# Patient Record
Sex: MALE | Race: WHITE | Employment: UNEMPLOYED | URBAN - METROPOLITAN AREA
[De-identification: names, ages, dates, MRNs, and addresses within clinical notes are randomized per-mention and may not be internally consistent; named-entity substitution may affect disease eponyms.]

---

## 2019-08-09 ENCOUNTER — OFFICE VISIT (OUTPATIENT)
Dept: ORTHOPEDIC SURGERY | Age: 59
End: 2019-08-09
Payer: COMMERCIAL

## 2019-08-09 VITALS — BODY MASS INDEX: 25.84 KG/M2 | HEIGHT: 73 IN | WEIGHT: 195 LBS

## 2019-08-09 DIAGNOSIS — M19.012 PRIMARY OSTEOARTHRITIS OF LEFT SHOULDER: Primary | ICD-10-CM

## 2019-08-09 DIAGNOSIS — M25.512 LEFT SHOULDER PAIN, UNSPECIFIED CHRONICITY: ICD-10-CM

## 2019-08-09 PROCEDURE — 99243 OFF/OP CNSLTJ NEW/EST LOW 30: CPT | Performed by: ORTHOPAEDIC SURGERY

## 2019-08-09 PROCEDURE — L3670 SO ACRO/CLAV CAN WEB PRE OTS: HCPCS | Performed by: ORTHOPAEDIC SURGERY

## 2019-08-09 NOTE — PROGRESS NOTES
malnutrition  Mental Status: The patient is oriented to time, place and person. The patient's mood and affect are appropriate. Vascular: Examination reveals no swelling or calf tenderness. Peripheral pulses are palpable and 2+. Neurological: The patient has good coordination. There is no weakness or sensory deficit. Shoulder Examination:    Inspection: On careful inspection he has no focal atrophy about the shoulder girdle. Palpation: He has both tenderness and crepitation along the anterior and posterior joint lines. Range of Motion: He has reasonably well-preserved arc of movement despite his terrible x-rays. His forward flexion is 140 degrees, abduction 120 degrees, external rotation 50 degrees. Internal rotation to L3. Strength: Is good isometric strength at least 4+/5 without focal lag signs are giving way    Special Tests: He has significant pain with any type of dynamic active compression test.  He has a very prominent acromioclavicular joint with mild tenderness. Skin: There are no rashes, ulcerations or lesions. Gait: Stable with no assist device    Spine full cervical rotation    Additional Comments:         Radiology:     Plain x-rays obtained and reviewed in the office today include 4 views of the left shoulder. These demonstrate very severe grade 4 concentric osteoarthritis. He has very mild early posterior glenoid erosion. Flattening of the humeral head with sclerosis. Subchondral cysts and moderately sized inferior humeral head osteophyte. Assessment : Severe left shoulder Javier humeral osteoarthritis      Office Procedures:  Orders Placed This Encounter   Procedures    XR SHOULDER LEFT (MIN 2 VIEWS)     Standing Status:   Future     Number of Occurrences:   1     Standing Expiration Date:   8/9/2020    CT Shoulder Left WO Contrast     Scheduling Instructions:      Please scan under [address] protocol.       [facility]

## 2019-08-12 ENCOUNTER — OFFICE VISIT (OUTPATIENT)
Dept: ORTHOPEDIC SURGERY | Age: 59
End: 2019-08-12
Payer: COMMERCIAL

## 2019-08-12 DIAGNOSIS — M25.522 LEFT ELBOW PAIN: ICD-10-CM

## 2019-08-12 DIAGNOSIS — M25.521 RIGHT ELBOW PAIN: Primary | ICD-10-CM

## 2019-08-12 DIAGNOSIS — M77.11 RIGHT LATERAL EPICONDYLITIS: ICD-10-CM

## 2019-08-12 DIAGNOSIS — M77.12 LEFT LATERAL EPICONDYLITIS: ICD-10-CM

## 2019-08-12 PROCEDURE — 99213 OFFICE O/P EST LOW 20 MIN: CPT | Performed by: ORTHOPAEDIC SURGERY

## 2019-08-12 PROCEDURE — 1036F TOBACCO NON-USER: CPT | Performed by: ORTHOPAEDIC SURGERY

## 2019-08-12 PROCEDURE — G8427 DOCREV CUR MEDS BY ELIG CLIN: HCPCS | Performed by: ORTHOPAEDIC SURGERY

## 2019-08-12 PROCEDURE — 3017F COLORECTAL CA SCREEN DOC REV: CPT | Performed by: ORTHOPAEDIC SURGERY

## 2019-08-12 PROCEDURE — G8419 CALC BMI OUT NRM PARAM NOF/U: HCPCS | Performed by: ORTHOPAEDIC SURGERY

## 2019-08-19 ENCOUNTER — HOSPITAL ENCOUNTER (OUTPATIENT)
Dept: OCCUPATIONAL THERAPY | Age: 59
Setting detail: THERAPIES SERIES
Discharge: HOME | End: 2019-08-19
Payer: COMMERCIAL

## 2019-08-19 PROCEDURE — 97110 THERAPEUTIC EXERCISES: CPT | Performed by: OCCUPATIONAL THERAPIST

## 2019-08-19 PROCEDURE — 97140 MANUAL THERAPY 1/> REGIONS: CPT | Performed by: OCCUPATIONAL THERAPIST

## 2019-08-19 PROCEDURE — 97165 OT EVAL LOW COMPLEX 30 MIN: CPT | Performed by: OCCUPATIONAL THERAPIST

## 2019-08-19 NOTE — PLAN OF CARE
Dressing    [] Self Feeding   [] Grooming    [] Work/Education   [] Functional Mobility  [] Sleeping/Rest   [] Toileting/Hygiene   [x] Recreational Activities   [] Driving    [] Community/Social Participation   [] Other:     Rehab Potential:   [] Excellent [x] Good [] Fair  [] Poor     Barriers affecting rehab potential:  []Age    []Lack of Motivation   []Co-Morbidities  []Cognitive Function  []Environmental/home/work barriers  []Other: Tolerance of evaluation/treatment:    [] Excellent [] Good [] Fair  [] Poor    PLAN OF CARE:  Interventions:   [x] Therapeutic Exercise [x] Therapeutic Activity    [x] Activities of Daily Living [x] Neuromuscular Re-education      [x] Patient Education  [x] Manual Therapy      [x] Modalities as needed, and not otherwise contraindicated, including: ultrasound,paraffin,moist heat/cold pack, electrical stimulation, contrast bath, iontophoresis  [] Splinting    Frequency/Duration:  1-2 days per week for 6 weeks      GOALS:  Short Term Goals: To be achieved in: 2 weeks  1. Independent in HEP and progression per patient tolerance, in order to prevent re-injury. 2. Patient will have a decrease in pain to facilitate improvement in movement, function, and ADLs as indicated by Functional Deficits. Long Term Goals to be achieved in 6  weeks, including patient directed goals to address identified performance deficits:  1) Pt to be independent in graded HEP progression with a good level of effort and compliance. 2) Pt to report a score of 20 % or less on the Quick DASH disability questionnaire for increased performance with carrying, moving, and handling objects. 3) Pt will demonstrate increased ROM to bilateral elbows pain free with no resistance for improved ability to perform facial hygiene without pain  4) Pt will demonstrate increased strength to left  to 70 lbs. With 2/10 pain for improved ability to lift household objects.   5) Pt will have a decrease in pain to 2/10 to facilitate improvement in ability to do normal recreational activities with less pain. 6)    7)      OCCUPATIONAL THERAPY EVALUATION COMPLEXITY JUSTIFICATION:    [x] An occupational profile and medical/therapy history, which includes:   [x] a brief history including medical and/or therapy records relating to the     presenting problem   [] an expanded review of medical and/or therapy records and additional review     of physical, cognitive or psychosocial history related to current functional    performance   [] an extensive additional review of review of medical and/or therapy records and physical, cognitive, or psychosocial history related to current    functional performance    [x] An assessment that identifies performance deficits (relating to physical, cognitive, or psychosocial skills) that result in activity limitations and/or participation restrictions:   [x] 1-3 performance deficits   [] 3-5 performance deficits   [] 5 or more performance deficits    [x] Clinical decision making of:   [x] low complexity, including analysis of occupational profile, data analysis from problem focused assessment, and consideration of a limited number of treatment options. No comorbidities affect occupational performance. No task modifications or assistance needed to complete evaluation. [] moderate complexity, including analysis of occupational profile, data analysis from detailed assessment and consideration of several treatment options. Comorbidities that affect occupational performance may be present. Minimal to moderate task modifications or assistance needed to complete assessment. [] high complexity, including analysis of occupational profile, analysis of data from comprehensive assessment and consideration of multiple treatment options. Multiple comorbidities present that affect occupational performance. Significant task modifications or assistance needed to complete assessment.     Evaluation Code:  [x] Low

## 2019-08-26 ENCOUNTER — HOSPITAL ENCOUNTER (OUTPATIENT)
Dept: OCCUPATIONAL THERAPY | Age: 59
Setting detail: THERAPIES SERIES
Discharge: HOME | End: 2019-08-26
Payer: COMMERCIAL

## 2019-08-26 PROCEDURE — 97110 THERAPEUTIC EXERCISES: CPT | Performed by: OCCUPATIONAL THERAPIST

## 2019-08-26 PROCEDURE — 97140 MANUAL THERAPY 1/> REGIONS: CPT | Performed by: OCCUPATIONAL THERAPIST

## 2019-08-26 NOTE — FLOWSHEET NOTE
left  to 70 lbs. With 2/10 pain for improved ability to lift household objects. Not met but improved, at 65#  5) Pt will have a decrease in pain to 2/10 to facilitate improvement in ability to do normal recreational activities with less pain. MET    Progression Towards Functional goals:  [x] Patient is progressing as expected towards functional goals listed. [] Progression is slowed due to complexities listed. [] Progression has been slowed due to co-morbidities. [] Plan just implemented, too soon to assess goals progression  [] All goals are met  [x] Other:  Met goals 3 and 5.  2 was not retested. 4 is improving    ASSESSMENT:  Patient feels he has a good understanding of program to prevent exacerbation of symptoms and allow healing to occur with time. No further follow-up desired. Treatment/Activity Tolerance:  [x] Patient tolerated treatment well [] Patient limited by fatique  [] Patient limited by pain  [] Patient limited by other medical complications  [] Other:     Prognosis: [x] Good [] Fair  [] Poor    Patient Requires Follow-up: [] Yes  [x] No  - per patient request    PLAN: See eval  [] Continue per plan of care [] Alter current plan (see comments)  [] Plan of care initiated [] Hold pending MD visit [x] Discharge    If patient does not return for further follow ups after this date, please consider this as the patient's discharge from occupational therapy.     Electronically signed by: Jose Rider, [address] [place] [phone]

## 2019-09-11 ENCOUNTER — OFFICE VISIT (OUTPATIENT)
Dept: FAMILY MEDICINE CLINIC | Age: 59
End: 2019-09-11
Payer: COMMERCIAL

## 2019-09-11 VITALS
OXYGEN SATURATION: 98 % | TEMPERATURE: 98 F | HEART RATE: 82 BPM | SYSTOLIC BLOOD PRESSURE: 131 MMHG | RESPIRATION RATE: 16 BRPM | DIASTOLIC BLOOD PRESSURE: 81 MMHG | BODY MASS INDEX: 26.33 KG/M2 | HEIGHT: 73 IN | WEIGHT: 198.7 LBS

## 2019-09-11 DIAGNOSIS — Z01.818 PREOP EXAMINATION: Primary | ICD-10-CM

## 2019-09-11 DIAGNOSIS — Z12.11 COLON CANCER SCREENING: ICD-10-CM

## 2019-09-11 DIAGNOSIS — E78.2 MIXED HYPERLIPIDEMIA: ICD-10-CM

## 2019-09-11 DIAGNOSIS — Z12.5 SCREENING PSA (PROSTATE SPECIFIC ANTIGEN): ICD-10-CM

## 2019-09-11 DIAGNOSIS — I10 ESSENTIAL HYPERTENSION: ICD-10-CM

## 2019-09-11 DIAGNOSIS — Z86.79 HISTORY OF ATRIAL FIBRILLATION: ICD-10-CM

## 2019-09-11 DIAGNOSIS — M25.512 ACUTE PAIN OF LEFT SHOULDER: ICD-10-CM

## 2019-09-11 DIAGNOSIS — C81.90 HODGKIN LYMPHOMA, UNSPECIFIED HODGKIN LYMPHOMA TYPE, UNSPECIFIED BODY REGION (HCC): ICD-10-CM

## 2019-09-11 DIAGNOSIS — F33.0 MILD EPISODE OF RECURRENT MAJOR DEPRESSIVE DISORDER (HCC): ICD-10-CM

## 2019-09-11 PROCEDURE — 99244 OFF/OP CNSLTJ NEW/EST MOD 40: CPT | Performed by: FAMILY MEDICINE

## 2019-09-11 PROCEDURE — G8427 DOCREV CUR MEDS BY ELIG CLIN: HCPCS | Performed by: FAMILY MEDICINE

## 2019-09-11 PROCEDURE — G8419 CALC BMI OUT NRM PARAM NOF/U: HCPCS | Performed by: FAMILY MEDICINE

## 2019-09-11 RX ORDER — BUPROPION HYDROCHLORIDE 300 MG/1
300 TABLET ORAL DAILY
Refills: 2 | COMMUNITY
Start: 2019-06-04 | End: 2019-11-01 | Stop reason: SDUPTHER

## 2019-09-11 RX ORDER — SERTRALINE HYDROCHLORIDE 100 MG/1
100 TABLET, FILM COATED ORAL DAILY
Refills: 2 | COMMUNITY
Start: 2019-06-11 | End: 2019-11-01 | Stop reason: SDUPTHER

## 2019-09-11 RX ORDER — TRAZODONE HYDROCHLORIDE 50 MG/1
50 TABLET ORAL NIGHTLY
Refills: 2 | COMMUNITY
Start: 2019-06-11 | End: 2020-02-24

## 2019-09-11 ASSESSMENT — PATIENT HEALTH QUESTIONNAIRE - PHQ9
SUM OF ALL RESPONSES TO PHQ QUESTIONS 1-9: 0
1. LITTLE INTEREST OR PLEASURE IN DOING THINGS: 0
SUM OF ALL RESPONSES TO PHQ9 QUESTIONS 1 & 2: 0
2. FEELING DOWN, DEPRESSED OR HOPELESS: 0
SUM OF ALL RESPONSES TO PHQ QUESTIONS 1-9: 0

## 2019-09-11 ASSESSMENT — ENCOUNTER SYMPTOMS
RECTAL PAIN: 0
SINUS PRESSURE: 0
COLOR CHANGE: 0
COUGH: 0
FACIAL SWELLING: 0
EYE REDNESS: 0
ANAL BLEEDING: 0
BLOOD IN STOOL: 0
EYE ITCHING: 0
SHORTNESS OF BREATH: 0
RHINORRHEA: 0
SORE THROAT: 0
EYE PAIN: 0
TROUBLE SWALLOWING: 0
BACK PAIN: 0
CONSTIPATION: 0
STRIDOR: 0
VOMITING: 0
CHEST TIGHTNESS: 0
APNEA: 0
ABDOMINAL PAIN: 0
SINUS PAIN: 0
NAUSEA: 0
CHOKING: 0
PHOTOPHOBIA: 0
DIARRHEA: 0
WHEEZING: 0
ABDOMINAL DISTENTION: 0
VOICE CHANGE: 0
EYE DISCHARGE: 0

## 2019-09-11 NOTE — PROGRESS NOTES
appearing. Ok to proceed with procedure if stable labs/EKG/echo/clearance by cards. Comprehensive Metabolic Panel    CBC    EKG 12 Lead    Jessica Ohara MD, Cardiology, [facility]   2. Pain of left shoulder  Per ortho. 3. Essential hypertension  Stable. EKG 12 Lead    ECHO Complete 2D W Doppler W Color   4. Mild episode of recurrent major depressive disorder (HCC)  Stable. Per psych. 5. Screening PSA (prostate specific antigen)  Risks vs benefits of PSA screening reviewed:pt' wishes to proceed w/testing. Psa screening   6. Mixed hyperlipidemia  Labs due. cmp/lipids            7. Colon cancer screening  Ambulatory referral to Gastroenterology   8. History of atrial fibrillation  Stable. Establish with cards. Check tests. Controlled rate with betablocker. EKG 12 Lead    ECHO Complete 2D W Doppler Raciel Shabazz MD, Cardiology, [facility]   9. Hodgkin's dz:stage 4  S/p tx:per oncology:pt' to schedule f/u appt. Plan:         Pre-op letter/office note has been sent(faxed) to specialist requesting preop consultation. No NSAIDs 1week prior to procedure. Advised release of medical records from all prior physicians(including PCP/specialists)  Obtain labs/diagnostic tests as discussed today & call back for results within 2-7days. Pt' left office in good condition. Advised to go to local ER or call 911 for any worrisome signs/sx.           Devaughn Durand MD

## 2019-09-16 ENCOUNTER — HOSPITAL ENCOUNTER (OUTPATIENT)
Age: 59
Discharge: HOME | End: 2019-09-16
Payer: COMMERCIAL

## 2019-09-16 LAB
ABO/RH: NORMAL
ALBUMIN SERPL-MCNC: 4.8 G/DL (ref 3.4–5)
ANION GAP SERPL CALCULATED.3IONS-SCNC: 14 MMOL/L (ref 3–16)
ANTIBODY SCREEN: NORMAL
APTT: 33 SEC (ref 26–36)
BACTERIA: ABNORMAL /HPF
BASOPHILS ABSOLUTE: 0 K/UL (ref 0–0.2)
BASOPHILS RELATIVE PERCENT: 0.6 %
BILIRUBIN URINE: NEGATIVE
BLOOD, URINE: ABNORMAL
BUN BLDV-MCNC: 13 MG/DL (ref 7–20)
CALCIUM SERPL-MCNC: 9.6 MG/DL (ref 8.3–10.6)
CASTS: ABNORMAL /LPF
CHLORIDE BLD-SCNC: 104 MMOL/L (ref 99–110)
CLARITY: CLEAR
CO2: 26 MMOL/L (ref 21–32)
COLOR: YELLOW
CREAT SERPL-MCNC: 0.8 MG/DL (ref 0.9–1.3)
EKG ATRIAL RATE: 58 BPM
EKG DIAGNOSIS: NORMAL
EKG P AXIS: -8 DEGREES
EKG P-R INTERVAL: 156 MS
EKG Q-T INTERVAL: 406 MS
EKG QRS DURATION: 88 MS
EKG QTC CALCULATION (BAZETT): 398 MS
EKG R AXIS: 0 DEGREES
EKG T AXIS: 5 DEGREES
EKG VENTRICULAR RATE: 58 BPM
EOSINOPHILS ABSOLUTE: 0.3 K/UL (ref 0–0.6)
EOSINOPHILS RELATIVE PERCENT: 6.4 %
GFR AFRICAN AMERICAN: >60
GFR NON-AFRICAN AMERICAN: >60
GLUCOSE BLD-MCNC: 94 MG/DL (ref 70–99)
GLUCOSE URINE: NEGATIVE MG/DL
HCT VFR BLD CALC: 38.2 % (ref 40.5–52.5)
HEMOGLOBIN: 12.9 G/DL (ref 13.5–17.5)
INR BLD: 0.98 (ref 0.86–1.14)
KETONES, URINE: NEGATIVE MG/DL
LEUKOCYTE ESTERASE, URINE: NEGATIVE
LYMPHOCYTES ABSOLUTE: 1.1 K/UL (ref 1–5.1)
LYMPHOCYTES RELATIVE PERCENT: 26.8 %
MCH RBC QN AUTO: 31.3 PG (ref 26–34)
MCHC RBC AUTO-ENTMCNC: 33.6 G/DL (ref 31–36)
MCV RBC AUTO: 93.1 FL (ref 80–100)
MICROSCOPIC EXAMINATION: YES
MONOCYTES ABSOLUTE: 0.5 K/UL (ref 0–1.3)
MONOCYTES RELATIVE PERCENT: 12.2 %
NEUTROPHILS ABSOLUTE: 2.2 K/UL (ref 1.7–7.7)
NEUTROPHILS RELATIVE PERCENT: 54 %
NITRITE, URINE: NEGATIVE
PDW BLD-RTO: 13.1 % (ref 12.4–15.4)
PH UA: 6 (ref 5–8)
PLATELET # BLD: 301 K/UL (ref 135–450)
PMV BLD AUTO: 7.6 FL (ref 5–10.5)
POTASSIUM SERPL-SCNC: 4.8 MMOL/L (ref 3.5–5.1)
PROTEIN UA: ABNORMAL MG/DL
PROTHROMBIN TIME: 11.2 SEC (ref 9.8–13)
RBC # BLD: 4.1 M/UL (ref 4.2–5.9)
RBC UA: ABNORMAL /HPF (ref 0–2)
SODIUM BLD-SCNC: 144 MMOL/L (ref 136–145)
SPECIFIC GRAVITY UA: >=1.03 (ref 1–1.03)
TRANSFERRIN: 245 MG/DL (ref 200–360)
URINE TYPE: ABNORMAL
UROBILINOGEN, URINE: 0.2 E.U./DL
WBC # BLD: 4.1 K/UL (ref 4–11)
WBC UA: ABNORMAL /HPF (ref 0–5)

## 2019-09-16 PROCEDURE — 93010 ELECTROCARDIOGRAM REPORT: CPT | Performed by: INTERNAL MEDICINE

## 2019-09-16 PROCEDURE — 85730 THROMBOPLASTIN TIME PARTIAL: CPT

## 2019-09-16 PROCEDURE — 82040 ASSAY OF SERUM ALBUMIN: CPT

## 2019-09-16 PROCEDURE — 81001 URINALYSIS AUTO W/SCOPE: CPT

## 2019-09-16 PROCEDURE — 86901 BLOOD TYPING SEROLOGIC RH(D): CPT

## 2019-09-16 PROCEDURE — 93005 ELECTROCARDIOGRAM TRACING: CPT | Performed by: ORTHOPAEDIC SURGERY

## 2019-09-16 PROCEDURE — 86850 RBC ANTIBODY SCREEN: CPT

## 2019-09-16 PROCEDURE — 85025 COMPLETE CBC W/AUTO DIFF WBC: CPT

## 2019-09-16 PROCEDURE — 36415 COLL VENOUS BLD VENIPUNCTURE: CPT

## 2019-09-16 PROCEDURE — 85610 PROTHROMBIN TIME: CPT

## 2019-09-16 PROCEDURE — 86900 BLOOD TYPING SEROLOGIC ABO: CPT

## 2019-09-16 PROCEDURE — 84466 ASSAY OF TRANSFERRIN: CPT

## 2019-09-16 PROCEDURE — 80048 BASIC METABOLIC PNL TOTAL CA: CPT

## 2019-09-16 PROCEDURE — 83036 HEMOGLOBIN GLYCOSYLATED A1C: CPT

## 2019-09-16 PROCEDURE — 87086 URINE CULTURE/COLONY COUNT: CPT

## 2019-09-17 ENCOUNTER — TELEPHONE (OUTPATIENT)
Dept: ORTHOPEDIC SURGERY | Age: 59
End: 2019-09-17

## 2019-09-17 LAB
ESTIMATED AVERAGE GLUCOSE: 102.5 MG/DL
HBA1C MFR BLD: 5.2 %
URINE CULTURE, ROUTINE: NORMAL

## 2019-09-17 NOTE — TELEPHONE ENCOUNTER
[place] Nurse [id number]    Date: 10/3/19  Type of SX:  Inpatient  Location: [facility]  CPT: 10520   SX area: LT shoulder

## 2019-09-18 ENCOUNTER — TELEPHONE (OUTPATIENT)
Dept: ORTHOPEDIC SURGERY | Age: 59
End: 2019-09-18

## 2019-09-27 ENCOUNTER — HOSPITAL ENCOUNTER (OUTPATIENT)
Dept: NON INVASIVE DIAGNOSTICS | Age: 59
Discharge: HOME | End: 2019-09-27
Payer: COMMERCIAL

## 2019-09-27 DIAGNOSIS — I10 ESSENTIAL HYPERTENSION: ICD-10-CM

## 2019-09-27 DIAGNOSIS — R93.1 DECREASED CARDIAC EJECTION FRACTION: ICD-10-CM

## 2019-09-27 DIAGNOSIS — R93.1 ABNORMAL ECHOCARDIOGRAM: Primary | ICD-10-CM

## 2019-09-27 DIAGNOSIS — Z86.79 HISTORY OF ATRIAL FIBRILLATION: ICD-10-CM

## 2019-09-27 LAB
LV EF: 43 %
LVEF MODALITY: NORMAL

## 2019-09-27 PROCEDURE — 93306 TTE W/DOPPLER COMPLETE: CPT

## 2019-09-28 DIAGNOSIS — E87.5 HYPERKALEMIA: Primary | ICD-10-CM

## 2019-09-30 ENCOUNTER — TELEPHONE (OUTPATIENT)
Dept: FAMILY MEDICINE CLINIC | Age: 59
End: 2019-09-30

## 2019-09-30 ENCOUNTER — HOSPITAL ENCOUNTER (OUTPATIENT)
Dept: CT IMAGING | Age: 59
Discharge: HOME | End: 2019-09-30
Payer: COMMERCIAL

## 2019-09-30 DIAGNOSIS — M19.012 OSTEOARTHRITIS OF LEFT SHOULDER, UNSPECIFIED OSTEOARTHRITIS TYPE: ICD-10-CM

## 2019-09-30 PROCEDURE — 73200 CT UPPER EXTREMITY W/O DYE: CPT

## 2019-10-01 ENCOUNTER — OFFICE VISIT (OUTPATIENT)
Dept: CARDIOLOGY CLINIC | Age: 59
End: 2019-10-01
Payer: COMMERCIAL

## 2019-10-01 ENCOUNTER — PRE-PROCEDURE TELEPHONE (OUTPATIENT)
Dept: CARDIAC CATH/INVASIVE PROCEDURES | Age: 59
End: 2019-10-01

## 2019-10-01 ENCOUNTER — PREP FOR PROCEDURE (OUTPATIENT)
Dept: CARDIOLOGY CLINIC | Age: 59
End: 2019-10-01

## 2019-10-01 VITALS
HEART RATE: 80 BPM | BODY MASS INDEX: 27.05 KG/M2 | DIASTOLIC BLOOD PRESSURE: 70 MMHG | WEIGHT: 205 LBS | SYSTOLIC BLOOD PRESSURE: 124 MMHG

## 2019-10-01 DIAGNOSIS — I42.0 DILATED CARDIOMYOPATHY (HCC): ICD-10-CM

## 2019-10-01 DIAGNOSIS — R93.1 ABNORMAL ECHOCARDIOGRAM: Primary | ICD-10-CM

## 2019-10-01 DIAGNOSIS — I48.0 PAROXYSMAL ATRIAL FIBRILLATION (HCC): ICD-10-CM

## 2019-10-01 DIAGNOSIS — E78.5 HYPERLIPIDEMIA, UNSPECIFIED HYPERLIPIDEMIA TYPE: ICD-10-CM

## 2019-10-01 PROCEDURE — G8419 CALC BMI OUT NRM PARAM NOF/U: HCPCS | Performed by: INTERNAL MEDICINE

## 2019-10-01 PROCEDURE — 1036F TOBACCO NON-USER: CPT | Performed by: INTERNAL MEDICINE

## 2019-10-01 PROCEDURE — 99204 OFFICE O/P NEW MOD 45 MIN: CPT | Performed by: INTERNAL MEDICINE

## 2019-10-01 PROCEDURE — 3017F COLORECTAL CA SCREEN DOC REV: CPT | Performed by: INTERNAL MEDICINE

## 2019-10-01 PROCEDURE — G8484 FLU IMMUNIZE NO ADMIN: HCPCS | Performed by: INTERNAL MEDICINE

## 2019-10-01 PROCEDURE — G8427 DOCREV CUR MEDS BY ELIG CLIN: HCPCS | Performed by: INTERNAL MEDICINE

## 2019-10-01 RX ORDER — ASPIRIN 325 MG
325 TABLET ORAL ONCE
Status: CANCELLED | OUTPATIENT
Start: 2019-10-02

## 2019-10-01 RX ORDER — SODIUM CHLORIDE 0.9 % (FLUSH) 0.9 %
10 SYRINGE (ML) INJECTION PRN
Status: CANCELLED | OUTPATIENT
Start: 2019-10-01

## 2019-10-01 RX ORDER — SODIUM CHLORIDE 9 MG/ML
INJECTION, SOLUTION INTRAVENOUS CONTINUOUS
Status: CANCELLED | OUTPATIENT
Start: 2019-10-01

## 2019-10-01 RX ORDER — SODIUM CHLORIDE 0.9 % (FLUSH) 0.9 %
10 SYRINGE (ML) INJECTION EVERY 12 HOURS SCHEDULED
Status: CANCELLED | OUTPATIENT
Start: 2019-10-01

## 2019-10-01 ASSESSMENT — ENCOUNTER SYMPTOMS
COUGH: 0
APNEA: 0
SHORTNESS OF BREATH: 0
CHEST TIGHTNESS: 0
ABDOMINAL DISTENTION: 0
CHOKING: 0

## 2019-10-02 ENCOUNTER — ANESTHESIA EVENT (OUTPATIENT)
Dept: OPERATING ROOM | Age: 59
DRG: 483 | End: 2019-10-02
Payer: COMMERCIAL

## 2019-10-02 ENCOUNTER — HOSPITAL ENCOUNTER (OUTPATIENT)
Dept: CARDIAC CATH/INVASIVE PROCEDURES | Age: 59
Discharge: HOME | End: 2019-10-02
Attending: INTERNAL MEDICINE | Admitting: INTERNAL MEDICINE
Payer: COMMERCIAL

## 2019-10-02 ENCOUNTER — TELEPHONE (OUTPATIENT)
Dept: FAMILY MEDICINE CLINIC | Age: 59
End: 2019-10-02

## 2019-10-02 VITALS — HEIGHT: 73 IN | TEMPERATURE: 98.7 F | BODY MASS INDEX: 27.17 KG/M2 | WEIGHT: 205 LBS

## 2019-10-02 DIAGNOSIS — I42.0 DILATED CARDIOMYOPATHY (HCC): Primary | ICD-10-CM

## 2019-10-02 PROBLEM — Z01.818 PRE-OP EVALUATION: Status: ACTIVE | Noted: 2019-10-02

## 2019-10-02 PROBLEM — I42.9 CARDIOMYOPATHY (HCC): Status: ACTIVE | Noted: 2019-10-02

## 2019-10-02 LAB
A/G RATIO: 2.4 (ref 1.1–2.2)
ALBUMIN SERPL-MCNC: 4.8 G/DL (ref 3.4–5)
ALP BLD-CCNC: 76 U/L (ref 40–129)
ALT SERPL-CCNC: 18 U/L (ref 10–40)
ANION GAP SERPL CALCULATED.3IONS-SCNC: 15 MMOL/L (ref 3–16)
AST SERPL-CCNC: 16 U/L (ref 15–37)
BILIRUB SERPL-MCNC: 0.4 MG/DL (ref 0–1)
BUN BLDV-MCNC: 14 MG/DL (ref 7–20)
CALCIUM SERPL-MCNC: 9.4 MG/DL (ref 8.3–10.6)
CHLORIDE BLD-SCNC: 101 MMOL/L (ref 99–110)
CO2: 24 MMOL/L (ref 21–32)
CREAT SERPL-MCNC: 0.9 MG/DL (ref 0.9–1.3)
EKG ATRIAL RATE: 52 BPM
EKG DIAGNOSIS: NORMAL
EKG P AXIS: 29 DEGREES
EKG P-R INTERVAL: 174 MS
EKG Q-T INTERVAL: 436 MS
EKG QRS DURATION: 88 MS
EKG QTC CALCULATION (BAZETT): 405 MS
EKG R AXIS: -29 DEGREES
EKG T AXIS: 45 DEGREES
EKG VENTRICULAR RATE: 52 BPM
GFR AFRICAN AMERICAN: >60
GFR NON-AFRICAN AMERICAN: >60
GLOBULIN: 2 G/DL
GLUCOSE BLD-MCNC: 110 MG/DL (ref 70–99)
INR BLD: 1.04 (ref 0.86–1.14)
LEFT VENTRICULAR EJECTION FRACTION MODE: NORMAL
LV EF: 40 %
POTASSIUM SERPL-SCNC: 4.1 MMOL/L (ref 3.5–5.1)
PROTHROMBIN TIME: 11.8 SEC (ref 9.8–13)
SODIUM BLD-SCNC: 140 MMOL/L (ref 136–145)
TOTAL PROTEIN: 6.8 G/DL (ref 6.4–8.2)

## 2019-10-02 PROCEDURE — 6360000002 HC RX W HCPCS

## 2019-10-02 PROCEDURE — 93458 L HRT ARTERY/VENTRICLE ANGIO: CPT | Performed by: INTERNAL MEDICINE

## 2019-10-02 PROCEDURE — 80053 COMPREHEN METABOLIC PANEL: CPT

## 2019-10-02 PROCEDURE — 93010 ELECTROCARDIOGRAM REPORT: CPT | Performed by: INTERNAL MEDICINE

## 2019-10-02 PROCEDURE — 93005 ELECTROCARDIOGRAM TRACING: CPT | Performed by: INTERNAL MEDICINE

## 2019-10-02 PROCEDURE — 2709999900 HC NON-CHARGEABLE SUPPLY

## 2019-10-02 PROCEDURE — 85610 PROTHROMBIN TIME: CPT

## 2019-10-02 PROCEDURE — C1760 CLOSURE DEV, VASC: HCPCS

## 2019-10-02 PROCEDURE — 6360000004 HC RX CONTRAST MEDICATION: Performed by: INTERNAL MEDICINE

## 2019-10-02 PROCEDURE — 93458 L HRT ARTERY/VENTRICLE ANGIO: CPT

## 2019-10-02 PROCEDURE — 99217 PR OBSERVATION CARE DISCHARGE MANAGEMENT: CPT | Performed by: NURSE PRACTITIONER

## 2019-10-02 PROCEDURE — C1769 GUIDE WIRE: HCPCS

## 2019-10-02 PROCEDURE — C1894 INTRO/SHEATH, NON-LASER: HCPCS

## 2019-10-02 PROCEDURE — 99152 MOD SED SAME PHYS/QHP 5/>YRS: CPT

## 2019-10-02 PROCEDURE — 2500000003 HC RX 250 WO HCPCS

## 2019-10-02 RX ORDER — ASPIRIN 325 MG
325 TABLET ORAL ONCE
Status: DISCONTINUED | OUTPATIENT
Start: 2019-10-02 | End: 2019-10-02 | Stop reason: HOSPADM

## 2019-10-02 RX ORDER — LISINOPRIL 2.5 MG/1
2.5 TABLET ORAL DAILY
Qty: 30 TABLET | Refills: 0 | Status: SHIPPED | OUTPATIENT
Start: 2019-10-02 | End: 2019-10-02 | Stop reason: SDUPTHER

## 2019-10-02 RX ORDER — SODIUM CHLORIDE 0.9 % (FLUSH) 0.9 %
10 SYRINGE (ML) INJECTION PRN
Status: DISCONTINUED | OUTPATIENT
Start: 2019-10-02 | End: 2019-10-02 | Stop reason: HOSPADM

## 2019-10-02 RX ORDER — SODIUM CHLORIDE 9 MG/ML
INJECTION, SOLUTION INTRAVENOUS CONTINUOUS
Status: DISCONTINUED | OUTPATIENT
Start: 2019-10-02 | End: 2019-10-02 | Stop reason: HOSPADM

## 2019-10-02 RX ORDER — ASPIRIN 81 MG/1
81 TABLET ORAL DAILY
Qty: 90 TABLET | Refills: 3 | Status: SHIPPED | OUTPATIENT
Start: 2019-10-02 | End: 2020-01-16 | Stop reason: SDUPTHER

## 2019-10-02 RX ORDER — ASPIRIN 81 MG/1
81 TABLET ORAL DAILY
Qty: 30 TABLET | Refills: 0 | Status: SHIPPED | OUTPATIENT
Start: 2019-10-02 | End: 2019-10-02 | Stop reason: SDUPTHER

## 2019-10-02 RX ORDER — ATORVASTATIN CALCIUM 40 MG/1
40 TABLET, FILM COATED ORAL DAILY
Qty: 30 TABLET | Refills: 3 | Status: SHIPPED | OUTPATIENT
Start: 2019-10-02 | End: 2019-10-02 | Stop reason: SDUPTHER

## 2019-10-02 RX ORDER — ATORVASTATIN CALCIUM 40 MG/1
40 TABLET, FILM COATED ORAL DAILY
Qty: 90 TABLET | Refills: 3 | Status: SHIPPED | OUTPATIENT
Start: 2019-10-02 | End: 2020-01-16 | Stop reason: SDUPTHER

## 2019-10-02 RX ORDER — LISINOPRIL 2.5 MG/1
2.5 TABLET ORAL DAILY
Qty: 90 TABLET | Refills: 3 | Status: SHIPPED | OUTPATIENT
Start: 2019-10-02 | End: 2019-10-09 | Stop reason: SDUPTHER

## 2019-10-02 RX ORDER — SODIUM CHLORIDE 0.9 % (FLUSH) 0.9 %
10 SYRINGE (ML) INJECTION EVERY 12 HOURS SCHEDULED
Status: DISCONTINUED | OUTPATIENT
Start: 2019-10-02 | End: 2019-10-02 | Stop reason: HOSPADM

## 2019-10-02 RX ORDER — ACETAMINOPHEN 325 MG/1
650 TABLET ORAL EVERY 4 HOURS PRN
Status: DISCONTINUED | OUTPATIENT
Start: 2019-10-02 | End: 2019-10-02 | Stop reason: HOSPADM

## 2019-10-02 RX ADMIN — IOVERSOL 150 ML: 741 INJECTION INTRA-ARTERIAL; INTRAVENOUS at 09:00

## 2019-10-03 ENCOUNTER — HOSPITAL ENCOUNTER (INPATIENT)
Age: 59
LOS: 1 days | Discharge: HOME | DRG: 483 | End: 2019-10-04
Attending: ORTHOPAEDIC SURGERY | Admitting: ORTHOPAEDIC SURGERY
Payer: COMMERCIAL

## 2019-10-03 ENCOUNTER — ANESTHESIA (OUTPATIENT)
Dept: OPERATING ROOM | Age: 59
DRG: 483 | End: 2019-10-03
Payer: COMMERCIAL

## 2019-10-03 ENCOUNTER — APPOINTMENT (OUTPATIENT)
Dept: GENERAL RADIOLOGY | Age: 59
DRG: 483 | End: 2019-10-03
Attending: ORTHOPAEDIC SURGERY
Payer: COMMERCIAL

## 2019-10-03 VITALS
SYSTOLIC BLOOD PRESSURE: 87 MMHG | OXYGEN SATURATION: 100 % | RESPIRATION RATE: 3 BRPM | DIASTOLIC BLOOD PRESSURE: 59 MMHG | TEMPERATURE: 96.5 F

## 2019-10-03 DIAGNOSIS — Z96.612 STATUS POST TOTAL SHOULDER REPLACEMENT, LEFT: Primary | ICD-10-CM

## 2019-10-03 PROBLEM — M19.019 SHOULDER ARTHRITIS: Status: ACTIVE | Noted: 2019-10-03

## 2019-10-03 LAB
ABO/RH: NORMAL
ANTIBODY SCREEN: NORMAL

## 2019-10-03 PROCEDURE — 2580000003 HC RX 258: Performed by: NURSE ANESTHETIST, CERTIFIED REGISTERED

## 2019-10-03 PROCEDURE — 86850 RBC ANTIBODY SCREEN: CPT

## 2019-10-03 PROCEDURE — 6360000002 HC RX W HCPCS: Performed by: ORTHOPAEDIC SURGERY

## 2019-10-03 PROCEDURE — 6370000000 HC RX 637 (ALT 250 FOR IP): Performed by: ORTHOPAEDIC SURGERY

## 2019-10-03 PROCEDURE — C1713 ANCHOR/SCREW BN/BN,TIS/BN: HCPCS | Performed by: ORTHOPAEDIC SURGERY

## 2019-10-03 PROCEDURE — 2500000003 HC RX 250 WO HCPCS: Performed by: NURSE ANESTHETIST, CERTIFIED REGISTERED

## 2019-10-03 PROCEDURE — 64415 NJX AA&/STRD BRCH PLXS IMG: CPT | Performed by: ANESTHESIOLOGY

## 2019-10-03 PROCEDURE — 2500000003 HC RX 250 WO HCPCS: Performed by: ORTHOPAEDIC SURGERY

## 2019-10-03 PROCEDURE — 6360000002 HC RX W HCPCS: Performed by: NURSE ANESTHETIST, CERTIFIED REGISTERED

## 2019-10-03 PROCEDURE — C1776 JOINT DEVICE (IMPLANTABLE): HCPCS | Performed by: ORTHOPAEDIC SURGERY

## 2019-10-03 PROCEDURE — 7100000001 HC PACU RECOVERY - ADDTL 15 MIN: Performed by: ORTHOPAEDIC SURGERY

## 2019-10-03 PROCEDURE — 3700000000 HC ANESTHESIA ATTENDED CARE: Performed by: ORTHOPAEDIC SURGERY

## 2019-10-03 PROCEDURE — 86901 BLOOD TYPING SEROLOGIC RH(D): CPT

## 2019-10-03 PROCEDURE — APPSS15 APP SPLIT SHARED TIME 0-15 MINUTES: Performed by: PHYSICIAN ASSISTANT

## 2019-10-03 PROCEDURE — 6370000000 HC RX 637 (ALT 250 FOR IP): Performed by: ANESTHESIOLOGY

## 2019-10-03 PROCEDURE — 73020 X-RAY EXAM OF SHOULDER: CPT

## 2019-10-03 PROCEDURE — 2709999900 HC NON-CHARGEABLE SUPPLY: Performed by: ORTHOPAEDIC SURGERY

## 2019-10-03 PROCEDURE — 86900 BLOOD TYPING SEROLOGIC ABO: CPT

## 2019-10-03 PROCEDURE — 2580000003 HC RX 258: Performed by: ORTHOPAEDIC SURGERY

## 2019-10-03 PROCEDURE — 2700000000 HC OXYGEN THERAPY PER DAY

## 2019-10-03 PROCEDURE — 3600000014 HC SURGERY LEVEL 4 ADDTL 15MIN: Performed by: ORTHOPAEDIC SURGERY

## 2019-10-03 PROCEDURE — 1200000000 HC SEMI PRIVATE

## 2019-10-03 PROCEDURE — 7100000000 HC PACU RECOVERY - FIRST 15 MIN: Performed by: ORTHOPAEDIC SURGERY

## 2019-10-03 PROCEDURE — 2720000010 HC SURG SUPPLY STERILE: Performed by: ORTHOPAEDIC SURGERY

## 2019-10-03 PROCEDURE — 3700000001 HC ADD 15 MINUTES (ANESTHESIA): Performed by: ORTHOPAEDIC SURGERY

## 2019-10-03 PROCEDURE — 94761 N-INVAS EAR/PLS OXIMETRY MLT: CPT

## 2019-10-03 PROCEDURE — 3600000004 HC SURGERY LEVEL 4 BASE: Performed by: ORTHOPAEDIC SURGERY

## 2019-10-03 PROCEDURE — 2580000003 HC RX 258: Performed by: ANESTHESIOLOGY

## 2019-10-03 PROCEDURE — 0RRK0JZ REPLACEMENT OF LEFT SHOULDER JOINT WITH SYNTHETIC SUBSTITUTE, OPEN APPROACH: ICD-10-PCS | Performed by: ORTHOPAEDIC SURGERY

## 2019-10-03 DEVICE — CEMENT BNE RADIOPAQUE SIMPLEX P SPEEDDET: Type: IMPLANTABLE DEVICE | Site: SHOULDER | Status: FUNCTIONAL

## 2019-10-03 RX ORDER — ONDANSETRON 2 MG/ML
INJECTION INTRAMUSCULAR; INTRAVENOUS PRN
Status: DISCONTINUED | OUTPATIENT
Start: 2019-10-03 | End: 2019-10-03 | Stop reason: SDUPTHER

## 2019-10-03 RX ORDER — ASPIRIN 81 MG/1
81 TABLET ORAL DAILY
Status: DISCONTINUED | OUTPATIENT
Start: 2019-10-04 | End: 2019-10-04 | Stop reason: HOSPADM

## 2019-10-03 RX ORDER — GABAPENTIN 300 MG/1
600 CAPSULE ORAL 3 TIMES DAILY
Status: DISCONTINUED | OUTPATIENT
Start: 2019-10-03 | End: 2019-10-04 | Stop reason: HOSPADM

## 2019-10-03 RX ORDER — BUPROPION HYDROCHLORIDE 150 MG/1
300 TABLET ORAL DAILY
Status: DISCONTINUED | OUTPATIENT
Start: 2019-10-04 | End: 2019-10-04 | Stop reason: HOSPADM

## 2019-10-03 RX ORDER — DEXTROSE, SODIUM CHLORIDE, AND POTASSIUM CHLORIDE 5; .45; .15 G/100ML; G/100ML; G/100ML
INJECTION INTRAVENOUS CONTINUOUS
Status: DISCONTINUED | OUTPATIENT
Start: 2019-10-03 | End: 2019-10-04 | Stop reason: HOSPADM

## 2019-10-03 RX ORDER — METOPROLOL SUCCINATE 25 MG/1
25 TABLET, EXTENDED RELEASE ORAL DAILY
Status: DISCONTINUED | OUTPATIENT
Start: 2019-10-04 | End: 2019-10-04 | Stop reason: HOSPADM

## 2019-10-03 RX ORDER — ROCURONIUM BROMIDE 10 MG/ML
INJECTION, SOLUTION INTRAVENOUS PRN
Status: DISCONTINUED | OUTPATIENT
Start: 2019-10-03 | End: 2019-10-03 | Stop reason: SDUPTHER

## 2019-10-03 RX ORDER — SODIUM CHLORIDE, SODIUM LACTATE, POTASSIUM CHLORIDE, CALCIUM CHLORIDE 600; 310; 30; 20 MG/100ML; MG/100ML; MG/100ML; MG/100ML
INJECTION, SOLUTION INTRAVENOUS CONTINUOUS PRN
Status: DISCONTINUED | OUTPATIENT
Start: 2019-10-03 | End: 2019-10-03 | Stop reason: SDUPTHER

## 2019-10-03 RX ORDER — OXYCODONE HYDROCHLORIDE AND ACETAMINOPHEN 5; 325 MG/1; MG/1
1 TABLET ORAL EVERY 4 HOURS PRN
Status: DISCONTINUED | OUTPATIENT
Start: 2019-10-03 | End: 2019-10-04 | Stop reason: HOSPADM

## 2019-10-03 RX ORDER — PROPOFOL 10 MG/ML
INJECTION, EMULSION INTRAVENOUS PRN
Status: DISCONTINUED | OUTPATIENT
Start: 2019-10-03 | End: 2019-10-03 | Stop reason: SDUPTHER

## 2019-10-03 RX ORDER — CELECOXIB 100 MG/1
100 CAPSULE ORAL 2 TIMES DAILY
Status: DISCONTINUED | OUTPATIENT
Start: 2019-10-03 | End: 2019-10-04 | Stop reason: HOSPADM

## 2019-10-03 RX ORDER — ACETAMINOPHEN 500 MG
1000 TABLET ORAL ONCE
Status: DISCONTINUED | OUTPATIENT
Start: 2019-10-03 | End: 2019-10-03 | Stop reason: HOSPADM

## 2019-10-03 RX ORDER — OXYCODONE HYDROCHLORIDE AND ACETAMINOPHEN 5; 325 MG/1; MG/1
1 TABLET ORAL PRN
Status: DISCONTINUED | OUTPATIENT
Start: 2019-10-03 | End: 2019-10-03 | Stop reason: HOSPADM

## 2019-10-03 RX ORDER — ACETAMINOPHEN 10 MG/ML
1000 INJECTION, SOLUTION INTRAVENOUS ONCE
Status: COMPLETED | OUTPATIENT
Start: 2019-10-03 | End: 2019-10-03

## 2019-10-03 RX ORDER — FAMOTIDINE 20 MG/1
20 TABLET, FILM COATED ORAL 2 TIMES DAILY
Status: DISCONTINUED | OUTPATIENT
Start: 2019-10-03 | End: 2019-10-04 | Stop reason: HOSPADM

## 2019-10-03 RX ORDER — LIDOCAINE HYDROCHLORIDE 20 MG/ML
INJECTION, SOLUTION INFILTRATION; PERINEURAL PRN
Status: DISCONTINUED | OUTPATIENT
Start: 2019-10-03 | End: 2019-10-03 | Stop reason: SDUPTHER

## 2019-10-03 RX ORDER — LIDOCAINE HYDROCHLORIDE 10 MG/ML
0.3 INJECTION, SOLUTION EPIDURAL; INFILTRATION; INTRACAUDAL; PERINEURAL
Status: DISCONTINUED | OUTPATIENT
Start: 2019-10-03 | End: 2019-10-03 | Stop reason: HOSPADM

## 2019-10-03 RX ORDER — METOPROLOL SUCCINATE 25 MG/1
25 TABLET, EXTENDED RELEASE ORAL ONCE
Status: COMPLETED | OUTPATIENT
Start: 2019-10-03 | End: 2019-10-03

## 2019-10-03 RX ORDER — ATORVASTATIN CALCIUM 40 MG/1
40 TABLET, FILM COATED ORAL DAILY
Status: DISCONTINUED | OUTPATIENT
Start: 2019-10-04 | End: 2019-10-04 | Stop reason: HOSPADM

## 2019-10-03 RX ORDER — GABAPENTIN 300 MG/1
600 CAPSULE ORAL ONCE
Status: COMPLETED | OUTPATIENT
Start: 2019-10-03 | End: 2019-10-03

## 2019-10-03 RX ORDER — SODIUM CHLORIDE 0.9 % (FLUSH) 0.9 %
10 SYRINGE (ML) INJECTION EVERY 12 HOURS SCHEDULED
Status: DISCONTINUED | OUTPATIENT
Start: 2019-10-03 | End: 2019-10-03 | Stop reason: HOSPADM

## 2019-10-03 RX ORDER — OXYCODONE HYDROCHLORIDE AND ACETAMINOPHEN 5; 325 MG/1; MG/1
2 TABLET ORAL PRN
Status: DISCONTINUED | OUTPATIENT
Start: 2019-10-03 | End: 2019-10-03 | Stop reason: HOSPADM

## 2019-10-03 RX ORDER — DOCUSATE SODIUM 100 MG/1
100 CAPSULE, LIQUID FILLED ORAL 2 TIMES DAILY
Status: DISCONTINUED | OUTPATIENT
Start: 2019-10-03 | End: 2019-10-04 | Stop reason: HOSPADM

## 2019-10-03 RX ORDER — SODIUM CHLORIDE, SODIUM LACTATE, POTASSIUM CHLORIDE, CALCIUM CHLORIDE 600; 310; 30; 20 MG/100ML; MG/100ML; MG/100ML; MG/100ML
INJECTION, SOLUTION INTRAVENOUS CONTINUOUS
Status: DISCONTINUED | OUTPATIENT
Start: 2019-10-03 | End: 2019-10-03

## 2019-10-03 RX ORDER — FENTANYL CITRATE 50 UG/ML
INJECTION, SOLUTION INTRAMUSCULAR; INTRAVENOUS PRN
Status: DISCONTINUED | OUTPATIENT
Start: 2019-10-03 | End: 2019-10-03 | Stop reason: SDUPTHER

## 2019-10-03 RX ORDER — LABETALOL HYDROCHLORIDE 5 MG/ML
5 INJECTION, SOLUTION INTRAVENOUS EVERY 10 MIN PRN
Status: DISCONTINUED | OUTPATIENT
Start: 2019-10-03 | End: 2019-10-03 | Stop reason: HOSPADM

## 2019-10-03 RX ORDER — ONDANSETRON 2 MG/ML
4 INJECTION INTRAMUSCULAR; INTRAVENOUS EVERY 10 MIN PRN
Status: DISCONTINUED | OUTPATIENT
Start: 2019-10-03 | End: 2019-10-03 | Stop reason: HOSPADM

## 2019-10-03 RX ORDER — DEXAMETHASONE SODIUM PHOSPHATE 4 MG/ML
INJECTION, SOLUTION INTRA-ARTICULAR; INTRALESIONAL; INTRAMUSCULAR; INTRAVENOUS; SOFT TISSUE PRN
Status: DISCONTINUED | OUTPATIENT
Start: 2019-10-03 | End: 2019-10-03 | Stop reason: SDUPTHER

## 2019-10-03 RX ORDER — GABAPENTIN 100 MG/1
100 CAPSULE ORAL ONCE
Status: DISCONTINUED | OUTPATIENT
Start: 2019-10-03 | End: 2019-10-04 | Stop reason: HOSPADM

## 2019-10-03 RX ORDER — HYDRALAZINE HYDROCHLORIDE 20 MG/ML
5 INJECTION INTRAMUSCULAR; INTRAVENOUS EVERY 10 MIN PRN
Status: DISCONTINUED | OUTPATIENT
Start: 2019-10-03 | End: 2019-10-03 | Stop reason: HOSPADM

## 2019-10-03 RX ORDER — MEPERIDINE HYDROCHLORIDE 50 MG/ML
12.5 INJECTION INTRAMUSCULAR; INTRAVENOUS; SUBCUTANEOUS EVERY 5 MIN PRN
Status: DISCONTINUED | OUTPATIENT
Start: 2019-10-03 | End: 2019-10-03 | Stop reason: HOSPADM

## 2019-10-03 RX ORDER — MORPHINE SULFATE 2 MG/ML
2 INJECTION, SOLUTION INTRAMUSCULAR; INTRAVENOUS
Status: DISCONTINUED | OUTPATIENT
Start: 2019-10-03 | End: 2019-10-04 | Stop reason: HOSPADM

## 2019-10-03 RX ORDER — SODIUM CHLORIDE 0.9 % (FLUSH) 0.9 %
10 SYRINGE (ML) INJECTION EVERY 12 HOURS SCHEDULED
Status: DISCONTINUED | OUTPATIENT
Start: 2019-10-03 | End: 2019-10-04 | Stop reason: HOSPADM

## 2019-10-03 RX ORDER — LISINOPRIL 2.5 MG/1
2.5 TABLET ORAL DAILY
Status: DISCONTINUED | OUTPATIENT
Start: 2019-10-04 | End: 2019-10-04 | Stop reason: HOSPADM

## 2019-10-03 RX ORDER — SODIUM CHLORIDE 0.9 % (FLUSH) 0.9 %
10 SYRINGE (ML) INJECTION PRN
Status: DISCONTINUED | OUTPATIENT
Start: 2019-10-03 | End: 2019-10-03 | Stop reason: HOSPADM

## 2019-10-03 RX ORDER — EPHEDRINE SULFATE 50 MG/ML
INJECTION, SOLUTION INTRAVENOUS PRN
Status: DISCONTINUED | OUTPATIENT
Start: 2019-10-03 | End: 2019-10-03 | Stop reason: SDUPTHER

## 2019-10-03 RX ORDER — OXYCODONE HYDROCHLORIDE AND ACETAMINOPHEN 5; 325 MG/1; MG/1
2 TABLET ORAL EVERY 4 HOURS PRN
Status: DISCONTINUED | OUTPATIENT
Start: 2019-10-03 | End: 2019-10-04 | Stop reason: HOSPADM

## 2019-10-03 RX ORDER — SODIUM CHLORIDE 0.9 % (FLUSH) 0.9 %
10 SYRINGE (ML) INJECTION PRN
Status: DISCONTINUED | OUTPATIENT
Start: 2019-10-03 | End: 2019-10-04 | Stop reason: HOSPADM

## 2019-10-03 RX ORDER — ONDANSETRON 2 MG/ML
4 INJECTION INTRAMUSCULAR; INTRAVENOUS EVERY 6 HOURS PRN
Status: DISCONTINUED | OUTPATIENT
Start: 2019-10-03 | End: 2019-10-04 | Stop reason: HOSPADM

## 2019-10-03 RX ORDER — TRAZODONE HYDROCHLORIDE 50 MG/1
50 TABLET ORAL NIGHTLY
Status: DISCONTINUED | OUTPATIENT
Start: 2019-10-03 | End: 2019-10-04 | Stop reason: HOSPADM

## 2019-10-03 RX ORDER — CELECOXIB 100 MG/1
200 CAPSULE ORAL ONCE
Status: COMPLETED | OUTPATIENT
Start: 2019-10-03 | End: 2019-10-03

## 2019-10-03 RX ADMIN — LIDOCAINE HYDROCHLORIDE 20 MG: 20 INJECTION, SOLUTION INFILTRATION; PERINEURAL at 10:49

## 2019-10-03 RX ADMIN — Medication 10 ML: at 21:19

## 2019-10-03 RX ADMIN — FENTANYL CITRATE 100 MCG: 50 INJECTION INTRAMUSCULAR; INTRAVENOUS at 10:49

## 2019-10-03 RX ADMIN — ROCURONIUM BROMIDE 50 MG: 10 SOLUTION INTRAVENOUS at 10:49

## 2019-10-03 RX ADMIN — SUGAMMADEX 200 MG: 100 INJECTION, SOLUTION INTRAVENOUS at 12:23

## 2019-10-03 RX ADMIN — EPHEDRINE SULFATE 5 MG: 50 INJECTION INTRAMUSCULAR; INTRAVENOUS; SUBCUTANEOUS at 11:26

## 2019-10-03 RX ADMIN — EPHEDRINE SULFATE 5 MG: 50 INJECTION INTRAMUSCULAR; INTRAVENOUS; SUBCUTANEOUS at 11:14

## 2019-10-03 RX ADMIN — EPHEDRINE SULFATE 5 MG: 50 INJECTION INTRAMUSCULAR; INTRAVENOUS; SUBCUTANEOUS at 11:41

## 2019-10-03 RX ADMIN — DOCUSATE SODIUM 100 MG: 100 CAPSULE, LIQUID FILLED ORAL at 21:17

## 2019-10-03 RX ADMIN — ONDANSETRON 4 MG: 2 INJECTION INTRAMUSCULAR; INTRAVENOUS at 10:49

## 2019-10-03 RX ADMIN — TRAZODONE HYDROCHLORIDE 50 MG: 50 TABLET ORAL at 21:17

## 2019-10-03 RX ADMIN — FAMOTIDINE 20 MG: 20 TABLET ORAL at 21:17

## 2019-10-03 RX ADMIN — CELECOXIB 200 MG: 100 CAPSULE ORAL at 09:59

## 2019-10-03 RX ADMIN — EPHEDRINE SULFATE 5 MG: 50 INJECTION INTRAMUSCULAR; INTRAVENOUS; SUBCUTANEOUS at 11:48

## 2019-10-03 RX ADMIN — GABAPENTIN 600 MG: 300 CAPSULE ORAL at 14:40

## 2019-10-03 RX ADMIN — FAMOTIDINE 20 MG: 20 TABLET ORAL at 14:39

## 2019-10-03 RX ADMIN — SODIUM CHLORIDE, POTASSIUM CHLORIDE, SODIUM LACTATE AND CALCIUM CHLORIDE: 600; 310; 30; 20 INJECTION, SOLUTION INTRAVENOUS at 10:00

## 2019-10-03 RX ADMIN — DEXAMETHASONE SODIUM PHOSPHATE 8 MG: 4 INJECTION, SOLUTION INTRAMUSCULAR; INTRAVENOUS at 10:49

## 2019-10-03 RX ADMIN — SODIUM CHLORIDE, POTASSIUM CHLORIDE, SODIUM LACTATE AND CALCIUM CHLORIDE: 600; 310; 30; 20 INJECTION, SOLUTION INTRAVENOUS at 11:48

## 2019-10-03 RX ADMIN — PROPOFOL 150 MG: 10 INJECTION, EMULSION INTRAVENOUS at 10:50

## 2019-10-03 RX ADMIN — Medication 2 G: at 10:49

## 2019-10-03 RX ADMIN — GABAPENTIN 600 MG: 300 CAPSULE ORAL at 09:59

## 2019-10-03 RX ADMIN — Medication 2 G: at 18:09

## 2019-10-03 RX ADMIN — ACETAMINOPHEN 1000 MG: 10 INJECTION, SOLUTION INTRAVENOUS at 10:00

## 2019-10-03 RX ADMIN — POTASSIUM CHLORIDE, DEXTROSE MONOHYDRATE AND SODIUM CHLORIDE: 150; 5; 450 INJECTION, SOLUTION INTRAVENOUS at 14:39

## 2019-10-03 RX ADMIN — CELECOXIB 100 MG: 100 CAPSULE ORAL at 14:40

## 2019-10-03 RX ADMIN — DOCUSATE SODIUM 100 MG: 100 CAPSULE, LIQUID FILLED ORAL at 14:40

## 2019-10-03 RX ADMIN — EPHEDRINE SULFATE 5 MG: 50 INJECTION INTRAMUSCULAR; INTRAVENOUS; SUBCUTANEOUS at 11:20

## 2019-10-03 RX ADMIN — METOPROLOL SUCCINATE 25 MG: 25 TABLET, EXTENDED RELEASE ORAL at 10:01

## 2019-10-03 RX ADMIN — CELECOXIB 100 MG: 100 CAPSULE ORAL at 21:17

## 2019-10-03 RX ADMIN — SODIUM CHLORIDE, POTASSIUM CHLORIDE, SODIUM LACTATE AND CALCIUM CHLORIDE: 600; 310; 30; 20 INJECTION, SOLUTION INTRAVENOUS at 10:29

## 2019-10-03 RX ADMIN — GABAPENTIN 600 MG: 300 CAPSULE ORAL at 21:17

## 2019-10-03 ASSESSMENT — PULMONARY FUNCTION TESTS
PIF_VALUE: 21
PIF_VALUE: 19
PIF_VALUE: 21
PIF_VALUE: 16
PIF_VALUE: 22
PIF_VALUE: 17
PIF_VALUE: 21
PIF_VALUE: 21
PIF_VALUE: 22
PIF_VALUE: 22
PIF_VALUE: 18
PIF_VALUE: 17
PIF_VALUE: 15
PIF_VALUE: 14
PIF_VALUE: 17
PIF_VALUE: 21
PIF_VALUE: 5
PIF_VALUE: 18
PIF_VALUE: 19
PIF_VALUE: 21
PIF_VALUE: 19
PIF_VALUE: 16
PIF_VALUE: 21
PIF_VALUE: 19
PIF_VALUE: 21
PIF_VALUE: 20
PIF_VALUE: 21
PIF_VALUE: 19
PIF_VALUE: 20
PIF_VALUE: 19
PIF_VALUE: 2
PIF_VALUE: 21
PIF_VALUE: 19
PIF_VALUE: 2
PIF_VALUE: 19
PIF_VALUE: 21
PIF_VALUE: 21
PIF_VALUE: 17
PIF_VALUE: 21
PIF_VALUE: 1
PIF_VALUE: 20
PIF_VALUE: 19
PIF_VALUE: 19
PIF_VALUE: 1
PIF_VALUE: 21
PIF_VALUE: 21
PIF_VALUE: 19
PIF_VALUE: 21
PIF_VALUE: 6
PIF_VALUE: 19
PIF_VALUE: 21
PIF_VALUE: 21
PIF_VALUE: 19
PIF_VALUE: 20
PIF_VALUE: 27
PIF_VALUE: 19
PIF_VALUE: 19
PIF_VALUE: 21
PIF_VALUE: 19
PIF_VALUE: 21
PIF_VALUE: 19
PIF_VALUE: 21
PIF_VALUE: 24
PIF_VALUE: 20
PIF_VALUE: 19
PIF_VALUE: 1
PIF_VALUE: 21
PIF_VALUE: 4
PIF_VALUE: 1
PIF_VALUE: 21
PIF_VALUE: 21
PIF_VALUE: 20
PIF_VALUE: 2
PIF_VALUE: 20
PIF_VALUE: 19
PIF_VALUE: 21
PIF_VALUE: 14
PIF_VALUE: 21
PIF_VALUE: 17
PIF_VALUE: 21
PIF_VALUE: 20
PIF_VALUE: 22
PIF_VALUE: 2
PIF_VALUE: 22
PIF_VALUE: 4
PIF_VALUE: 19
PIF_VALUE: 21
PIF_VALUE: 21
PIF_VALUE: 19
PIF_VALUE: 21
PIF_VALUE: 17
PIF_VALUE: 21
PIF_VALUE: 20
PIF_VALUE: 19
PIF_VALUE: 18
PIF_VALUE: 20
PIF_VALUE: 22
PIF_VALUE: 19
PIF_VALUE: 21
PIF_VALUE: 12
PIF_VALUE: 16
PIF_VALUE: 22
PIF_VALUE: 19
PIF_VALUE: 20
PIF_VALUE: 19

## 2019-10-03 ASSESSMENT — PAIN SCALES - GENERAL
PAINLEVEL_OUTOF10: 0

## 2019-10-03 ASSESSMENT — PAIN SCALES - WONG BAKER: WONGBAKER_NUMERICALRESPONSE: 0

## 2019-10-03 ASSESSMENT — PAIN - FUNCTIONAL ASSESSMENT: PAIN_FUNCTIONAL_ASSESSMENT: 0-10

## 2019-10-04 VITALS
HEART RATE: 66 BPM | BODY MASS INDEX: 28.7 KG/M2 | OXYGEN SATURATION: 99 % | TEMPERATURE: 97.5 F | SYSTOLIC BLOOD PRESSURE: 120 MMHG | RESPIRATION RATE: 14 BRPM | DIASTOLIC BLOOD PRESSURE: 77 MMHG | HEIGHT: 71 IN | WEIGHT: 205 LBS

## 2019-10-04 PROCEDURE — 97110 THERAPEUTIC EXERCISES: CPT

## 2019-10-04 PROCEDURE — 97161 PT EVAL LOW COMPLEX 20 MIN: CPT

## 2019-10-04 PROCEDURE — 6360000002 HC RX W HCPCS: Performed by: ORTHOPAEDIC SURGERY

## 2019-10-04 PROCEDURE — APPSS45 APP SPLIT SHARED TIME 31-45 MINUTES: Performed by: PHYSICIAN ASSISTANT

## 2019-10-04 PROCEDURE — 6370000000 HC RX 637 (ALT 250 FOR IP): Performed by: PHYSICIAN ASSISTANT

## 2019-10-04 PROCEDURE — 97530 THERAPEUTIC ACTIVITIES: CPT

## 2019-10-04 PROCEDURE — 6370000000 HC RX 637 (ALT 250 FOR IP): Performed by: ORTHOPAEDIC SURGERY

## 2019-10-04 PROCEDURE — 97166 OT EVAL MOD COMPLEX 45 MIN: CPT

## 2019-10-04 PROCEDURE — 2500000003 HC RX 250 WO HCPCS: Performed by: ORTHOPAEDIC SURGERY

## 2019-10-04 RX ORDER — OXYCODONE HYDROCHLORIDE AND ACETAMINOPHEN 5; 325 MG/1; MG/1
1 TABLET ORAL EVERY 6 HOURS PRN
Qty: 28 TABLET | Refills: 0 | Status: SHIPPED | OUTPATIENT
Start: 2019-10-04 | End: 2019-10-11

## 2019-10-04 RX ADMIN — GABAPENTIN 600 MG: 300 CAPSULE ORAL at 09:24

## 2019-10-04 RX ADMIN — BUPROPION HYDROCHLORIDE 300 MG: 150 TABLET, FILM COATED, EXTENDED RELEASE ORAL at 09:24

## 2019-10-04 RX ADMIN — POTASSIUM CHLORIDE, DEXTROSE MONOHYDRATE AND SODIUM CHLORIDE: 150; 5; 450 INJECTION, SOLUTION INTRAVENOUS at 01:24

## 2019-10-04 RX ADMIN — METOPROLOL SUCCINATE 25 MG: 25 TABLET, EXTENDED RELEASE ORAL at 09:22

## 2019-10-04 RX ADMIN — Medication 2 G: at 03:03

## 2019-10-04 RX ADMIN — OXYCODONE HYDROCHLORIDE AND ACETAMINOPHEN 1 TABLET: 5; 325 TABLET ORAL at 09:30

## 2019-10-04 RX ADMIN — FAMOTIDINE 20 MG: 20 TABLET ORAL at 09:23

## 2019-10-04 RX ADMIN — SERTRALINE HYDROCHLORIDE 100 MG: 50 TABLET ORAL at 09:23

## 2019-10-04 RX ADMIN — ATORVASTATIN CALCIUM 40 MG: 40 TABLET, FILM COATED ORAL at 09:23

## 2019-10-04 RX ADMIN — LISINOPRIL 2.5 MG: 2.5 TABLET ORAL at 09:23

## 2019-10-04 RX ADMIN — ASPIRIN 81 MG: 81 TABLET ORAL at 09:21

## 2019-10-04 RX ADMIN — OXYCODONE HYDROCHLORIDE AND ACETAMINOPHEN 1 TABLET: 5; 325 TABLET ORAL at 13:34

## 2019-10-04 RX ADMIN — CELECOXIB 100 MG: 100 CAPSULE ORAL at 09:22

## 2019-10-04 RX ADMIN — DOCUSATE SODIUM 100 MG: 100 CAPSULE, LIQUID FILLED ORAL at 09:19

## 2019-10-04 ASSESSMENT — PAIN SCALES - GENERAL
PAINLEVEL_OUTOF10: 3
PAINLEVEL_OUTOF10: 4
PAINLEVEL_OUTOF10: 3
PAINLEVEL_OUTOF10: 0
PAINLEVEL_OUTOF10: 1
PAINLEVEL_OUTOF10: 4

## 2019-10-04 ASSESSMENT — PAIN DESCRIPTION - PAIN TYPE
TYPE: SURGICAL PAIN
TYPE: SURGICAL PAIN

## 2019-10-04 ASSESSMENT — PAIN DESCRIPTION - ORIENTATION
ORIENTATION: LEFT
ORIENTATION: LEFT

## 2019-10-04 ASSESSMENT — PAIN DESCRIPTION - LOCATION
LOCATION: SHOULDER
LOCATION: SHOULDER

## 2019-10-07 ENCOUNTER — TELEPHONE (OUTPATIENT)
Dept: ORTHOPEDIC SURGERY | Age: 59
End: 2019-10-07

## 2019-10-07 DIAGNOSIS — Z96.612 STATUS POST TOTAL SHOULDER REPLACEMENT, LEFT: ICD-10-CM

## 2019-10-09 ENCOUNTER — OFFICE VISIT (OUTPATIENT)
Dept: CARDIOLOGY CLINIC | Age: 59
End: 2019-10-09
Payer: COMMERCIAL

## 2019-10-09 VITALS
BODY MASS INDEX: 28.01 KG/M2 | WEIGHT: 200.8 LBS | DIASTOLIC BLOOD PRESSURE: 80 MMHG | HEART RATE: 60 BPM | SYSTOLIC BLOOD PRESSURE: 116 MMHG

## 2019-10-09 DIAGNOSIS — I42.0 DILATED CARDIOMYOPATHY (HCC): ICD-10-CM

## 2019-10-09 DIAGNOSIS — E78.2 MIXED HYPERLIPIDEMIA: ICD-10-CM

## 2019-10-09 DIAGNOSIS — I50.22 CHRONIC SYSTOLIC CONGESTIVE HEART FAILURE (HCC): Primary | ICD-10-CM

## 2019-10-09 DIAGNOSIS — Z98.890 S/P CORONARY ANGIOGRAM: ICD-10-CM

## 2019-10-09 LAB
ANION GAP SERPL CALCULATED.3IONS-SCNC: 17 MMOL/L (ref 3–16)
BUN BLDV-MCNC: 16 MG/DL (ref 7–20)
CALCIUM SERPL-MCNC: 10.1 MG/DL (ref 8.3–10.6)
CHLORIDE BLD-SCNC: 97 MMOL/L (ref 99–110)
CO2: 25 MMOL/L (ref 21–32)
CREAT SERPL-MCNC: 1 MG/DL (ref 0.9–1.3)
GFR AFRICAN AMERICAN: >60
GFR NON-AFRICAN AMERICAN: >60
GLUCOSE BLD-MCNC: 95 MG/DL (ref 70–99)
POTASSIUM SERPL-SCNC: 5.1 MMOL/L (ref 3.5–5.1)
SODIUM BLD-SCNC: 139 MMOL/L (ref 136–145)

## 2019-10-09 PROCEDURE — 99214 OFFICE O/P EST MOD 30 MIN: CPT | Performed by: NURSE PRACTITIONER

## 2019-10-09 PROCEDURE — 1036F TOBACCO NON-USER: CPT | Performed by: NURSE PRACTITIONER

## 2019-10-09 PROCEDURE — 3017F COLORECTAL CA SCREEN DOC REV: CPT | Performed by: NURSE PRACTITIONER

## 2019-10-09 PROCEDURE — 1111F DSCHRG MED/CURRENT MED MERGE: CPT | Performed by: NURSE PRACTITIONER

## 2019-10-09 PROCEDURE — G8484 FLU IMMUNIZE NO ADMIN: HCPCS | Performed by: NURSE PRACTITIONER

## 2019-10-09 PROCEDURE — G8419 CALC BMI OUT NRM PARAM NOF/U: HCPCS | Performed by: NURSE PRACTITIONER

## 2019-10-09 PROCEDURE — G8427 DOCREV CUR MEDS BY ELIG CLIN: HCPCS | Performed by: NURSE PRACTITIONER

## 2019-10-09 RX ORDER — LISINOPRIL 5 MG/1
5 TABLET ORAL DAILY
Qty: 90 TABLET | Refills: 3 | Status: SHIPPED | OUTPATIENT
Start: 2019-10-09 | End: 2019-10-10

## 2019-10-10 RX ORDER — LISINOPRIL 2.5 MG/1
2.5 TABLET ORAL DAILY
Qty: 90 TABLET | Refills: 3
Start: 2019-10-10 | End: 2020-01-16 | Stop reason: SDUPTHER

## 2019-10-11 ENCOUNTER — OFFICE VISIT (OUTPATIENT)
Dept: ORTHOPEDIC SURGERY | Age: 59
End: 2019-10-11

## 2019-10-11 DIAGNOSIS — Z96.612 STATUS POST TOTAL SHOULDER REPLACEMENT, LEFT: Primary | ICD-10-CM

## 2019-10-11 PROCEDURE — 99024 POSTOP FOLLOW-UP VISIT: CPT | Performed by: ORTHOPAEDIC SURGERY

## 2019-10-15 DIAGNOSIS — Z96.612 STATUS POST TOTAL SHOULDER REPLACEMENT, LEFT: Primary | ICD-10-CM

## 2019-10-15 RX ORDER — OXYCODONE HYDROCHLORIDE AND ACETAMINOPHEN 5; 325 MG/1; MG/1
1 TABLET ORAL EVERY 8 HOURS PRN
Qty: 18 TABLET | Refills: 0 | Status: SHIPPED | OUTPATIENT
Start: 2019-10-15 | End: 2019-10-22

## 2019-10-16 ENCOUNTER — TELEPHONE (OUTPATIENT)
Dept: ORTHOPEDIC SURGERY | Age: 59
End: 2019-10-16

## 2019-10-16 DIAGNOSIS — Z96.612 STATUS POST TOTAL SHOULDER REPLACEMENT, LEFT: ICD-10-CM

## 2019-10-17 ENCOUNTER — HOSPITAL ENCOUNTER (OUTPATIENT)
Dept: PHYSICAL THERAPY | Age: 59
Setting detail: THERAPIES SERIES
Discharge: HOME | End: 2019-10-17
Payer: COMMERCIAL

## 2019-10-17 PROCEDURE — 97161 PT EVAL LOW COMPLEX 20 MIN: CPT | Performed by: PHYSICAL THERAPIST

## 2019-10-17 PROCEDURE — 97140 MANUAL THERAPY 1/> REGIONS: CPT | Performed by: PHYSICAL THERAPIST

## 2019-10-17 PROCEDURE — 97110 THERAPEUTIC EXERCISES: CPT | Performed by: PHYSICAL THERAPIST

## 2019-10-22 ENCOUNTER — HOSPITAL ENCOUNTER (OUTPATIENT)
Dept: PHYSICAL THERAPY | Age: 59
Setting detail: THERAPIES SERIES
Discharge: HOME | End: 2019-10-22
Payer: COMMERCIAL

## 2019-10-24 ENCOUNTER — HOSPITAL ENCOUNTER (OUTPATIENT)
Dept: PHYSICAL THERAPY | Age: 59
Setting detail: THERAPIES SERIES
Discharge: HOME | End: 2019-10-24
Payer: COMMERCIAL

## 2019-10-24 PROCEDURE — 97140 MANUAL THERAPY 1/> REGIONS: CPT | Performed by: PHYSICAL THERAPIST

## 2019-10-24 PROCEDURE — 97110 THERAPEUTIC EXERCISES: CPT | Performed by: PHYSICAL THERAPIST

## 2019-10-28 ENCOUNTER — HOSPITAL ENCOUNTER (OUTPATIENT)
Dept: PHYSICAL THERAPY | Age: 59
Setting detail: THERAPIES SERIES
Discharge: HOME | End: 2019-10-28
Payer: COMMERCIAL

## 2019-10-31 ENCOUNTER — HOSPITAL ENCOUNTER (OUTPATIENT)
Dept: PHYSICAL THERAPY | Age: 59
Setting detail: THERAPIES SERIES
Discharge: HOME | End: 2019-10-31
Payer: COMMERCIAL

## 2019-10-31 PROCEDURE — 97110 THERAPEUTIC EXERCISES: CPT | Performed by: PHYSICAL THERAPIST

## 2019-10-31 PROCEDURE — 97140 MANUAL THERAPY 1/> REGIONS: CPT | Performed by: PHYSICAL THERAPIST

## 2019-11-01 PROBLEM — Z01.818 PRE-OP EVALUATION: Status: RESOLVED | Noted: 2019-10-02 | Resolved: 2019-11-01

## 2019-11-01 RX ORDER — CHOLECALCIFEROL (VITAMIN D3) 125 MCG
50000 TABLET ORAL WEEKLY
Qty: 4 TABLET | Refills: 0 | Status: SHIPPED | OUTPATIENT
Start: 2019-11-01 | End: 2020-01-16 | Stop reason: SDUPTHER

## 2019-11-01 RX ORDER — SERTRALINE HYDROCHLORIDE 100 MG/1
100 TABLET, FILM COATED ORAL DAILY
Qty: 30 TABLET | Refills: 0 | Status: SHIPPED | OUTPATIENT
Start: 2019-11-01 | End: 2019-11-30 | Stop reason: SDUPTHER

## 2019-11-01 RX ORDER — BUPROPION HYDROCHLORIDE 300 MG/1
300 TABLET ORAL DAILY
Qty: 30 TABLET | Refills: 0 | Status: SHIPPED | OUTPATIENT
Start: 2019-11-01 | End: 2019-12-06 | Stop reason: SDUPTHER

## 2019-11-04 ENCOUNTER — HOSPITAL ENCOUNTER (OUTPATIENT)
Dept: PHYSICAL THERAPY | Age: 59
Setting detail: THERAPIES SERIES
Discharge: HOME | End: 2019-11-04
Payer: COMMERCIAL

## 2019-11-04 ENCOUNTER — TELEPHONE (OUTPATIENT)
Dept: FAMILY MEDICINE CLINIC | Age: 59
End: 2019-11-04

## 2019-11-04 PROCEDURE — 97110 THERAPEUTIC EXERCISES: CPT | Performed by: PHYSICAL THERAPIST

## 2019-11-04 PROCEDURE — 97140 MANUAL THERAPY 1/> REGIONS: CPT | Performed by: PHYSICAL THERAPIST

## 2019-11-07 ENCOUNTER — HOSPITAL ENCOUNTER (OUTPATIENT)
Dept: PHYSICAL THERAPY | Age: 59
Setting detail: THERAPIES SERIES
Discharge: HOME | End: 2019-11-07
Payer: COMMERCIAL

## 2019-11-07 PROCEDURE — 97110 THERAPEUTIC EXERCISES: CPT | Performed by: PHYSICAL THERAPIST

## 2019-11-07 PROCEDURE — 97140 MANUAL THERAPY 1/> REGIONS: CPT | Performed by: PHYSICAL THERAPIST

## 2019-11-08 ENCOUNTER — OFFICE VISIT (OUTPATIENT)
Dept: ORTHOPEDIC SURGERY | Age: 59
End: 2019-11-08

## 2019-11-08 DIAGNOSIS — M19.019 SHOULDER ARTHRITIS: ICD-10-CM

## 2019-11-08 DIAGNOSIS — Z96.612 STATUS POST TOTAL SHOULDER REPLACEMENT, LEFT: Primary | ICD-10-CM

## 2019-11-08 PROCEDURE — 99024 POSTOP FOLLOW-UP VISIT: CPT | Performed by: ORTHOPAEDIC SURGERY

## 2019-11-11 ENCOUNTER — HOSPITAL ENCOUNTER (OUTPATIENT)
Dept: PHYSICAL THERAPY | Age: 59
Setting detail: THERAPIES SERIES
Discharge: HOME | End: 2019-11-11
Payer: COMMERCIAL

## 2019-11-11 PROCEDURE — 97110 THERAPEUTIC EXERCISES: CPT | Performed by: SPECIALIST/TECHNOLOGIST

## 2019-11-11 PROCEDURE — 97140 MANUAL THERAPY 1/> REGIONS: CPT | Performed by: SPECIALIST/TECHNOLOGIST

## 2019-11-14 ENCOUNTER — HOSPITAL ENCOUNTER (OUTPATIENT)
Dept: PHYSICAL THERAPY | Age: 59
Setting detail: THERAPIES SERIES
Discharge: HOME | End: 2019-11-14
Payer: COMMERCIAL

## 2019-11-14 PROCEDURE — 97140 MANUAL THERAPY 1/> REGIONS: CPT | Performed by: SPECIALIST/TECHNOLOGIST

## 2019-11-14 PROCEDURE — 97110 THERAPEUTIC EXERCISES: CPT | Performed by: SPECIALIST/TECHNOLOGIST

## 2019-11-18 ENCOUNTER — HOSPITAL ENCOUNTER (OUTPATIENT)
Dept: PHYSICAL THERAPY | Age: 59
Setting detail: THERAPIES SERIES
Discharge: HOME | End: 2019-11-18
Payer: COMMERCIAL

## 2019-11-18 PROCEDURE — 97140 MANUAL THERAPY 1/> REGIONS: CPT | Performed by: SPECIALIST/TECHNOLOGIST

## 2019-11-18 PROCEDURE — 97110 THERAPEUTIC EXERCISES: CPT | Performed by: SPECIALIST/TECHNOLOGIST

## 2019-11-20 ENCOUNTER — HOSPITAL ENCOUNTER (OUTPATIENT)
Dept: PHYSICAL THERAPY | Age: 59
Setting detail: THERAPIES SERIES
Discharge: HOME | End: 2019-11-20
Payer: COMMERCIAL

## 2019-11-20 PROCEDURE — 97110 THERAPEUTIC EXERCISES: CPT | Performed by: SPECIALIST/TECHNOLOGIST

## 2019-11-20 PROCEDURE — 97140 MANUAL THERAPY 1/> REGIONS: CPT | Performed by: SPECIALIST/TECHNOLOGIST

## 2019-11-20 PROCEDURE — 97112 NEUROMUSCULAR REEDUCATION: CPT | Performed by: SPECIALIST/TECHNOLOGIST

## 2019-11-25 ENCOUNTER — OFFICE VISIT (OUTPATIENT)
Dept: CARDIOLOGY CLINIC | Age: 59
End: 2019-11-25
Payer: COMMERCIAL

## 2019-11-25 ENCOUNTER — HOSPITAL ENCOUNTER (OUTPATIENT)
Dept: PHYSICAL THERAPY | Age: 59
Setting detail: THERAPIES SERIES
Discharge: HOME | End: 2019-11-25
Payer: COMMERCIAL

## 2019-11-25 VITALS
WEIGHT: 203.6 LBS | SYSTOLIC BLOOD PRESSURE: 130 MMHG | DIASTOLIC BLOOD PRESSURE: 70 MMHG | HEART RATE: 68 BPM | BODY MASS INDEX: 28.4 KG/M2

## 2019-11-25 DIAGNOSIS — I48.0 PAROXYSMAL ATRIAL FIBRILLATION (HCC): ICD-10-CM

## 2019-11-25 DIAGNOSIS — I42.0 DILATED CARDIOMYOPATHY (HCC): Primary | ICD-10-CM

## 2019-11-25 DIAGNOSIS — E78.5 HYPERLIPIDEMIA, UNSPECIFIED HYPERLIPIDEMIA TYPE: ICD-10-CM

## 2019-11-25 PROCEDURE — G8419 CALC BMI OUT NRM PARAM NOF/U: HCPCS | Performed by: INTERNAL MEDICINE

## 2019-11-25 PROCEDURE — 1036F TOBACCO NON-USER: CPT | Performed by: INTERNAL MEDICINE

## 2019-11-25 PROCEDURE — 97110 THERAPEUTIC EXERCISES: CPT | Performed by: SPECIALIST/TECHNOLOGIST

## 2019-11-25 PROCEDURE — 97140 MANUAL THERAPY 1/> REGIONS: CPT | Performed by: SPECIALIST/TECHNOLOGIST

## 2019-11-25 PROCEDURE — G8484 FLU IMMUNIZE NO ADMIN: HCPCS | Performed by: INTERNAL MEDICINE

## 2019-11-25 PROCEDURE — G8428 CUR MEDS NOT DOCUMENT: HCPCS | Performed by: INTERNAL MEDICINE

## 2019-11-25 PROCEDURE — 99214 OFFICE O/P EST MOD 30 MIN: CPT | Performed by: INTERNAL MEDICINE

## 2019-11-25 PROCEDURE — 3017F COLORECTAL CA SCREEN DOC REV: CPT | Performed by: INTERNAL MEDICINE

## 2019-11-25 RX ORDER — METOPROLOL SUCCINATE 25 MG/1
25 TABLET, EXTENDED RELEASE ORAL DAILY
Qty: 90 TABLET | Refills: 3 | Status: SHIPPED | OUTPATIENT
Start: 2019-11-25 | End: 2020-01-16 | Stop reason: SDUPTHER

## 2019-11-25 ASSESSMENT — ENCOUNTER SYMPTOMS
APNEA: 0
ABDOMINAL DISTENTION: 0
SHORTNESS OF BREATH: 0
CHEST TIGHTNESS: 0
COUGH: 0
CHOKING: 0

## 2019-11-27 ENCOUNTER — HOSPITAL ENCOUNTER (OUTPATIENT)
Dept: PHYSICAL THERAPY | Age: 59
Setting detail: THERAPIES SERIES
Discharge: HOME | End: 2019-11-27
Payer: COMMERCIAL

## 2019-11-27 ENCOUNTER — OFFICE VISIT (OUTPATIENT)
Dept: INTERNAL MEDICINE CLINIC | Age: 59
End: 2019-11-27
Payer: COMMERCIAL

## 2019-11-27 VITALS
WEIGHT: 203 LBS | BODY MASS INDEX: 28.31 KG/M2 | SYSTOLIC BLOOD PRESSURE: 124 MMHG | OXYGEN SATURATION: 94 % | HEART RATE: 111 BPM | DIASTOLIC BLOOD PRESSURE: 80 MMHG

## 2019-11-27 DIAGNOSIS — J02.9 VIRAL PHARYNGITIS: ICD-10-CM

## 2019-11-27 PROCEDURE — G8419 CALC BMI OUT NRM PARAM NOF/U: HCPCS | Performed by: NURSE PRACTITIONER

## 2019-11-27 PROCEDURE — 1036F TOBACCO NON-USER: CPT | Performed by: NURSE PRACTITIONER

## 2019-11-27 PROCEDURE — G8484 FLU IMMUNIZE NO ADMIN: HCPCS | Performed by: NURSE PRACTITIONER

## 2019-11-27 PROCEDURE — G8427 DOCREV CUR MEDS BY ELIG CLIN: HCPCS | Performed by: NURSE PRACTITIONER

## 2019-11-27 PROCEDURE — 97140 MANUAL THERAPY 1/> REGIONS: CPT | Performed by: SPECIALIST/TECHNOLOGIST

## 2019-11-27 PROCEDURE — 3017F COLORECTAL CA SCREEN DOC REV: CPT | Performed by: NURSE PRACTITIONER

## 2019-11-27 PROCEDURE — 97110 THERAPEUTIC EXERCISES: CPT | Performed by: SPECIALIST/TECHNOLOGIST

## 2019-11-27 PROCEDURE — 99213 OFFICE O/P EST LOW 20 MIN: CPT | Performed by: NURSE PRACTITIONER

## 2019-11-27 ASSESSMENT — ENCOUNTER SYMPTOMS
COLOR CHANGE: 0
BACK PAIN: 0
ABDOMINAL PAIN: 0
CONSTIPATION: 0
SINUS PAIN: 0
SORE THROAT: 1
COUGH: 1
SHORTNESS OF BREATH: 0
WHEEZING: 0
SINUS PRESSURE: 0
RHINORRHEA: 1
DIARRHEA: 0

## 2019-12-02 ENCOUNTER — APPOINTMENT (OUTPATIENT)
Dept: PHYSICAL THERAPY | Age: 59
End: 2019-12-02
Payer: COMMERCIAL

## 2019-12-04 ENCOUNTER — HOSPITAL ENCOUNTER (OUTPATIENT)
Dept: PHYSICAL THERAPY | Age: 59
Setting detail: THERAPIES SERIES
Discharge: HOME | End: 2019-12-04
Payer: COMMERCIAL

## 2019-12-04 PROCEDURE — 97140 MANUAL THERAPY 1/> REGIONS: CPT | Performed by: SPECIALIST/TECHNOLOGIST

## 2019-12-04 PROCEDURE — 97110 THERAPEUTIC EXERCISES: CPT | Performed by: SPECIALIST/TECHNOLOGIST

## 2019-12-06 ENCOUNTER — HOSPITAL ENCOUNTER (OUTPATIENT)
Dept: PHYSICAL THERAPY | Age: 59
Setting detail: THERAPIES SERIES
Discharge: HOME | End: 2019-12-06
Payer: COMMERCIAL

## 2019-12-06 PROCEDURE — 97110 THERAPEUTIC EXERCISES: CPT | Performed by: SPECIALIST/TECHNOLOGIST

## 2019-12-06 PROCEDURE — 97140 MANUAL THERAPY 1/> REGIONS: CPT | Performed by: SPECIALIST/TECHNOLOGIST

## 2019-12-06 RX ORDER — BUPROPION HYDROCHLORIDE 300 MG/1
TABLET ORAL
Qty: 30 TABLET | Refills: 2 | Status: SHIPPED | OUTPATIENT
Start: 2019-12-06 | End: 2020-01-16 | Stop reason: SDUPTHER

## 2019-12-10 ENCOUNTER — HOSPITAL ENCOUNTER (OUTPATIENT)
Dept: PHYSICAL THERAPY | Age: 59
Setting detail: THERAPIES SERIES
Discharge: HOME | End: 2019-12-10
Payer: COMMERCIAL

## 2019-12-10 PROCEDURE — 97110 THERAPEUTIC EXERCISES: CPT | Performed by: PHYSICAL THERAPIST

## 2019-12-10 PROCEDURE — 97140 MANUAL THERAPY 1/> REGIONS: CPT | Performed by: PHYSICAL THERAPIST

## 2019-12-12 ENCOUNTER — HOSPITAL ENCOUNTER (OUTPATIENT)
Dept: PHYSICAL THERAPY | Age: 59
Setting detail: THERAPIES SERIES
Discharge: HOME | End: 2019-12-12
Payer: COMMERCIAL

## 2019-12-12 PROCEDURE — 97110 THERAPEUTIC EXERCISES: CPT | Performed by: SPECIALIST/TECHNOLOGIST

## 2019-12-12 PROCEDURE — 97140 MANUAL THERAPY 1/> REGIONS: CPT | Performed by: SPECIALIST/TECHNOLOGIST

## 2019-12-17 ENCOUNTER — HOSPITAL ENCOUNTER (OUTPATIENT)
Dept: PHYSICAL THERAPY | Age: 59
Setting detail: THERAPIES SERIES
Discharge: HOME | End: 2019-12-17
Payer: COMMERCIAL

## 2019-12-17 PROCEDURE — 97110 THERAPEUTIC EXERCISES: CPT | Performed by: PHYSICAL THERAPIST

## 2019-12-17 PROCEDURE — 97140 MANUAL THERAPY 1/> REGIONS: CPT | Performed by: PHYSICAL THERAPIST

## 2019-12-19 ENCOUNTER — HOSPITAL ENCOUNTER (OUTPATIENT)
Dept: PHYSICAL THERAPY | Age: 59
Setting detail: THERAPIES SERIES
Discharge: HOME | End: 2019-12-19
Payer: COMMERCIAL

## 2019-12-19 PROCEDURE — 97110 THERAPEUTIC EXERCISES: CPT | Performed by: SPECIALIST/TECHNOLOGIST

## 2019-12-19 PROCEDURE — 97140 MANUAL THERAPY 1/> REGIONS: CPT | Performed by: SPECIALIST/TECHNOLOGIST

## 2019-12-20 ENCOUNTER — OFFICE VISIT (OUTPATIENT)
Dept: ORTHOPEDIC SURGERY | Age: 59
End: 2019-12-20

## 2019-12-20 VITALS — WEIGHT: 203.04 LBS | BODY MASS INDEX: 28.43 KG/M2 | HEIGHT: 71 IN

## 2019-12-20 DIAGNOSIS — Z96.612 STATUS POST TOTAL SHOULDER REPLACEMENT, LEFT: Primary | ICD-10-CM

## 2019-12-20 PROCEDURE — 99024 POSTOP FOLLOW-UP VISIT: CPT | Performed by: ORTHOPAEDIC SURGERY

## 2019-12-23 ENCOUNTER — HOSPITAL ENCOUNTER (OUTPATIENT)
Dept: PHYSICAL THERAPY | Age: 59
Setting detail: THERAPIES SERIES
Discharge: HOME | End: 2019-12-23
Payer: COMMERCIAL

## 2019-12-23 PROCEDURE — 97110 THERAPEUTIC EXERCISES: CPT | Performed by: PHYSICAL THERAPIST

## 2019-12-23 PROCEDURE — 97140 MANUAL THERAPY 1/> REGIONS: CPT | Performed by: PHYSICAL THERAPIST

## 2019-12-24 ENCOUNTER — TELEPHONE (OUTPATIENT)
Dept: CARDIOLOGY CLINIC | Age: 59
End: 2019-12-24

## 2019-12-24 ENCOUNTER — OFFICE VISIT (OUTPATIENT)
Dept: FAMILY MEDICINE CLINIC | Age: 59
End: 2019-12-24
Payer: COMMERCIAL

## 2019-12-24 VITALS
TEMPERATURE: 97.4 F | HEART RATE: 78 BPM | RESPIRATION RATE: 16 BRPM | DIASTOLIC BLOOD PRESSURE: 76 MMHG | OXYGEN SATURATION: 98 % | BODY MASS INDEX: 27.04 KG/M2 | SYSTOLIC BLOOD PRESSURE: 122 MMHG | HEIGHT: 73 IN | WEIGHT: 204 LBS

## 2019-12-24 DIAGNOSIS — M25.512 ACUTE PAIN OF LEFT SHOULDER: ICD-10-CM

## 2019-12-24 DIAGNOSIS — C81.90 HODGKIN LYMPHOMA, UNSPECIFIED HODGKIN LYMPHOMA TYPE, UNSPECIFIED BODY REGION (HCC): ICD-10-CM

## 2019-12-24 DIAGNOSIS — I42.0 DILATED CARDIOMYOPATHY (HCC): ICD-10-CM

## 2019-12-24 DIAGNOSIS — F33.0 MILD EPISODE OF RECURRENT MAJOR DEPRESSIVE DISORDER (HCC): ICD-10-CM

## 2019-12-24 DIAGNOSIS — Z86.79 HISTORY OF ATRIAL FIBRILLATION: ICD-10-CM

## 2019-12-24 DIAGNOSIS — E78.2 MIXED HYPERLIPIDEMIA: ICD-10-CM

## 2019-12-24 DIAGNOSIS — J30.1 SEASONAL ALLERGIC RHINITIS DUE TO POLLEN: ICD-10-CM

## 2019-12-24 DIAGNOSIS — I10 ESSENTIAL HYPERTENSION: Primary | ICD-10-CM

## 2019-12-24 DIAGNOSIS — Z13.1 DIABETES MELLITUS SCREENING: ICD-10-CM

## 2019-12-24 PROCEDURE — 3017F COLORECTAL CA SCREEN DOC REV: CPT | Performed by: FAMILY MEDICINE

## 2019-12-24 PROCEDURE — G8484 FLU IMMUNIZE NO ADMIN: HCPCS | Performed by: FAMILY MEDICINE

## 2019-12-24 PROCEDURE — G8419 CALC BMI OUT NRM PARAM NOF/U: HCPCS | Performed by: FAMILY MEDICINE

## 2019-12-24 PROCEDURE — 1036F TOBACCO NON-USER: CPT | Performed by: FAMILY MEDICINE

## 2019-12-24 PROCEDURE — 99214 OFFICE O/P EST MOD 30 MIN: CPT | Performed by: FAMILY MEDICINE

## 2019-12-24 PROCEDURE — G8427 DOCREV CUR MEDS BY ELIG CLIN: HCPCS | Performed by: FAMILY MEDICINE

## 2019-12-24 RX ORDER — FLUTICASONE PROPIONATE 50 MCG
1 SPRAY, SUSPENSION (ML) NASAL DAILY
Qty: 1 BOTTLE | Refills: 2 | Status: SHIPPED | OUTPATIENT
Start: 2019-12-24 | End: 2020-06-23 | Stop reason: SDUPTHER

## 2019-12-24 ASSESSMENT — ENCOUNTER SYMPTOMS
EYE ITCHING: 0
EYE REDNESS: 0
ABDOMINAL PAIN: 0
COUGH: 1
CHEST TIGHTNESS: 0
STRIDOR: 0
ABDOMINAL DISTENTION: 0
SINUS PRESSURE: 0
NAUSEA: 0
WHEEZING: 0
EYE DISCHARGE: 0
TROUBLE SWALLOWING: 0
VOICE CHANGE: 0
RECTAL PAIN: 0
EYE PAIN: 0
PHOTOPHOBIA: 0
SINUS PAIN: 0
CHOKING: 0
APNEA: 0
SORE THROAT: 0
SHORTNESS OF BREATH: 0
ANAL BLEEDING: 0
BLOOD IN STOOL: 0
VOMITING: 0
DIARRHEA: 0
CONSTIPATION: 0
FACIAL SWELLING: 0

## 2019-12-24 NOTE — TELEPHONE ENCOUNTER
Rosalina faxed on the over cardiac clearance for Anette Section,  he is having a Colonoscope on Friday with Dr Minda Russell. Cheryl Andrews states that she sent the first fax on the 18th and called as well and no response. The office is open until 1 please call Cheryl Andrews back at [phone] and you can fax the letter to [phone] they need this today.

## 2019-12-31 ENCOUNTER — HOSPITAL ENCOUNTER (OUTPATIENT)
Dept: PHYSICAL THERAPY | Age: 59
Setting detail: THERAPIES SERIES
Discharge: HOME | End: 2019-12-31
Payer: COMMERCIAL

## 2019-12-31 PROCEDURE — 97140 MANUAL THERAPY 1/> REGIONS: CPT | Performed by: PHYSICAL THERAPIST

## 2019-12-31 PROCEDURE — 97110 THERAPEUTIC EXERCISES: CPT | Performed by: PHYSICAL THERAPIST

## 2019-12-31 NOTE — FLOWSHEET NOTE
and UE control with self care, reaching, carrying, lifting, house/yardwork, driving/computer work  [] (50961) Reviewed/Progressed HEP activities related to improving balance, coordination, kinesthetic sense, posture, motor skill, proprioception of scapular, scapulothoracic and UE control with self care, reaching, carrying, lifting, house/yardwork, driving/computer work      Manual Treatments:  PROM / STM / Oscillations-Mobs:  G-I, II, III, IV (PA's, Inf., Post.)  [x] (62871) Provided manual therapy to mobilize soft tissue/joints of cervical/CT, scapular GHJ and UE for the purpose of modulating pain, promoting relaxation,  increasing ROM, reducing/eliminating soft tissue swelling/inflammation/restriction, improving soft tissue extensibility and allowing for proper ROM for normal function with self care, reaching, carrying, lifting, house/yardwork, driving/computer work    Modalities:  CP x 15'  (Hx of cancer)    Charges:  Timed Code Treatment Minutes: 40   Total Treatment Minutes: 55     [] EVAL (LOW) 69731 (typically 20 minutes face-to-face)  [] EVAL (MOD) 05190 (typically 30 minutes face-to-face)  [] EVAL (HIGH) 03824 (typically 45 minutes face-to-face)  [] RE-EVAL     [x] AX(06542) x  2   [] IONTO  [] NMR (72063) x     [] VASO  [x] Manual (88681) x 1     [] Other:  [] TA x      [] Mech Traction (66072)  [] ES(attended) (60714)      [] ES (un) (20969):     GOALS:  Short Term Goals: To be achieved in: 2 weeks  1. Independent in HEP and progression per patient tolerance, in order to prevent re-injury. [] Progressing: [x] Met: [] Not Met: [] Adjusted  2. Patient will have a decrease in pain to facilitate improvement in movement, function, and ADLs as indicated by Functional Deficits. [x] Progressing: [] Met: [] Not Met: [] Adjusted    Long Term Goals: To be achieved in: 12 weeks  1. Disability index score of 20% or less for the Quickdash to assist with reaching prior level of function.    [x] Progressing: [] Met: []

## 2020-01-03 ENCOUNTER — ANTI-COAG VISIT (OUTPATIENT)
Dept: FAMILY MEDICINE CLINIC | Age: 60
End: 2020-01-03

## 2020-01-16 RX ORDER — METOPROLOL SUCCINATE 25 MG/1
25 TABLET, EXTENDED RELEASE ORAL DAILY
Qty: 90 TABLET | Refills: 2 | Status: SHIPPED | OUTPATIENT
Start: 2020-01-16 | End: 2020-01-31 | Stop reason: SDUPTHER

## 2020-01-16 RX ORDER — ATORVASTATIN CALCIUM 40 MG/1
40 TABLET, FILM COATED ORAL DAILY
Qty: 90 TABLET | Refills: 2 | Status: SHIPPED | OUTPATIENT
Start: 2020-01-16 | End: 2020-01-31 | Stop reason: SDUPTHER

## 2020-01-16 RX ORDER — LISINOPRIL 2.5 MG/1
2.5 TABLET ORAL DAILY
Qty: 90 TABLET | Refills: 2 | Status: SHIPPED | OUTPATIENT
Start: 2020-01-16 | End: 2020-01-31 | Stop reason: SDUPTHER

## 2020-01-16 RX ORDER — SERTRALINE HYDROCHLORIDE 100 MG/1
TABLET, FILM COATED ORAL
Qty: 90 TABLET | Refills: 0 | Status: SHIPPED | OUTPATIENT
Start: 2020-01-16 | End: 2020-04-05

## 2020-01-16 RX ORDER — CHOLECALCIFEROL (VITAMIN D3) 125 MCG
50000 TABLET ORAL WEEKLY
Qty: 4 TABLET | Refills: 0 | Status: SHIPPED | OUTPATIENT
Start: 2020-01-16 | End: 2020-01-21 | Stop reason: SDUPTHER

## 2020-01-16 RX ORDER — BUPROPION HYDROCHLORIDE 300 MG/1
TABLET ORAL
Qty: 90 TABLET | Refills: 0 | Status: SHIPPED | OUTPATIENT
Start: 2020-01-16 | End: 2020-04-05

## 2020-01-16 RX ORDER — ASPIRIN 81 MG/1
81 TABLET ORAL DAILY
Qty: 90 TABLET | Refills: 2 | Status: SHIPPED | OUTPATIENT
Start: 2020-01-16 | End: 2020-01-31 | Stop reason: SDUPTHER

## 2020-01-16 NOTE — TELEPHONE ENCOUNTER
Meidication:   Requested Prescriptions     Pending Prescriptions Disp Refills    metoprolol succinate (TOPROL XL) 25 MG extended release tablet 90 tablet 2     Sig: Take 1 tablet by mouth daily    lisinopril (PRINIVIL;ZESTRIL) 2.5 MG tablet 90 tablet 2     Sig: Take 1 tablet by mouth daily    aspirin EC 81 MG EC tablet 90 tablet 2     Sig: Take 1 tablet by mouth daily    atorvastatin (LIPITOR) 40 MG tablet 90 tablet 2     Sig: Take 1 tablet by mouth daily     Last OV:11/25/2019  Last LAB:02/24/2020

## 2020-01-21 ENCOUNTER — TELEPHONE (OUTPATIENT)
Dept: CARDIOLOGY CLINIC | Age: 60
End: 2020-01-21

## 2020-01-21 RX ORDER — CHOLECALCIFEROL (VITAMIN D3) 125 MCG
50000 TABLET ORAL WEEKLY
Qty: 4 TABLET | Refills: 2 | Status: SHIPPED | OUTPATIENT
Start: 2020-01-21 | End: 2020-06-23

## 2020-01-21 NOTE — TELEPHONE ENCOUNTER
Please call [facility] [phone]    They are calling about the 4 refills that were sent to [facility]    Ref # [id number]

## 2020-01-21 NOTE — TELEPHONE ENCOUNTER
Called pt to verify which pharmacy he uses. These scripts were sent to [facility].

## 2020-02-05 RX ORDER — METOPROLOL SUCCINATE 25 MG/1
25 TABLET, EXTENDED RELEASE ORAL DAILY
Qty: 90 TABLET | Refills: 3 | Status: SHIPPED | OUTPATIENT
Start: 2020-02-05 | End: 2021-04-30 | Stop reason: SDUPTHER

## 2020-02-05 RX ORDER — ATORVASTATIN CALCIUM 40 MG/1
40 TABLET, FILM COATED ORAL DAILY
Qty: 90 TABLET | Refills: 3 | Status: SHIPPED | OUTPATIENT
Start: 2020-02-05 | End: 2021-04-30 | Stop reason: SDUPTHER

## 2020-02-05 RX ORDER — LISINOPRIL 2.5 MG/1
2.5 TABLET ORAL DAILY
Qty: 90 TABLET | Refills: 3 | Status: SHIPPED | OUTPATIENT
Start: 2020-02-05 | End: 2020-02-24

## 2020-02-05 RX ORDER — ASPIRIN 81 MG/1
81 TABLET ORAL DAILY
Qty: 90 TABLET | Refills: 3 | Status: SHIPPED | OUTPATIENT
Start: 2020-02-05

## 2020-02-19 RX ORDER — ERGOCALCIFEROL 1.25 MG/1
CAPSULE ORAL
Qty: 4 CAPSULE | Refills: 0 | Status: SHIPPED | OUTPATIENT
Start: 2020-02-19 | End: 2020-06-23 | Stop reason: SDUPTHER

## 2020-02-24 ENCOUNTER — OFFICE VISIT (OUTPATIENT)
Dept: CARDIOLOGY CLINIC | Age: 60
End: 2020-02-24
Payer: COMMERCIAL

## 2020-02-24 VITALS
HEART RATE: 63 BPM | DIASTOLIC BLOOD PRESSURE: 80 MMHG | BODY MASS INDEX: 27.43 KG/M2 | HEIGHT: 73 IN | WEIGHT: 207 LBS | SYSTOLIC BLOOD PRESSURE: 110 MMHG | OXYGEN SATURATION: 97 %

## 2020-02-24 PROCEDURE — 99214 OFFICE O/P EST MOD 30 MIN: CPT | Performed by: INTERNAL MEDICINE

## 2020-02-24 PROCEDURE — G8427 DOCREV CUR MEDS BY ELIG CLIN: HCPCS | Performed by: INTERNAL MEDICINE

## 2020-02-24 PROCEDURE — G8484 FLU IMMUNIZE NO ADMIN: HCPCS | Performed by: INTERNAL MEDICINE

## 2020-02-24 PROCEDURE — 3017F COLORECTAL CA SCREEN DOC REV: CPT | Performed by: INTERNAL MEDICINE

## 2020-02-24 PROCEDURE — G8419 CALC BMI OUT NRM PARAM NOF/U: HCPCS | Performed by: INTERNAL MEDICINE

## 2020-02-24 PROCEDURE — 1036F TOBACCO NON-USER: CPT | Performed by: INTERNAL MEDICINE

## 2020-02-24 RX ORDER — LOSARTAN POTASSIUM 25 MG/1
25 TABLET ORAL DAILY
Qty: 90 TABLET | Refills: 3 | Status: SHIPPED | OUTPATIENT
Start: 2020-02-24 | End: 2020-06-04 | Stop reason: SDUPTHER

## 2020-02-24 ASSESSMENT — ENCOUNTER SYMPTOMS
CHOKING: 0
COUGH: 0
APNEA: 0
CHEST TIGHTNESS: 0
SHORTNESS OF BREATH: 0
ABDOMINAL DISTENTION: 0

## 2020-02-24 NOTE — PROGRESS NOTES
Subjective:      Patient ID: Jameson Parham is a 64 y.o. male. Congestive Heart Failure   Pertinent negatives include no chest pain, fatigue, palpitations or shortness of breath. follow up for abnecho/cardiomyopathy/hyperlipid. Has hx of afib and echo ordered. Echo shows LV dysfunction. 2 episodes of afib last episode 2 yrs ago. . Remains stable. No chest pain. No sob. No syncope. No edema. No pnd/orthopnea. Past Medical History:   Diagnosis Date    Anesthesia complication     INCREASED HEART RATE AFTER PORT PLACEMENT    Arthritis     Atrial fibrillation [facility])     2 episodes:paroxysmal:prior cardiologist.    Depression     under care of psychiatry    Dilated cardiomyopathy ([facility] [address]     Hodgkin's disease ([facility] [address]     2000 hodgkins stage 4:Dr. Jose Luis Lazo    Hyperlipidemia     Neuropathy     Prolonged emergence from general anesthesia     X1    S/P colonoscopy 12/27/2019    Dr. Sarah Weber Vitamin D deficiency      Past Surgical History:   Procedure Laterality Date    COLONOSCOPY  2009    Nml per pt'.     ENDOSCOPY, COLON, DIAGNOSTIC      HERNIA REPAIR      2    KNEE SURGERY Right     OTHER SURGICAL HISTORY Left     thumb surgery    REPLACEMENT SHOULDER TOTAL Left 10/3/2019    LEFT TOTAL SHOULDER ARTHROPLASTY WITH INTERSCALENE BLOCK FOR PAIN CONTROL   [facility] performed by Caio Renteria MD at [address] ARTHROSCOPY      SHOULDER SURGERY Bilateral     rt shoulder LABRUM ROTATOR CUFF, LEFT ARTHROSCOPY    TUNNELED VENOUS PORT PLACEMENT      REMOVED     Social History     Socioeconomic History    Marital status:      Spouse name: Not on file    Number of children: Not on file    Years of education: Not on file    Highest education level: Not on file   Occupational History    Occupation: [employer] Financial resource strain: Not on file    Food insecurity:     Worry: Not on file     Inability: Not on file   Tevin well-developed. HENT:      Head: Normocephalic and atraumatic. Eyes:      General:         Right eye: No discharge. Left eye: No discharge. Neck:      Musculoskeletal: Normal range of motion and neck supple. Vascular: No JVD. Cardiovascular:      Rate and Rhythm: Normal rate and regular rhythm. Heart sounds: Normal heart sounds. No gallop. Pulmonary:      Effort: Pulmonary effort is normal. No respiratory distress. Breath sounds: Normal breath sounds. No stridor. No wheezing or rales. Abdominal:      General: Bowel sounds are normal.      Palpations: Abdomen is soft. Tenderness: There is no abdominal tenderness. Musculoskeletal: Normal range of motion. Skin:     General: Skin is warm and dry. Neurological:      Mental Status: He is alert and oriented to person, place, and time. Psychiatric:         Behavior: Behavior normal.         Thought Content: Thought content normal.         Assessment:       Diagnosis Orders   1. Dilated cardiomyopathy ([address]     2. Hyperlipidemia, unspecified hyperlipidemia type     3. Abnormal echocardiogram               Plan:      CV stable. No chf. No angina. Reviewed previous records and testing including echo 9/19 and cath 10/19. Tolerating lisinopril/toprol. Dry cough. Try off lsinopril  Losartan 25 mg daily. No changes. Continue to monitor. Follow up 3 months.          Laura Gresham MD

## 2020-03-11 ENCOUNTER — TELEPHONE (OUTPATIENT)
Dept: ORTHOPEDIC SURGERY | Age: 60
End: 2020-03-11

## 2020-06-04 RX ORDER — LOSARTAN POTASSIUM 25 MG/1
25 TABLET ORAL DAILY
Qty: 90 TABLET | Refills: 3 | Status: SHIPPED | OUTPATIENT
Start: 2020-06-04 | End: 2021-04-30 | Stop reason: SDUPTHER

## 2020-06-21 RX ORDER — BUPROPION HYDROCHLORIDE 300 MG/1
TABLET ORAL
Qty: 90 TABLET | Refills: 3 | OUTPATIENT
Start: 2020-06-21

## 2020-06-21 RX ORDER — SERTRALINE HYDROCHLORIDE 100 MG/1
TABLET, FILM COATED ORAL
Qty: 90 TABLET | Refills: 3 | OUTPATIENT
Start: 2020-06-21

## 2020-06-23 ENCOUNTER — VIRTUAL VISIT (OUTPATIENT)
Dept: FAMILY MEDICINE CLINIC | Age: 60
End: 2020-06-23
Payer: COMMERCIAL

## 2020-06-23 PROCEDURE — G8427 DOCREV CUR MEDS BY ELIG CLIN: HCPCS | Performed by: FAMILY MEDICINE

## 2020-06-23 PROCEDURE — 3017F COLORECTAL CA SCREEN DOC REV: CPT | Performed by: FAMILY MEDICINE

## 2020-06-23 PROCEDURE — 99214 OFFICE O/P EST MOD 30 MIN: CPT | Performed by: FAMILY MEDICINE

## 2020-06-23 RX ORDER — FLUTICASONE PROPIONATE 50 MCG
1 SPRAY, SUSPENSION (ML) NASAL DAILY
Qty: 1 BOTTLE | Refills: 2 | Status: SHIPPED | OUTPATIENT
Start: 2020-06-23 | End: 2021-01-11

## 2020-06-23 RX ORDER — BUPROPION HYDROCHLORIDE 300 MG/1
TABLET ORAL
Qty: 90 TABLET | Refills: 2 | Status: SHIPPED | OUTPATIENT
Start: 2020-06-23 | End: 2022-10-13 | Stop reason: CLARIF

## 2020-06-23 RX ORDER — ERGOCALCIFEROL 1.25 MG/1
CAPSULE ORAL
Qty: 12 CAPSULE | Refills: 2 | Status: SHIPPED | OUTPATIENT
Start: 2020-06-23 | End: 2021-02-04 | Stop reason: SDUPTHER

## 2020-06-23 RX ORDER — GABAPENTIN 300 MG/1
300 CAPSULE ORAL DAILY
Qty: 90 CAPSULE | Refills: 0 | Status: SHIPPED | OUTPATIENT
Start: 2020-06-23 | End: 2020-09-29

## 2020-06-23 RX ORDER — SERTRALINE HYDROCHLORIDE 100 MG/1
TABLET, FILM COATED ORAL
Qty: 90 TABLET | Refills: 2 | Status: SHIPPED | OUTPATIENT
Start: 2020-06-23 | End: 2021-02-04 | Stop reason: SDUPTHER

## 2020-06-23 ASSESSMENT — ENCOUNTER SYMPTOMS
CONSTIPATION: 0
NAUSEA: 0
ABDOMINAL PAIN: 0
ABDOMINAL DISTENTION: 0
CHOKING: 0
BLOOD IN STOOL: 0
RECTAL PAIN: 0
APNEA: 0
CHEST TIGHTNESS: 0
COUGH: 0
STRIDOR: 0
VOMITING: 0
SHORTNESS OF BREATH: 0
WHEEZING: 0
DIARRHEA: 0
ANAL BLEEDING: 0

## 2020-06-23 NOTE — PROGRESS NOTES
general anesthesia     X1    S/P colonoscopy 12/27/2019    Dr. Candelaria Rawls Vitamin D deficiency              Social History     Tobacco Use    Smoking status: Never Smoker    Smokeless tobacco: Never Used   Substance Use Topics    Alcohol use: Yes     Alcohol/week: 0.0 standard drinks     Comment: drinks daily, drinks rum and diet about 1/3 of a bottle daily, occasionally beer    Drug use: No     Social History     Substance and Sexual Activity   Drug Use No           Review of Systems   Constitutional: Negative for activity change, appetite change, chills, diaphoresis, fatigue, fever and unexpected weight change. Eyes: Negative for visual disturbance. Respiratory: Negative for apnea, cough, choking, chest tightness, shortness of breath, wheezing and stridor. Cardiovascular: Negative for chest pain, palpitations and leg swelling. Gastrointestinal: Negative for abdominal distention, abdominal pain, anal bleeding, blood in stool, constipation, diarrhea, nausea, rectal pain and vomiting. Endocrine: Negative for cold intolerance, heat intolerance, polydipsia, polyphagia and polyuria. Genitourinary: Negative for difficulty urinating. Skin: Negative for rash. Hematological: Negative for adenopathy. Psychiatric/Behavioral: Negative for agitation, behavioral problems, confusion, decreased concentration, dysphoric mood, hallucinations, self-injury, sleep disturbance and suicidal ideas. The patient is not nervous/anxious and is not hyperactive. Objective:RR=16   Physical Exam  Constitutional:       Appearance: He is well-developed. He is not toxic-appearing. Comments: Note:exam was conducted with pt' either self-palpating or visually indicating via their device camera. HENT:      Head:      Comments: Nml external ear & nose exams. Mouth/Throat:      Mouth: Mucous membranes are moist.      Pharynx: Oropharynx is clear. Uvula midline.    Eyes:      General: No

## 2020-06-23 NOTE — PATIENT INSTRUCTIONS
concert. Take part in a [employer] activity or other social gathering. Go to a [employer] ([employer]) game. · Ask a friend to have dinner with you. Take care of yourself  · Eat a balanced diet with plenty of fresh fruits and vegetables, whole grains, and lean protein. If you have lost your appetite, eat small snacks rather than large meals. · Avoid drinking alcohol or using illegal drugs. Do not take medicines that have not been prescribed for you. They may interfere with medicines you may be taking for depression, or they may make your depression worse. · Take your medicines exactly as they are prescribed. You may start to feel better within 1 to 3 weeks of taking antidepressant medicine. But it can take as many as 6 to 8 weeks to see more improvement. If you have questions or concerns about your medicines, or if you do not notice any improvement by 3 weeks, talk to your doctor. · If you have any side effects from your medicine, tell your doctor. Antidepressants can make you feel tired, dizzy, or nervous. Some people have dry mouth, constipation, headaches, sexual problems, or diarrhea. Many of these side effects are mild and will go away on their own after you have been taking the medicine for a few weeks. Some may last longer. Talk to your doctor if side effects are bothering you too much. You might be able to try a different medicine. · Get enough sleep. If you have problems sleeping:  ? Go to bed at the same time every night, and get up at the same time every morning. ? Keep your bedroom dark and quiet. ? Do not exercise after 5:00 p.m.  ? Avoid drinks with caffeine after 5:00 p.m. · Avoid sleeping pills unless they are prescribed by the doctor treating your depression. Sleeping pills may make you groggy during the day, and they may interact with other medicine you are taking. · If you have any other illnesses, such as diabetes, heart disease, or high blood pressure, make sure to continue with your treatment.  Tell your

## 2020-07-22 ENCOUNTER — TELEPHONE (OUTPATIENT)
Dept: FAMILY MEDICINE CLINIC | Age: 60
End: 2020-07-22

## 2020-07-22 NOTE — TELEPHONE ENCOUNTER
Called pt to clarify. He states that he had discussed ED issues with Dr. Sarabjit Barrientos during his VV. It was discussed that a generic viagra could be prescribed if cardiology was ok with this. Per pt: he spoke with Dr. Shira Villanueva and he ok'd for him to take this.  Please advise

## 2020-08-10 ENCOUNTER — OFFICE VISIT (OUTPATIENT)
Dept: CARDIOLOGY CLINIC | Age: 60
End: 2020-08-10
Payer: COMMERCIAL

## 2020-08-10 VITALS
HEART RATE: 60 BPM | SYSTOLIC BLOOD PRESSURE: 138 MMHG | WEIGHT: 216 LBS | DIASTOLIC BLOOD PRESSURE: 70 MMHG | TEMPERATURE: 98 F | BODY MASS INDEX: 28.5 KG/M2

## 2020-08-10 PROCEDURE — 3017F COLORECTAL CA SCREEN DOC REV: CPT | Performed by: INTERNAL MEDICINE

## 2020-08-10 PROCEDURE — G8419 CALC BMI OUT NRM PARAM NOF/U: HCPCS | Performed by: INTERNAL MEDICINE

## 2020-08-10 PROCEDURE — 1036F TOBACCO NON-USER: CPT | Performed by: INTERNAL MEDICINE

## 2020-08-10 PROCEDURE — 99214 OFFICE O/P EST MOD 30 MIN: CPT | Performed by: INTERNAL MEDICINE

## 2020-08-10 PROCEDURE — G8427 DOCREV CUR MEDS BY ELIG CLIN: HCPCS | Performed by: INTERNAL MEDICINE

## 2020-08-10 ASSESSMENT — ENCOUNTER SYMPTOMS
COUGH: 0
ABDOMINAL DISTENTION: 0
CHOKING: 0
SHORTNESS OF BREATH: 0
APNEA: 0
CHEST TIGHTNESS: 0

## 2020-08-10 NOTE — PROGRESS NOTES
Subjective:      Patient ID: Ginny Arriola is a 64 y.o. male. Congestive Heart Failure   Pertinent negatives include no chest pain, fatigue, palpitations or shortness of breath. follow up for abnecho/cardiomyopathy/hyperlipid. Has hx of afib and echo ordered. Echo shows LV dysfunction. 2 episodes of afib last episode 2 yrs ago. . Remains stable. No chest pain. No sob. No syncope. No edema. No pnd/orthopnea. Past Medical History:   Diagnosis Date    Allergic rhinitis     Anesthesia complication     INCREASED HEART RATE AFTER PORT PLACEMENT    Arthritis     Atrial fibrillation [facility])     2 episodes:paroxysmal:prior cardiologist.    Depression     under care of psychiatry    Dilated [facility] [address]     Hodgkin's disease ([facility] [address]     2000 hodgkins stage 4:Dr. Diamond Mcgee    Hyperlipidemia     Neuropathy     Prolonged emergence from general anesthesia     X1    S/P colonoscopy 12/27/2019    Dr. Jose Miguel Kirk Vitamin D deficiency      Past Surgical History:   Procedure Laterality Date    COLONOSCOPY  2009    Nml per pt'.     ENDOSCOPY, COLON, DIAGNOSTIC      HERNIA REPAIR      2    KNEE SURGERY Right     OTHER SURGICAL HISTORY Left     thumb surgery    REPLACEMENT SHOULDER TOTAL Left 10/3/2019    LEFT TOTAL SHOULDER ARTHROPLASTY WITH INTERSCALENE BLOCK FOR PAIN CONTROL   [facility] performed by Hodan Collins MD at [address] ARTHROSCOPY      SHOULDER SURGERY Bilateral     rt shoulder LABRUM ROTATOR CUFF, LEFT ARTHROSCOPY    TUNNELED VENOUS PORT PLACEMENT      REMOVED     Social History     Socioeconomic History    Marital status:      Spouse name: Not on file    Number of children: Not on file    Years of education: Not on file    Highest education level: Not on file   Occupational History    Occupation: [employer] Financial resource strain: Not on file    Food insecurity     Worry: Not on file     Inability: Not on file   [employer] needs     Medical: Not on file     Non-medical: Not on file   Tobacco Use    Smoking status: Never Smoker    Smokeless tobacco: Never Used   Substance and Sexual Activity    Alcohol use: Yes     Alcohol/week: 0.0 standard drinks     Comment: drinks daily, drinks rum and diet about 1/3 of a bottle daily, occasionally beer    Drug use: No    Sexual activity: Yes     Comment: - 3 children    Lifestyle    Physical activity     Days per week: Not on file     Minutes per session: Not on file    Stress: Not on file   Relationships    Social connections     Talks on phone: Not on file     Gets together: Not on file     Attends [employer] service: Not on file     Active member of club or organization: Not on file     Attends meetings of clubs or organizations: Not on file     Relationship status: Not on file    Intimate partner violence     Fear of current or ex partner: Not on file     Emotionally abused: Not on file     Physically abused: Not on file     Forced sexual activity: Not on file   Other Topics Concern    Not on file   Social History Narrative    Not on file     FH reviewed,  No FH cardiac issues. Vitals:    08/10/20 1437   BP: 138/70   Pulse: 60   Temp: 98 °F (36.7 °C)       Wt 216        Review of Systems   Constitutional: Negative for activity change and fatigue. Respiratory: Negative for apnea, cough, choking, chest tightness and shortness of breath. Cardiovascular: Negative for chest pain, palpitations and leg swelling. No PND or orthopnea. No tachycardia. Gastrointestinal: Negative for abdominal distention. Musculoskeletal: Negative for myalgias. Neurological: Negative for dizziness, syncope and light-headedness. Psychiatric/Behavioral: Negative for agitation, behavioral problems and confusion. All other systems reviewed and are negative. Objective:   Physical Exam  Constitutional:       General: He is not in acute distress. Appearance: He is well-developed. HENT:      Head: Normocephalic and atraumatic. Eyes:      General:         Right eye: No discharge. Left eye: No discharge. Neck:      Musculoskeletal: Normal range of motion and neck supple. Vascular: No JVD. Cardiovascular:      Rate and Rhythm: Normal rate and regular rhythm. Heart sounds: Normal heart sounds. No gallop. Pulmonary:      Effort: Pulmonary effort is normal. No respiratory distress. Breath sounds: Normal breath sounds. No stridor. No wheezing or rales. Abdominal:      General: Bowel sounds are normal.      Palpations: Abdomen is soft. Tenderness: There is no abdominal tenderness. Musculoskeletal: Normal range of motion. Skin:     General: Skin is warm and dry. Neurological:      Mental Status: He is alert and oriented to person, place, and time. Psychiatric:         Behavior: Behavior normal.         Thought Content: Thought content normal.         Assessment:       Diagnosis Orders   1. Dilated cardiomyopathy ([facility] [address]     2. Hyperlipidemia, unspecified hyperlipidemia type     3. Abnormal echocardiogram           Plan:      CV stable. No chf. No angina. Reviewed previous records and testing including echo 9/19 and cath 10/19. Tolerating toprol. Continue Losartan 25 mg daily. Cough went away. Asked about ED meds. He is clinically stable and would be able to use. No changes. Continue to monitor. Follow up 3 months.          Austin Pierce MD

## 2020-08-12 ENCOUNTER — TELEPHONE (OUTPATIENT)
Dept: FAMILY MEDICINE CLINIC | Age: 60
End: 2020-08-12

## 2020-08-12 NOTE — TELEPHONE ENCOUNTER
Called pt back to clarify. He states with his stomach issues he is not sure if it is anything to do with acid reflux or not. Pt has been having off and on gas pain/nausea. Also, he would like to discuss sleep issues. He states he is unable to sleep. He tries to take OTC tylenol PM to help sleep and ends up sleeping too much. Has tried Trazadone in the past and it worked, but took hours before it kicked in and put him to sleep. Pt went to cardiology on 8/10 and has been approved by cardiology for ED medications. Pt is wanting to be seen in the office for an appt. Next available is 8/25 for in person visit.

## 2020-08-12 NOTE — TELEPHONE ENCOUNTER
Ok to schedule in-person visit. Interim, if develops severe abdo pain then go to local urgent care or ER. Vandana Pickard

## 2020-08-12 NOTE — TELEPHONE ENCOUNTER
Patient is calling needing to be seen for stomach issues and not being able to sleep.  Patient states he is taking OTC meds for both and nothing is working and he would like to be seen in person  pleae advise  Vic Ellis [phone]

## 2020-08-25 ENCOUNTER — OFFICE VISIT (OUTPATIENT)
Dept: FAMILY MEDICINE CLINIC | Age: 60
End: 2020-08-25
Payer: COMMERCIAL

## 2020-08-25 VITALS
DIASTOLIC BLOOD PRESSURE: 84 MMHG | TEMPERATURE: 96.9 F | HEART RATE: 72 BPM | WEIGHT: 216 LBS | OXYGEN SATURATION: 98 % | SYSTOLIC BLOOD PRESSURE: 136 MMHG | RESPIRATION RATE: 16 BRPM | HEIGHT: 73 IN | BODY MASS INDEX: 28.63 KG/M2

## 2020-08-25 PROCEDURE — 1036F TOBACCO NON-USER: CPT | Performed by: FAMILY MEDICINE

## 2020-08-25 PROCEDURE — G8427 DOCREV CUR MEDS BY ELIG CLIN: HCPCS | Performed by: FAMILY MEDICINE

## 2020-08-25 PROCEDURE — 3017F COLORECTAL CA SCREEN DOC REV: CPT | Performed by: FAMILY MEDICINE

## 2020-08-25 PROCEDURE — G8419 CALC BMI OUT NRM PARAM NOF/U: HCPCS | Performed by: FAMILY MEDICINE

## 2020-08-25 PROCEDURE — 99214 OFFICE O/P EST MOD 30 MIN: CPT | Performed by: FAMILY MEDICINE

## 2020-08-25 RX ORDER — TADALAFIL 5 MG/1
5 TABLET ORAL PRN
Qty: 30 TABLET | Refills: 1 | Status: SHIPPED | OUTPATIENT
Start: 2020-08-25 | End: 2020-08-28 | Stop reason: SDUPTHER

## 2020-08-25 RX ORDER — HYDROXYZINE 50 MG/1
50 TABLET, FILM COATED ORAL NIGHTLY PRN
Qty: 30 TABLET | Refills: 0 | Status: SHIPPED | OUTPATIENT
Start: 2020-08-25 | End: 2020-09-24

## 2020-08-25 RX ORDER — PANTOPRAZOLE SODIUM 20 MG/1
20 TABLET, DELAYED RELEASE ORAL DAILY
Qty: 30 TABLET | Refills: 2 | Status: SHIPPED | OUTPATIENT
Start: 2020-08-25 | End: 2022-02-08 | Stop reason: ALTCHOICE

## 2020-08-25 ASSESSMENT — ENCOUNTER SYMPTOMS
DIARRHEA: 0
SHORTNESS OF BREATH: 0
CONSTIPATION: 0
WHEEZING: 0
BLOOD IN STOOL: 0
COUGH: 0
NAUSEA: 0
ANAL BLEEDING: 0
ABDOMINAL PAIN: 0
CHEST TIGHTNESS: 0
VOMITING: 0
APNEA: 0
CHOKING: 0
STRIDOR: 0
ABDOMINAL DISTENTION: 0
RECTAL PAIN: 0

## 2020-08-25 NOTE — PROGRESS NOTES
Subjective:      Patient ID: Oscar Crockett is a 64 y.o. male. HPI    Presenting w/new complaint of GERD x 2weeks described as mild epigastric heart burn. Associated w/mild belching/occasional nausea(none today). Worsened(aggravated) by empty stomach & lying flat. Improves with otc tums short term. .  Caffeine intake:2-3 sodas daily:diet mountain dew. Smoker:no. Prior hx of GI ulcers:no. Denies abdo pain/n-v/appetite decrease or loss/melena/diarrhea/constipation/dysphagia/odynophagia. Erectile Dysfunction: Patient complains of erectile dysfunction. Onset of dysfunction approx 12months but is not worsening. Associated w/nothing. Worsening factors:drinking. Improving factor:none identified or reported. Patient states the nature of difficulty is maintaining erection. Full erections occur :yes but maintaining them is difficult. Full erections occur:yes. Has received clearance from cards on 8/10/20. Denies cp/sob/dizziness/penile pain-discharge/bleeding or lesions. Depression:mild-moderate:feeling well. Has new complaint of moderate insomnia x 2weeks:sleeping approx 4-5hrs. otc melatonin helps short term with 1-2hrs extra sleep. Trouble falling asleep & staying asleep. Associated with nothing else new. Taking wellbutrin/zoloft w/o side effects:med help. No other associated or worsening factors. Denies suicidal or homicidal ideations/new sleep problem/hallucinations/anxiety/nervousness/appetite changes/snoring. Allergies   Allergen Reactions    Levofloxacin Itching     dizzy       Current Outpatient Medications on File Prior to Visit   Medication Sig Dispense Refill    gabapentin (NEURONTIN) 300 MG capsule Take 1 capsule by mouth daily for 90 days.  90 capsule 0    buPROPion (WELLBUTRIN XL) 300 MG extended release tablet TAKE 1 TABLET DAILY 90 tablet 2    sertraline (ZOLOFT) 100 MG tablet TAKE 1 TABLET DAILY 90 tablet 2    vitamin D (ERGOCALCIFEROL) 1.25 MG (78212 UT) CAPS capsule TAKE 1 CAPSULE BY MOUTH ONCE A WEEK 12 capsule 2    fluticasone (FLONASE) 50 MCG/ACT nasal spray 1 spray by Each Nostril route daily 1 Bottle 2    losartan (COZAAR) 25 MG tablet Take 1 tablet by mouth daily 90 tablet 3    metoprolol succinate (TOPROL XL) 25 MG extended release tablet Take 1 tablet by mouth daily 90 tablet 3    atorvastatin (LIPITOR) 40 MG tablet Take 1 tablet by mouth daily 90 tablet 3    aspirin EC 81 MG EC tablet Take 1 tablet by mouth daily 90 tablet 3     No current facility-administered medications on file prior to visit. Past Medical History:   Diagnosis Date    Allergic rhinitis     Anesthesia complication     INCREASED HEART RATE AFTER PORT PLACEMENT    Arthritis     Atrial fibrillation [facility])     2 episodes:paroxysmal:prior cardiologist.    Depression     under care of psychiatry    Dilated cardiomyopathy ([facility] [address]     Hodgkin's disease ([facility] [address]     2000 hodgkins stage 4:Dr. Meli Parsons    Hyperlipidemia     Neuropathy     Prolonged emergence from general anesthesia     X1    S/P colonoscopy 12/27/2019    Dr. Angie Reaves Vitamin D deficiency            Social History     Tobacco Use    Smoking status: Never Smoker    Smokeless tobacco: Never Used   Substance Use Topics    Alcohol use: Yes     Alcohol/week: 0.0 standard drinks     Comment: drinks daily, drinks rum and diet about 1/3 of a bottle daily, occasionally beer    Drug use: No     Social History     Substance and Sexual Activity   Drug Use No           Review of Systems   Constitutional: Negative for activity change, appetite change, chills, diaphoresis, fatigue, fever and unexpected weight change. Respiratory: Negative for apnea, cough, choking, chest tightness, shortness of breath, wheezing and stridor. Cardiovascular: Negative for chest pain, palpitations and leg swelling.    Gastrointestinal: Negative for abdominal distention, abdominal pain, anal bleeding, blood in stool, constipation, diarrhea, nausea, rectal pain and vomiting. Genitourinary: Negative. Negative for decreased urine volume, difficulty urinating, discharge, dysuria, enuresis, flank pain, frequency, genital sores, hematuria, penile pain, penile swelling, scrotal swelling, testicular pain and urgency. Skin: Negative for rash and wound. Neurological: Negative for dizziness and light-headedness. Hematological: Negative for adenopathy. Psychiatric/Behavioral: Positive for sleep disturbance. Negative for behavioral problems, confusion, decreased concentration, dysphoric mood, hallucinations, self-injury and suicidal ideas. The patient is not nervous/anxious and is not hyperactive. Objective:   Physical Exam  Constitutional:       General: He is not in acute distress. Appearance: Normal appearance. He is well-developed. HENT:      Nose: Nose normal.      Mouth/Throat:      Pharynx: Uvula midline. Eyes:      General: Lids are normal.      Conjunctiva/sclera: Conjunctivae normal.      Pupils: Pupils are equal, round, and reactive to light. Neck:      Musculoskeletal: Normal range of motion and neck supple. Trachea: Trachea normal.   Cardiovascular:      Rate and Rhythm: Normal rate and regular rhythm. Pulses: Normal pulses. Heart sounds: Normal heart sounds. Pulmonary:      Effort: Pulmonary effort is normal.      Breath sounds: Normal breath sounds and air entry. Abdominal:      General: Bowel sounds are normal. There is no distension. Palpations: Abdomen is soft. There is no hepatomegaly, splenomegaly or mass. Tenderness: There is no abdominal tenderness. There is no right CVA tenderness, left CVA tenderness, guarding or rebound. Comments: Benign abdo exam.   Lymphadenopathy:      Cervical: No cervical adenopathy. Comments: No supraclavicular LAD. Skin:     General: Skin is warm and dry. Coloration: Skin is not cyanotic. Comments: Good skin turgor. Idania Barreto MD

## 2020-08-25 NOTE — PATIENT INSTRUCTIONS
Patient Education        Gastroesophageal Reflux Disease (GERD): Care Instructions  Your Care Instructions     Gastroesophageal reflux disease (GERD) is the backward flow of stomach acid into the esophagus. The esophagus is the tube that leads from your throat to your stomach. A one-way valve prevents the stomach acid from backing up into this tube. When you have GERD, this valve does not close tightly enough. This can also cause pain and swelling in your esophagus (esophagitis). If you have mild GERD symptoms including heartburn, you may be able to control the problem with antacids or over-the-counter medicine. Changing your diet and eating habits, such as not eating late at night, losing weight, and making other lifestyle changes can also help reduce symptoms. Follow-up care is a key part of your treatment and safety. Be sure to make and go to all appointments, and call your doctor if you are having problems. It's also a good idea to know your test results and keep a list of the medicines you take. How can you care for yourself at home? · Take your medicines exactly as prescribed. Call your doctor if you think you are having a problem with your medicine. · Your doctor may recommend over-the-counter medicine. For mild or occasional indigestion, antacids, such as Tums, Gaviscon, Mylanta, or Maalox, may help. Your doctor also may recommend over-the-counter acid reducers, such as Pepcid AC (famotidine), Tagamet HB (cimetidine), or Prilosec (omeprazole). Read and follow all instructions on the label. If you use these medicines often, talk with your doctor. · Change your eating habits. ? It's best to eat several small meals instead of two or three large meals. ? After you eat, wait 2 to 3 hours before you lie down. ? Chocolate, mint, and alcohol can make GERD worse. ? Spicy foods, foods that have a lot of acid (like tomatoes and oranges), and coffee can make GERD symptoms worse in some people.  If your symptoms are worse after you eat a certain food, you may want to stop eating that food to see if your symptoms get better. · Do not smoke or chew tobacco. Smoking can make GERD worse. If you need help quitting, talk to your doctor about stop-smoking programs and medicines. These can increase your chances of quitting for good. · If you have GERD symptoms at night, raise the head of your bed 6 to 8 inches by putting the frame on blocks or placing a foam wedge under the head of your mattress. (Adding extra pillows does not work.)  · Do not wear tight clothing around your middle. · Lose weight if you need to. Losing just 5 to 10 pounds can help. When should you call for help? Call your doctor now or seek immediate medical care if:  · You have new or different belly pain. · Your stools are black and tarlike or have streaks of blood. Watch closely for changes in your health, and be sure to contact your doctor if:  · Your symptoms have not improved after 2 days. · Food seems to catch in your throat or chest.  Where can you learn more? Go to [email or web address] and sign in to your [employer] account. Enter U412 in the [employer] box to learn more about \"Gastroesophageal Reflux Disease (GERD): Care Instructions. \"     If you do not have an account, please click on the \"Sign Up Now\" link. Current as of: August 12, 2019               Content Version: 12.5  © 8314-3216 [employer]. Care instructions adapted under license by [facility] ([facility]). If you have questions about a medical condition or this instruction, always ask your healthcare professional. [place] [address] any warranty or liability for your use of this information. Patient Education        Insomnia: Care Instructions  Your Care Instructions     Insomnia is the inability to sleep well. It is a common problem for most people at some time.  Insomnia may make it hard for you to get to sleep, stay asleep, or sleep as long as you need to. This can make you tired and grouchy during the day. It can also make you forgetful, less effective at work, and unhappy. Insomnia can be caused by conditions such as depression or anxiety. Pain can also affect your ability to sleep. When these problems are solved, the insomnia usually clears up. But sometimes bad sleep habits can cause insomnia. If insomnia is affecting your work or your enjoyment of life, you can take steps to improve your sleep. Follow-up care is a key part of your treatment and safety. Be sure to make and go to all appointments, and call your doctor if you are having problems. It's also a good idea to know your test results and keep a list of the medicines you take. How can you care for yourself at home? What to avoid   · Do not have drinks with caffeine, such as coffee or black tea, for 8 hours before bed. · Do not smoke or use other types of tobacco near bedtime. Nicotine is a stimulant and can keep you awake. · Avoid drinking alcohol late in the evening, because it can cause you to wake in the middle of the night. · Do not eat a big meal close to bedtime. If you are hungry, eat a light snack. · Do not drink a lot of water close to bedtime, because the need to urinate may wake you up during the night. · Do not read or watch TV in bed. Use the bed only for sleeping and sexual activity. What to try   · Go to bed at the same time every night, and wake up at the same time every morning. Do not take naps during the day. · Keep your bedroom quiet, dark, and cool. · Sleep on a comfortable pillow and mattress. · If watching the clock makes you anxious, turn it facing away from you so you cannot see the time. · If you worry when you lie down, start a worry book. Well before bedtime, write down your worries, and then set the book and your concerns aside. · Try meditation or other relaxation techniques before you go to bed.   · If you cannot fall asleep, get up and go to another room until you feel sleepy. Do something relaxing. Repeat your bedtime routine before you go to bed again. · Make your house quiet and calm about an hour before bedtime. Turn down the lights, turn off the TV, log off the computer, and turn down the volume on music. This can help you relax after a busy day. When should you call for help? Watch closely for changes in your health, and be sure to contact your doctor if:  · Your efforts to improve your sleep do not work. · Your insomnia gets worse. · You have been feeling down, depressed, or hopeless or have lost interest in things that you usually enjoy. Where can you learn more? Go to [email or web address]://Metabolon.[email or web address] org and sign in to your [email or web address] account. Enter P513 in the [employer] box to learn more about \"Insomnia: Care Instructions. \"     If you do not have an account, please click on the \"Sign Up Now\" link. Current as of: December 16, 2019               Content Version: 12.5  © 0186-2510 [employer]. Care instructions adapted under license by [facility] ([facility]). If you have questions about a medical condition or this instruction, always ask your healthcare professional. [address] any warranty or liability for your use of this information.

## 2020-08-28 ENCOUNTER — TELEPHONE (OUTPATIENT)
Dept: FAMILY MEDICINE CLINIC | Age: 60
End: 2020-08-28

## 2020-08-28 RX ORDER — TADALAFIL 2.5 MG/1
TABLET ORAL
Qty: 30 TABLET | Refills: 2 | Status: SHIPPED | OUTPATIENT
Start: 2020-08-28 | End: 2022-04-20 | Stop reason: SDUPTHER

## 2020-08-28 NOTE — TELEPHONE ENCOUNTER
Fax received from [employer] regarding Tadalifil 5mg.  They are requesting for pt to try 2.5 mg.  Please advise

## 2020-09-29 RX ORDER — GABAPENTIN 300 MG/1
CAPSULE ORAL
Qty: 90 CAPSULE | Refills: 1 | Status: SHIPPED | OUTPATIENT
Start: 2020-09-29 | End: 2022-04-20 | Stop reason: SDUPTHER

## 2020-11-11 ENCOUNTER — OFFICE VISIT (OUTPATIENT)
Dept: CARDIOLOGY CLINIC | Age: 60
End: 2020-11-11
Payer: COMMERCIAL

## 2020-11-11 VITALS
DIASTOLIC BLOOD PRESSURE: 70 MMHG | WEIGHT: 224 LBS | SYSTOLIC BLOOD PRESSURE: 132 MMHG | TEMPERATURE: 98.2 F | HEART RATE: 68 BPM | BODY MASS INDEX: 29.55 KG/M2

## 2020-11-11 PROCEDURE — G8428 CUR MEDS NOT DOCUMENT: HCPCS | Performed by: INTERNAL MEDICINE

## 2020-11-11 PROCEDURE — 3017F COLORECTAL CA SCREEN DOC REV: CPT | Performed by: INTERNAL MEDICINE

## 2020-11-11 PROCEDURE — G8419 CALC BMI OUT NRM PARAM NOF/U: HCPCS | Performed by: INTERNAL MEDICINE

## 2020-11-11 PROCEDURE — G8484 FLU IMMUNIZE NO ADMIN: HCPCS | Performed by: INTERNAL MEDICINE

## 2020-11-11 PROCEDURE — 1036F TOBACCO NON-USER: CPT | Performed by: INTERNAL MEDICINE

## 2020-11-11 PROCEDURE — 99214 OFFICE O/P EST MOD 30 MIN: CPT | Performed by: INTERNAL MEDICINE

## 2020-11-11 ASSESSMENT — ENCOUNTER SYMPTOMS
SHORTNESS OF BREATH: 0
ABDOMINAL DISTENTION: 0
CHEST TIGHTNESS: 0
COUGH: 0
CHOKING: 0
APNEA: 0

## 2020-11-11 NOTE — PROGRESS NOTES
Subjective:      Patient ID: Eileen Moore is a 64 y.o. male. Congestive Heart Failure   Pertinent negatives include no chest pain, fatigue, palpitations or shortness of breath. follow up for abnecho/cardiomyopathy/hyperlipid. Has hx of afib and echo ordered. Echo shows LV dysfunction. Remains active. No exertional sx. Remains stable. No chest pain. No sob. Rhythm stable. No syncope. No edema. No pnd/orthopnea. Past Medical History:   Diagnosis Date    Allergic rhinitis     Anesthesia complication     INCREASED HEART RATE AFTER PORT PLACEMENT    Arthritis     Atrial fibrillation [facility])     2 episodes:paroxysmal:prior cardiologist.    Depression     under care of psychiatry    Dilated cardiomyopathy ([facility] [address] [facility]     Hodgkin's disease ([facility] [address] [facility]     2000 hodgkins stage 4:Dr. Sandi Ceron    Hyperlipidemia     Neuropathy     Prolonged emergence from general anesthesia     X1    S/P colonoscopy 12/27/2019    Dr. Eileen Keller Vitamin D deficiency      Past Surgical History:   Procedure Laterality Date    COLONOSCOPY  2009    Nml per pt'.     ENDOSCOPY, COLON, DIAGNOSTIC      HERNIA REPAIR      2    KNEE SURGERY Right     OTHER SURGICAL HISTORY Left     thumb surgery    REPLACEMENT SHOULDER TOTAL Left 10/3/2019    LEFT TOTAL SHOULDER ARTHROPLASTY WITH INTERSCALENE BLOCK FOR PAIN CONTROL   [facility] performed by Leatha Merino MD at [address] [facility] ARTHROSCOPY      SHOULDER SURGERY Bilateral     rt shoulder LABRUM ROTATOR CUFF, LEFT ARTHROSCOPY    TUNNELED VENOUS PORT PLACEMENT      REMOVED     Social History     Socioeconomic History    Marital status:      Spouse name: Not on file    Number of children: Not on file    Years of education: Not on file    Highest education level: Not on file   Occupational History    Occupation: [employer] [address] Financial resource strain: Not on file    Food insecurity     Worry: Not on file     Inability: Not on file   [employer] needs     Medical: Not on file     Non-medical: Not on file   Tobacco Use    Smoking status: Never Smoker    Smokeless tobacco: Never Used   Substance and Sexual Activity    Alcohol use: Yes     Alcohol/week: 0.0 standard drinks     Comment: drinks daily, drinks rum and diet about 1/3 of a bottle daily, occasionally beer    Drug use: No    Sexual activity: Yes     Comment: - 3 children    Lifestyle    Physical activity     Days per week: Not on file     Minutes per session: Not on file    Stress: Not on file   Relationships    Social connections     Talks on phone: Not on file     Gets together: Not on file     Attends Buddhism service: Not on file     Active member of club or organization: Not on file     Attends meetings of clubs or organizations: Not on file     Relationship status: Not on file    Intimate partner violence     Fear of current or ex partner: Not on file     Emotionally abused: Not on file     Physically abused: Not on file     Forced sexual activity: Not on file   Other Topics Concern    Not on file   Social History Narrative    Not on file     FH reviewed,  No FH cardiac issues. Vitals:    11/11/20 1455   BP: 132/70   Pulse: 68   Temp: 98.2 °F (36.8 °C)       Wt 224        Review of Systems   Constitutional: Negative for activity change and fatigue. Respiratory: Negative for apnea, cough, choking, chest tightness and shortness of breath. Cardiovascular: Negative for chest pain, palpitations and leg swelling. No PND or orthopnea. No tachycardia. Gastrointestinal: Negative for abdominal distention. Musculoskeletal: Negative for myalgias. Neurological: Negative for dizziness, syncope and light-headedness. Psychiatric/Behavioral: Negative for agitation, behavioral problems and confusion. All other systems reviewed and are negative. Objective:   Physical Exam  Constitutional:       General: He is not in acute distress. Appearance: He is well-developed. HENT:      Head: Normocephalic and atraumatic. Eyes:      General:         Right eye: No discharge. Left eye: No discharge. Neck:      Musculoskeletal: Normal range of motion and neck supple. Vascular: No JVD. Cardiovascular:      Rate and Rhythm: Normal rate and regular rhythm. Heart sounds: Normal heart sounds. No gallop. Pulmonary:      Effort: Pulmonary effort is normal. No respiratory distress. Breath sounds: Normal breath sounds. No stridor. No wheezing or rales. Abdominal:      General: Bowel sounds are normal.      Palpations: Abdomen is soft. Tenderness: There is no abdominal tenderness. Musculoskeletal: Normal range of motion. Skin:     General: Skin is warm and dry. Neurological:      Mental Status: He is alert and oriented to person, place, and time. Psychiatric:         Behavior: Behavior normal.         Thought Content: Thought content normal.         Assessment:       Diagnosis Orders   1. Dilated cardiomyopathy ([address]     2. Hyperlipidemia, unspecified hyperlipidemia type     3. Abnormal echocardiogram           Plan:      CV stable. Compensated. No angina. Reviewed previous records and testing including echo 9/19 and cath 10/19. Continue Losartan 25 mg daily and toprol  No changes. Continue to monitor. Follow up 3 months.          James Garcia MD

## 2021-01-09 DIAGNOSIS — J30.1 SEASONAL ALLERGIC RHINITIS DUE TO POLLEN: ICD-10-CM

## 2021-01-11 RX ORDER — FLUTICASONE PROPIONATE 50 MCG
SPRAY, SUSPENSION (ML) NASAL
Qty: 16 G | Refills: 5 | Status: SHIPPED | OUTPATIENT
Start: 2021-01-11

## 2021-02-04 DIAGNOSIS — F33.0 MILD EPISODE OF RECURRENT MAJOR DEPRESSIVE DISORDER (HCC): ICD-10-CM

## 2021-02-04 RX ORDER — SERTRALINE HYDROCHLORIDE 100 MG/1
TABLET, FILM COATED ORAL
Qty: 90 TABLET | Refills: 0 | Status: SHIPPED | OUTPATIENT
Start: 2021-02-04 | End: 2021-04-07

## 2021-02-04 RX ORDER — METOPROLOL SUCCINATE 25 MG/1
25 TABLET, EXTENDED RELEASE ORAL DAILY
Qty: 90 TABLET | Refills: 3 | OUTPATIENT
Start: 2021-02-04

## 2021-02-04 RX ORDER — LOSARTAN POTASSIUM 25 MG/1
25 TABLET ORAL DAILY
Qty: 90 TABLET | Refills: 3 | OUTPATIENT
Start: 2021-02-04

## 2021-02-04 RX ORDER — ASPIRIN 81 MG/1
81 TABLET ORAL DAILY
Qty: 90 TABLET | Refills: 3 | OUTPATIENT
Start: 2021-02-04

## 2021-02-04 RX ORDER — ATORVASTATIN CALCIUM 40 MG/1
40 TABLET, FILM COATED ORAL DAILY
Qty: 90 TABLET | Refills: 3 | OUTPATIENT
Start: 2021-02-04

## 2021-02-04 RX ORDER — ERGOCALCIFEROL 1.25 MG/1
CAPSULE ORAL
Qty: 12 CAPSULE | Refills: 0 | Status: SHIPPED | OUTPATIENT
Start: 2021-02-04 | End: 2021-04-29

## 2021-02-04 NOTE — TELEPHONE ENCOUNTER
Pt is in need of his 5 meds refilled to [facility] mail in pharmacy    Last OV:  8-25-20    Please call his wife, Darron Crockett when finished,  [phone]

## 2021-02-16 ENCOUNTER — OFFICE VISIT (OUTPATIENT)
Dept: CARDIOLOGY CLINIC | Age: 61
End: 2021-02-16
Payer: COMMERCIAL

## 2021-02-16 VITALS
DIASTOLIC BLOOD PRESSURE: 100 MMHG | TEMPERATURE: 98 F | WEIGHT: 213 LBS | SYSTOLIC BLOOD PRESSURE: 148 MMHG | BODY MASS INDEX: 28.1 KG/M2 | HEART RATE: 80 BPM

## 2021-02-16 DIAGNOSIS — R93.1 ABNORMAL ECHOCARDIOGRAM: ICD-10-CM

## 2021-02-16 DIAGNOSIS — I42.0 DILATED CARDIOMYOPATHY (HCC): Primary | ICD-10-CM

## 2021-02-16 DIAGNOSIS — I48.0 PAROXYSMAL ATRIAL FIBRILLATION (HCC): ICD-10-CM

## 2021-02-16 DIAGNOSIS — E78.5 HYPERLIPIDEMIA, UNSPECIFIED HYPERLIPIDEMIA TYPE: ICD-10-CM

## 2021-02-16 PROCEDURE — 99214 OFFICE O/P EST MOD 30 MIN: CPT | Performed by: INTERNAL MEDICINE

## 2021-02-16 ASSESSMENT — ENCOUNTER SYMPTOMS
CHEST TIGHTNESS: 0
CHOKING: 0
COUGH: 0
APNEA: 0
ABDOMINAL DISTENTION: 0
SHORTNESS OF BREATH: 0

## 2021-02-16 NOTE — PROGRESS NOTES
Subjective:      Patient ID: Albert Wagoner is a 64 y.o. male. Congestive Heart Failure  Pertinent negatives include no chest pain, fatigue, palpitations or shortness of breath. Follow up for abnecho/cardiomyopathy/hyperlipid/afib. Remains active. No exertional sx. Remains stable. No chest pain. No sob. Rhythm stable. No syncope. No edema. No pnd/orthopnea. Past Medical History:   Diagnosis Date    Allergic rhinitis     Anesthesia complication     INCREASED HEART RATE AFTER PORT PLACEMENT    Arthritis     Atrial fibrillation [facility])     2 episodes:paroxysmal:prior cardiologist.    Depression     under care of psychiatry    Dilated [facility] [address]     Hodgkin's disease ([facility] [address]     2000 hodgkins stage 4:Dr. Alysa Nelson    Hyperlipidemia     Neuropathy     Prolonged emergence from general anesthesia     X1    S/P colonoscopy 12/27/2019    Dr. Jaylin Nielsen Vitamin D deficiency      Past Surgical History:   Procedure Laterality Date    COLONOSCOPY  2009    Nml per pt'.     ENDOSCOPY, COLON, DIAGNOSTIC      HERNIA REPAIR      2    KNEE SURGERY Right     OTHER SURGICAL HISTORY Left     thumb surgery    REPLACEMENT SHOULDER TOTAL Left 10/3/2019    LEFT TOTAL SHOULDER ARTHROPLASTY WITH INTERSCALENE BLOCK FOR PAIN CONTROL   [facility] performed by Nitin Teresa MD at [address] [facility] ARTHROSCOPY      SHOULDER SURGERY Bilateral     rt shoulder LABRUM ROTATOR CUFF, LEFT ARTHROSCOPY    TUNNELED VENOUS PORT PLACEMENT      REMOVED     Social History     Socioeconomic History    Marital status:      Spouse name: Not on file    Number of children: Not on file    Years of education: Not on file    Highest education level: Not on file   Occupational History    Occupation: [employer] Needs    Financial resource strain: Not on file    Food insecurity     Worry: Not on file     Inability: Not on file   [employer] needs     Medical: Not on file     Non-medical: Not on file   Tobacco Use    Smoking status: Never Smoker    Smokeless tobacco: Never Used   Substance and Sexual Activity    Alcohol use: Yes     Alcohol/week: 0.0 standard drinks     Comment: drinks daily, drinks rum and diet about 1/3 of a bottle daily, occasionally beer    Drug use: No    Sexual activity: Yes     Comment: - 3 children    Lifestyle    Physical activity     Days per week: Not on file     Minutes per session: Not on file    Stress: Not on file   Relationships    Social connections     Talks on phone: Not on file     Gets together: Not on file     Attends Buddhism service: Not on file     Active member of club or organization: Not on file     Attends meetings of clubs or organizations: Not on file     Relationship status: Not on file    Intimate partner violence     Fear of current or ex partner: Not on file     Emotionally abused: Not on file     Physically abused: Not on file     Forced sexual activity: Not on file   Other Topics Concern    Not on file   Social History Narrative    Not on file     FH reviewed,  No FH cardiac issues. Vitals:    02/16/21 1357   BP: (!) 148/100   Pulse: 80   Temp: 98 °F (36.7 °C)       Wt 213        Review of Systems   Constitutional: Negative for activity change and fatigue. Respiratory: Negative for apnea, cough, choking, chest tightness and shortness of breath. Cardiovascular: Negative for chest pain, palpitations and leg swelling. No PND or orthopnea. No tachycardia. Gastrointestinal: Negative for abdominal distention. Musculoskeletal: Negative for myalgias. Neurological: Negative for dizziness, syncope and light-headedness. Psychiatric/Behavioral: Negative for agitation, behavioral problems and confusion. All other systems reviewed and are negative. Objective:   Physical Exam  Constitutional:       General: He is not in acute distress. Appearance: He is well-developed.    HENT: Head: Normocephalic and atraumatic. Eyes:      General:         Right eye: No discharge. Left eye: No discharge. Neck:      Musculoskeletal: Normal range of motion and neck supple. Vascular: No JVD. Cardiovascular:      Rate and Rhythm: Normal rate and regular rhythm. Heart sounds: Normal heart sounds. No gallop. Pulmonary:      Effort: Pulmonary effort is normal. No respiratory distress. Breath sounds: Normal breath sounds. No stridor. No wheezing or rales. Abdominal:      General: Bowel sounds are normal.      Palpations: Abdomen is soft. Tenderness: There is no abdominal tenderness. Musculoskeletal: Normal range of motion. Skin:     General: Skin is warm and dry. Neurological:      Mental Status: He is alert and oriented to person, place, and time. Psychiatric:         Behavior: Behavior normal.         Thought Content: Thought content normal.         Assessment:       Diagnosis Orders   1. Dilated cardiomyopathy ([address]     2. Abnormal echocardiogram     3. Hyperlipidemia, unspecified hyperlipidemia type     4. Paroxysmal atrial fibrillation (HCC)             Plan:      CV stable. He gives hx afib yrs ago. Doesn't remember hospital.  Rhythm stable. Compensated. No angina. /90 on recheck. Just ran in from [place]. Check at home. Reviewed previous records and testing including echo 9/19 and cath 10/19. Continue Losartan 25 mg daily and toprol  No changes. Continue to monitor. Follow up 3 months.          Luis Andersen MD

## 2021-03-15 ENCOUNTER — NURSE TRIAGE (OUTPATIENT)
Dept: OTHER | Facility: CLINIC | Age: 61
End: 2021-03-15

## 2021-03-15 NOTE — TELEPHONE ENCOUNTER
Patient called Tommy Wolf at [address] [place] [address] pre-service center [facility])  with red flag complaint. Brief description of triage: Sciatic nerve pain in lower back, down left leg and foot    Triage indicates for patient to see PCP today or tomorrow    Care advice provided, patient verbalizes understanding; denies any other questions or concerns; instructed to call back for any new or worsening symptoms. Writer provided warm transfer to [facility] at [facility] for appointment scheduling. Attention Provider: Thank you for allowing me to participate in the care of your patient. The patient was connected to triage in response to information provided to the [facility]. Please do not respond through this encounter as the response is not directed to a shared pool. Reason for Disposition   Pain radiates into the thigh or further down the leg, and in both legs    Answer Assessment - Initial Assessment Questions  1. ONSET: \"When did the pain begin? \"       2 weeks    2. LOCATION: \"Where does it hurt? \" (upper, mid or lower back)      Lower back    3. SEVERITY: \"How bad is the pain? \"  (e.g., Scale 1-10; mild, moderate, or severe)    - MILD (1-3): doesn't interfere with normal activities     - MODERATE (4-7): interferes with normal activities or awakens from sleep     - SEVERE (8-10): excruciating pain, unable to do any normal activities       8 severe    4. PATTERN: \"Is the pain constant? \" (e.g., yes, no; constant, intermittent)       Constant, but intensity comes and goes    5. RADIATION: \"Does the pain shoot into your legs or elsewhere? \"      Down the left leg to the foot    6. CAUSE:  \"What do you think is causing the back pain? \"       Sciatic nerve    7. BACK OVERUSE:  Majel Hawking recent lifting of heavy objects, strenuous work or exercise? \"      Yes    8. MEDICATIONS: \"What have you taken so far for the pain? \" (e.g., nothing, acetaminophen, NSAIDS)      Ibuprofen    9.  NEUROLOGIC SYMPTOMS: \"Do you have any weakness, numbness, or problems with bowel/bladder control? \"      Yes, sciatic numbness, and from neuropathy from chemo    10. OTHER SYMPTOMS: \"Do you have any other symptoms? \" (e.g., fever, abdominal pain, burning with urination, blood in urine)        Denies    11. PREGNANCY: \"Is there any chance you are pregnant? \" (e.g., yes, no; LMP)        n/a    Protocols used: BACK PAIN-ADULT-[place]

## 2021-03-16 ENCOUNTER — VIRTUAL VISIT (OUTPATIENT)
Dept: FAMILY MEDICINE CLINIC | Age: 61
End: 2021-03-16
Payer: COMMERCIAL

## 2021-03-16 DIAGNOSIS — Z12.5 SCREENING PSA (PROSTATE SPECIFIC ANTIGEN): ICD-10-CM

## 2021-03-16 DIAGNOSIS — M54.42 ACUTE LEFT-SIDED LOW BACK PAIN WITH LEFT-SIDED SCIATICA: Primary | ICD-10-CM

## 2021-03-16 DIAGNOSIS — I10 ESSENTIAL HYPERTENSION: ICD-10-CM

## 2021-03-16 DIAGNOSIS — E78.2 MIXED HYPERLIPIDEMIA: ICD-10-CM

## 2021-03-16 PROCEDURE — 99214 OFFICE O/P EST MOD 30 MIN: CPT | Performed by: FAMILY MEDICINE

## 2021-03-16 RX ORDER — NAPROXEN 500 MG/1
500 TABLET ORAL 2 TIMES DAILY WITH MEALS
Qty: 30 TABLET | Refills: 0 | Status: SHIPPED | OUTPATIENT
Start: 2021-03-16 | End: 2022-04-12

## 2021-03-16 RX ORDER — METHYLPREDNISOLONE 4 MG/1
TABLET ORAL
Qty: 1 KIT | Refills: 0 | Status: SHIPPED | OUTPATIENT
Start: 2021-03-16 | End: 2021-09-09

## 2021-03-16 ASSESSMENT — ENCOUNTER SYMPTOMS
ABDOMINAL DISTENTION: 0
BACK PAIN: 1
NAUSEA: 0
CHEST TIGHTNESS: 0
STRIDOR: 0
DIARRHEA: 0
CHOKING: 0
WHEEZING: 0
ANAL BLEEDING: 0
VOMITING: 0
APNEA: 0
SHORTNESS OF BREATH: 0
RECTAL PAIN: 0
BLOOD IN STOOL: 0
COUGH: 0
CONSTIPATION: 0
ABDOMINAL PAIN: 0

## 2021-03-16 ASSESSMENT — PATIENT HEALTH QUESTIONNAIRE - PHQ9
SUM OF ALL RESPONSES TO PHQ QUESTIONS 1-9: 0
SUM OF ALL RESPONSES TO PHQ9 QUESTIONS 1 & 2: 0
SUM OF ALL RESPONSES TO PHQ QUESTIONS 1-9: 0
1. LITTLE INTEREST OR PLEASURE IN DOING THINGS: 0

## 2021-03-16 NOTE — PROGRESS NOTES
Subjective:      Patient ID: Donnette Boast is a 64 y.o. male. HPI  Patient is  being evaluated by a Virtual Visit (video visit) encounter to address concerns as mentioned above. A caregiver was present when appropriate. Due to this being a TeleHealth encounter (During KKPLO-95 public health emergency), evaluation of the following organ systems was limited: Vitals/Constitutional/EENT/Resp/CV/GI//MS/Neuro/Skin/Heme-Lymph-Imm. Pursuant to the emergency declaration under the [facility], [address] [place] [address] authority and the [employer] Act, this Virtual Visit was conducted with patient's (and/or legal guardian's) consent, to reduce the patient's risk of exposure to COVID-19 and provide necessary medical care. The patient (and/or legal guardian) has also been advised to contact this office for worsening conditions or problems, and seek emergency medical treatment and/or call 911 if deemed necessary. Services were provided through a video synchronous discussion virtually to substitute for in-person clinic visit. Patient and provider were located at their individual homes. \"THIS VISIT WAS COMPLETED VIRTUALLY USING DOXY. ME\"    New problem:  Presenting w/mild to moderate sharp back pain x 14days. Preceding injury:none recalled but has been vacuuming & pressure washing. Associated w/left sided mild intermittent sciatica. Improves w/advil short term. Worsens w/back flexion. Denies BLE vevqtlua-dqgojzwejaa-ivfnpvqum/urinary or bowel control loss or change/saddle anesthesia/gait abnormality.     Allergies   Allergen Reactions    Levofloxacin Itching     dizzy       Current Outpatient Medications on File Prior to Visit   Medication Sig Dispense Refill    vitamin D (ERGOCALCIFEROL) 1.25 MG (68675 UT) CAPS capsule TAKE 1 CAPSULE BY MOUTH ONCE A WEEK 12 capsule 0    sertraline (ZOLOFT) 100 MG tablet TAKE 1 TABLET DAILY 90 tablet 0    fluticasone (FLONASE) 50 MCG/ACT nasal spray USE 1 SPRAY IN EACH NOSTRIL DAILY 16 g 5    gabapentin (NEURONTIN) 300 MG capsule TAKE 1 CAPSULE DAILY 90 capsule 1    tadalafil 2.5 MG TABS Take 1 tab po qd prn erectile dysfunction 30 tablet 2    pantoprazole (PROTONIX) 20 MG tablet Take 1 tablet by mouth daily 30 tablet 2    buPROPion (WELLBUTRIN XL) 300 MG extended release tablet TAKE 1 TABLET DAILY 90 tablet 2    losartan (COZAAR) 25 MG tablet Take 1 tablet by mouth daily 90 tablet 3    metoprolol succinate (TOPROL XL) 25 MG extended release tablet Take 1 tablet by mouth daily 90 tablet 3    atorvastatin (LIPITOR) 40 MG tablet Take 1 tablet by mouth daily 90 tablet 3    aspirin EC 81 MG EC tablet Take 1 tablet by mouth daily 90 tablet 3     No current facility-administered medications on file prior to visit. Past Medical History:   Diagnosis Date    Allergic rhinitis     Anesthesia complication     INCREASED HEART RATE AFTER PORT PLACEMENT    Arthritis     Atrial fibrillation [facility])     2 episodes:paroxysmal:prior cardiologist.    Depression     under care of psychiatry    Dilated cardiomyopathy ([facility] [address]     Hodgkin's disease ([facility] [address]     2000 hodgkins stage 4:Dr. Renda Cogan    Hyperlipidemia     Neuropathy     Prolonged emergence from general anesthesia     X1    S/P colonoscopy 12/27/2019    Dr. Pedro Luis Marquez Vitamin D deficiency            Social History     Tobacco Use    Smoking status: Never Smoker    Smokeless tobacco: Never Used   Substance Use Topics    Alcohol use: Yes     Alcohol/week: 0.0 standard drinks     Comment: drinks daily, drinks rum and diet about 1/3 of a bottle daily, occasionally beer    Drug use: No     Social History     Substance and Sexual Activity   Drug Use No           Review of Systems   Constitutional: Negative for activity change, appetite change, chills, diaphoresis, fatigue, fever and unexpected weight change.    Respiratory: Negative for apnea, cough, choking, chest tightness, shortness of breath, wheezing and stridor. Cardiovascular: Negative for chest pain, palpitations and leg swelling. Gastrointestinal: Negative for abdominal distention, abdominal pain, anal bleeding, blood in stool, constipation, diarrhea, nausea, rectal pain and vomiting. Musculoskeletal: Positive for back pain. Skin: Negative for rash and wound. Neurological: Negative. Hematological: Negative for adenopathy. Objective:   Physical Exam  Constitutional:       Appearance: He is well-developed. He is not toxic-appearing. Comments: Note:exam was conducted with pt' either self-palpating or visually indicating via their device camera. HENT:      Mouth/Throat:      Mouth: Mucous membranes are moist.      Pharynx: Oropharynx is clear. Uvula midline. Eyes:      General: No scleral icterus. Conjunctiva/sclera: Conjunctivae normal.   Neck:      Comments: No neck LAD per self-palpation. Pulmonary:      Effort: Pulmonary effort is normal.      Comments: No audible wheezing/cough/sob. Musculoskeletal:        Back:       Comments: X marks area of pt's back pain:no TTP. Neurological:      Mental Status: He is alert. Psychiatric:         Behavior: Behavior is cooperative. Assessment:       Diagnosis Orders   1. Acute left-sided low back pain with left-sided sciatica  VSS per limited vitals obtainable via virtual visit(VV)/well appearing. Back exercises & strenghtening exercise reviewed/NSAIDs/heat pad. Possible med side effects reviewed. Pt' wishes to proceed w/meds. Xray & PT referral if not improving:p't aware. methylPREDNISolone (MEDROL, ALFREDA,) 4 MG tablet    naproxen (NAPROSYN) 500 MG tablet   2. Essential hypertension  Stable. Comprehensive Metabolic Panel   3. Mixed hyperlipidemia  Labs due. Comprehensive Metabolic Panel    Lipid Panel   4.  Screening PSA (prostate specific antigen)  Risks vs benefits of PSA screening reviewed:pt' wishes to proceed w/testing. PSA screening           Plan:        Pt' ended call in good condition. Call or return to clinic prn if these symptoms worsen or fail to improve as anticipated. Advised to go to local ER or call 911 for any worrisome signs/sx.       Janis Al MD

## 2021-03-16 NOTE — PATIENT INSTRUCTIONS
Patient Education        Sciatica: Exercises  Introduction  Here are some examples of typical rehabilitation exercises for your condition. Start each exercise slowly. Ease off the exercise if you start to have pain. Your doctor or physical therapist will tell you when you can start these exercises and which ones will work best for you. When you are not being active, find a comfortable position for rest. Some people are comfortable on the floor or a medium-firm bed with a small pillow under their head and another under their knees. Some people prefer to lie on their side with a pillow between their knees. Don't stay in one position for too long. Take short walks (10 to 20 minutes) every 2 to 3 hours. Avoid slopes, hills, and stairs until you feel better. Walk only distances you can manage without pain, especially leg pain. How to do the exercises  Back stretches   1. Get down on your hands and knees on the floor. 2. Relax your head and allow it to droop. Round your back up toward the ceiling until you feel a nice stretch in your upper, middle, and lower back. Hold this stretch for as long as it feels comfortable, or about 15 to 30 seconds. 3. Return to the starting position with a flat back while you are on your hands and knees. 4. Let your back sway by pressing your stomach toward the floor. Lift your buttocks toward the ceiling. 5. Hold this position for 15 to 30 seconds. 6. Repeat 2 to 4 times. Follow-up care is a key part of your treatment and safety. Be sure to make and go to all appointments, and call your doctor if you are having problems. It's also a good idea to know your test results and keep a list of the medicines you take. Where can you learn more? Go to https://[employer]. org and sign in to your [employer] account. Enter X228 in the [employer] box to learn more about \"Sciatica: Exercises. \"     If you do not have an account, please click on the \"Sign Up Now\" link.  Current as of: November 16, 2020               Content Version: 12.8  © 6081-0511 [employer]. Care instructions adapted under license by [facility] ([facility]). If you have questions about a medical condition or this instruction, always ask your healthcare professional. [address] any warranty or liability for your use of this information.

## 2021-04-29 DIAGNOSIS — M54.42 ACUTE LEFT-SIDED LOW BACK PAIN WITH LEFT-SIDED SCIATICA: ICD-10-CM

## 2021-04-29 RX ORDER — ERGOCALCIFEROL 1.25 MG/1
CAPSULE ORAL
Qty: 12 CAPSULE | Refills: 0 | Status: SHIPPED | OUTPATIENT
Start: 2021-04-29 | End: 2021-06-10

## 2021-04-29 NOTE — TELEPHONE ENCOUNTER
Requested Prescriptions     Pending Prescriptions Disp Refills    losartan (COZAAR) 25 MG tablet 90 tablet 3     Sig: Take 1 tablet by mouth daily    metoprolol succinate (TOPROL XL) 25 MG extended release tablet 90 tablet 3     Sig: Take 1 tablet by mouth daily    atorvastatin (LIPITOR) 40 MG tablet 90 tablet 3     Sig: Take 1 tablet by mouth daily          Number: 90    Refills: 3    Last Office Visit: 2/16/2021     Next Office Visit: 5/19/2021

## 2021-04-30 RX ORDER — ATORVASTATIN CALCIUM 40 MG/1
40 TABLET, FILM COATED ORAL DAILY
Qty: 90 TABLET | Refills: 3 | Status: SHIPPED | OUTPATIENT
Start: 2021-04-30 | End: 2022-02-28 | Stop reason: ALTCHOICE

## 2021-04-30 RX ORDER — LOSARTAN POTASSIUM 25 MG/1
25 TABLET ORAL DAILY
Qty: 90 TABLET | Refills: 3 | Status: SHIPPED | OUTPATIENT
Start: 2021-04-30 | End: 2022-06-29 | Stop reason: SDUPTHER

## 2021-04-30 RX ORDER — METOPROLOL SUCCINATE 25 MG/1
25 TABLET, EXTENDED RELEASE ORAL DAILY
Qty: 90 TABLET | Refills: 3 | Status: SHIPPED | OUTPATIENT
Start: 2021-04-30 | End: 2022-04-21

## 2021-05-19 ENCOUNTER — OFFICE VISIT (OUTPATIENT)
Dept: CARDIOLOGY CLINIC | Age: 61
End: 2021-05-19
Payer: COMMERCIAL

## 2021-05-19 VITALS
WEIGHT: 208 LBS | HEART RATE: 68 BPM | DIASTOLIC BLOOD PRESSURE: 80 MMHG | BODY MASS INDEX: 27.44 KG/M2 | SYSTOLIC BLOOD PRESSURE: 124 MMHG

## 2021-05-19 DIAGNOSIS — R93.1 ABNORMAL ECHOCARDIOGRAM: ICD-10-CM

## 2021-05-19 DIAGNOSIS — I48.0 PAROXYSMAL ATRIAL FIBRILLATION (HCC): ICD-10-CM

## 2021-05-19 DIAGNOSIS — I42.0 DILATED CARDIOMYOPATHY (HCC): Primary | ICD-10-CM

## 2021-05-19 DIAGNOSIS — E78.5 HYPERLIPIDEMIA, UNSPECIFIED HYPERLIPIDEMIA TYPE: ICD-10-CM

## 2021-05-19 PROCEDURE — 99213 OFFICE O/P EST LOW 20 MIN: CPT | Performed by: INTERNAL MEDICINE

## 2021-05-19 ASSESSMENT — ENCOUNTER SYMPTOMS
ABDOMINAL DISTENTION: 0
CHOKING: 0
CHEST TIGHTNESS: 0
SHORTNESS OF BREATH: 0
COUGH: 0
APNEA: 0

## 2021-05-19 NOTE — PROGRESS NOTES
Subjective:      Patient ID: Tessa Linda is a 64 y.o. male. Congestive Heart Failure  Pertinent negatives include no chest pain, fatigue, palpitations or shortness of breath. Follow up for abnecho/cardiomyopathy/hyperlipid/afib. Remains active. No exertional sx. Remains stable. Phlegm/cough and some what uncomfortable when cough. No sob. Rhythm stable. No syncope. No edema. No pnd/orthopnea. Past Medical History:   Diagnosis Date    Allergic rhinitis     Anesthesia complication     INCREASED HEART RATE AFTER PORT PLACEMENT    Arthritis     Atrial fibrillation [facility])     2 episodes:paroxysmal:prior cardiologist.    Depression     under care of psychiatry    D[facility] [address]     Hodgkin's disease ([facility] [address]     2000 hodgkins stage 4:Dr. Gokul Alcala    Hyperlipidemia     Neuropathy     Prolonged emergence from general anesthesia     X1    S/P colonoscopy 12/27/2019    Dr. Jez Loja Vitamin D deficiency      Past Surgical History:   Procedure Laterality Date    COLONOSCOPY  2009    Nml per pt'.     ENDOSCOPY, COLON, DIAGNOSTIC      HERNIA REPAIR      2    KNEE SURGERY Right     OTHER SURGICAL HISTORY Left     thumb surgery    REPLACEMENT SHOULDER TOTAL Left 10/3/2019    LEFT TOTAL SHOULDER ARTHROPLASTY WITH INTERSCALENE BLOCK FOR PAIN CONTROL   [facility] performed by Brian Powers MD at [address] ARTHROSCOPY      SHOULDER SURGERY Bilateral     rt shoulder LABRUM ROTATOR CUFF, LEFT ARTHROSCOPY    TUNNELED VENOUS PORT PLACEMENT      REMOVED     Social History     Socioeconomic History    Marital status:      Spouse name: Not on file    Number of children: Not on file    Years of education: Not on file    Highest education level: Not on file   Occupational History    Occupation: [employer]   Tobacco Use    Smoking status: Never Smoker    Smokeless tobacco: Never Used   Vaping Use    Vaping Use: Never used   Substance and Sexual Activity    Alcohol use: Yes     Alcohol/week: 0.0 standard drinks     Comment: drinks daily, drinks rum and diet about 1/3 of a bottle daily, occasionally beer    Drug use: No    Sexual activity: Yes     Comment: - 3 children    Other Topics Concern    Not on file   Social History Narrative    Not on file     Social Determinants of Health     Financial Resource Strain:     Difficulty of Paying Living Expenses:    Food Insecurity:     Worried About Running Out of Food in the Last Year:     Ran Out of Food in the Last Year:    Transportation Needs:     Lack of Transportation (Medical):  Lack of Transportation (Non-Medical):    Physical Activity:     Days of Exercise per Week:     Minutes of Exercise per Session:    Stress:     Feeling of Stress :    Social Connections:     Frequency of Communication with Friends and Family:     Frequency of Social Gatherings with Friends and Family:     Attends Orthodox Services:     Active Member of Clubs or Organizations:     Attends Club or Organization Meetings:     Marital Status:    Intimate Partner Violence:     Fear of Current or Ex-Partner:     Emotionally Abused:     Physically Abused:     Sexually Abused:      FH reviewed,  No FH cardiac issues. Vitals:    05/19/21 1309   BP: 124/80   Pulse: 68       Wt 208        Review of Systems   Constitutional: Negative for activity change and fatigue. Respiratory: Negative for apnea, cough, choking, chest tightness and shortness of breath. Cardiovascular: Negative for chest pain, palpitations and leg swelling. No PND or orthopnea. No tachycardia. Gastrointestinal: Negative for abdominal distention. Musculoskeletal: Negative for myalgias. Neurological: Negative for dizziness, syncope and light-headedness. Psychiatric/Behavioral: Negative for agitation, behavioral problems and confusion. All other systems reviewed and are negative.       Objective:   Physical Exam  Constitutional:       General: He is not in acute distress. Appearance: He is well-developed. HENT:      Head: Normocephalic and atraumatic. Eyes:      General:         Right eye: No discharge. Left eye: No discharge. Neck:      Vascular: No JVD. Cardiovascular:      Rate and Rhythm: Normal rate and regular rhythm. Heart sounds: Normal heart sounds. No gallop. Pulmonary:      Effort: Pulmonary effort is normal. No respiratory distress. Breath sounds: Normal breath sounds. No stridor. No wheezing or rales. Abdominal:      General: Bowel sounds are normal.      Palpations: Abdomen is soft. Tenderness: There is no abdominal tenderness. Musculoskeletal:         General: Normal range of motion. Cervical back: Normal range of motion and neck supple. Skin:     General: Skin is warm and dry. Neurological:      Mental Status: He is alert and oriented to person, place, and time. Psychiatric:         Behavior: Behavior normal.         Thought Content: Thought content normal.         Assessment:       Diagnosis Orders   1. Dilated cardiomyopathy ([address]     2. Abnormal echocardiogram     3. Paroxysmal atrial fibrillation (HCC)     4. Hyperlipidemia, unspecified hyperlipidemia type             Plan:      CV stable. He gives hx afib yrs ago. Doesn't remember hospital.  Rhythm stable. Compensated. No angina. /90 on recheck. Just ran in from lot. Check at home. Reviewed previous records and testing including echo 9/19 and cath 10/19. Continue Losartan 25 mg daily and toprol  No changes. Continue to monitor. Follow up 3 months.          Emile Hurst MD

## 2021-06-10 ENCOUNTER — TELEPHONE (OUTPATIENT)
Dept: FAMILY MEDICINE CLINIC | Age: 61
End: 2021-06-10

## 2021-06-10 NOTE — TELEPHONE ENCOUNTER
Called pt. He states his sx are: cough/chest congestion/green and yellow mucus/facial pressure x 3 days.    Please advise  Last OV:  3/16/2021

## 2021-08-30 RX ORDER — ERGOCALCIFEROL 1.25 MG/1
CAPSULE ORAL
Qty: 13 CAPSULE | Refills: 3 | Status: SHIPPED | OUTPATIENT
Start: 2021-08-30 | End: 2022-04-20 | Stop reason: SDUPTHER

## 2021-08-30 NOTE — TELEPHONE ENCOUNTER
Medication:   Requested Prescriptions     Pending Prescriptions Disp Refills    vitamin D (ERGOCALCIFEROL) 1.25 MG (06642 UT) CAPS capsule [Pharmacy Med Name: Vitamin D (Ergocalciferol) 1.25 MG (76779 UT) Oral Capsule] 13 capsule 3     Sig: TAKE 1 CAPSULE BY MOUTH  ONCE WEEKLY       Patient Phone Number: [phone] (home)     Last appt: 3/16/2021   Next appt: Visit date not found    Last OARRS:   RX Monitoring 9/29/2020   Periodic Controlled Substance Monitoring Possible medication side effects, risk of tolerance/dependence & alternative treatments discussed. ;No signs of potential drug abuse or diversion identified.

## 2021-09-02 DIAGNOSIS — F33.0 MILD EPISODE OF RECURRENT MAJOR DEPRESSIVE DISORDER (HCC): ICD-10-CM

## 2021-09-02 RX ORDER — SERTRALINE HYDROCHLORIDE 100 MG/1
TABLET, FILM COATED ORAL
Qty: 90 TABLET | Refills: 3 | Status: SHIPPED | OUTPATIENT
Start: 2021-09-02

## 2021-09-02 NOTE — TELEPHONE ENCOUNTER
Medication:   Requested Prescriptions     Pending Prescriptions Disp Refills    sertraline (ZOLOFT) 100 MG tablet [Pharmacy Med Name: SERTRALINE HCL 100MG TABLET] 90 tablet 3     Sig: TAKE 1 TABLET BY MOUTH  DAILY        Patient Phone Number: [phone] (home)     Last appt: 3/16/2021   Next appt: Visit date not found    Last OARRS:   RX Monitoring 9/29/2020   Periodic Controlled Substance Monitoring Possible medication side effects, risk of tolerance/dependence & alternative treatments discussed. ;No signs of potential drug abuse or diversion identified.

## 2021-09-09 ENCOUNTER — VIRTUAL VISIT (OUTPATIENT)
Dept: FAMILY MEDICINE CLINIC | Age: 61
End: 2021-09-09
Payer: COMMERCIAL

## 2021-09-09 DIAGNOSIS — U07.1 COVID-19: Primary | ICD-10-CM

## 2021-09-09 DIAGNOSIS — R11.10 VOMITING AND DIARRHEA: ICD-10-CM

## 2021-09-09 DIAGNOSIS — R19.7 VOMITING AND DIARRHEA: ICD-10-CM

## 2021-09-09 PROCEDURE — 99213 OFFICE O/P EST LOW 20 MIN: CPT | Performed by: NURSE PRACTITIONER

## 2021-09-09 RX ORDER — ONDANSETRON 4 MG/1
4 TABLET, FILM COATED ORAL DAILY PRN
Qty: 30 TABLET | Refills: 0 | Status: SHIPPED | OUTPATIENT
Start: 2021-09-09 | End: 2022-04-12

## 2021-09-09 RX ORDER — ALBUTEROL SULFATE 2.5 MG/3ML
2.5 SOLUTION RESPIRATORY (INHALATION) EVERY 6 HOURS PRN
Qty: 120 EACH | Refills: 3 | Status: SHIPPED | OUTPATIENT
Start: 2021-09-09 | End: 2022-10-13 | Stop reason: CLARIF

## 2021-09-09 SDOH — ECONOMIC STABILITY: FOOD INSECURITY: WITHIN THE PAST 12 MONTHS, THE FOOD YOU BOUGHT JUST DIDN'T LAST AND YOU DIDN'T HAVE MONEY TO GET MORE.: NEVER TRUE

## 2021-09-09 SDOH — ECONOMIC STABILITY: FOOD INSECURITY: WITHIN THE PAST 12 MONTHS, YOU WORRIED THAT YOUR FOOD WOULD RUN OUT BEFORE YOU GOT MONEY TO BUY MORE.: NEVER TRUE

## 2021-09-09 ASSESSMENT — ENCOUNTER SYMPTOMS
NAUSEA: 1
VOMITING: 1
DIARRHEA: 1
SHORTNESS OF BREATH: 1

## 2021-09-09 ASSESSMENT — SOCIAL DETERMINANTS OF HEALTH (SDOH): HOW HARD IS IT FOR YOU TO PAY FOR THE VERY BASICS LIKE FOOD, HOUSING, MEDICAL CARE, AND HEATING?: NOT HARD AT ALL

## 2021-09-09 NOTE — PROGRESS NOTES
John Sow (:  1960) is a 63 y.o. male,Established patient, here for evaluation of the following chief complaint(s): Post-COVID Symptoms (Postive Covid test on Saturday, no appetite and vomting, headache, denies cough, weak, fatigue, symptoms started 2-3 weeks ago. Fever last week of 99.0 )         ASSESSMENT/PLAN:  1. COVID-19  -     ondansetron (ZOFRAN) 4 MG tablet; Take 1 tablet by mouth daily as needed for Nausea or Vomiting, Disp-30 tablet, R-0Normal  -     albuterol (PROVENTIL) (2.5 MG/3ML) 0.083% nebulizer solution; Take 3 mLs by nebulization every 6 hours as needed for Wheezing, Disp-120 each, R-3Normal  2. Vomiting and diarrhea  -     ondansetron (ZOFRAN) 4 MG tablet; Take 1 tablet by mouth daily as needed for Nausea or Vomiting, Disp-30 tablet, R-0Normal  Conservative measures, pt. Educated on alarm symptoms requiring f/u or ED evaluation including intractable vomiting and diarrhea, SOB, CP, Fever >102F, severe pain or swelling in the face. Pt. Verbalized understanding. Return if symptoms worsen or fail to improve. SUBJECTIVE/OBJECTIVE:  HPI Patient dx with covid19 4 days ago but symptoms have been present for 2 weeks. Has had low grade fevers, anorexia, N/V/D, shortness of breath with activity, myalgias. Has been using an inhaler which does help with this. Has a nebulizer but is out of albuterol. Review of Systems   Constitutional: Positive for activity change, fatigue and fever. Respiratory: Positive for shortness of breath. Gastrointestinal: Positive for diarrhea, nausea and vomiting. Musculoskeletal: Positive for myalgias.        Patient-Reported Vitals 2021   Patient-Reported Weight 208   Patient-Reported Height 6'1   Patient-Reported Systolic 137   Patient-Reported Diastolic 83   Patient-Reported Pulse 95   Patient-Reported Temperature 99.1          [INSTRUCTIONS:  \"[x]\" Indicates a positive item  \"[]\" Indicates a negative item  -- DELETE ALL ITEMS NOT EXAMINED]    Constitutional: [x] Appears well-developed and well-nourished [x] No apparent distress      [] Abnormal -     Mental status: [x] Alert and awake  [x] Oriented to person/place/time [x] Able to follow commands    [] Abnormal -     Eyes:   EOM    [x]  Normal    [] Abnormal -   Sclera  [x]  Normal    [] Abnormal -          Discharge [x]  None visible   [] Abnormal -     HENT: [x] Normocephalic, atraumatic  [] Abnormal -   [x] Mouth/Throat: Mucous membranes are moist    External Ears [x] Normal  [] Abnormal -    Neck: [x] No visualized mass [] Abnormal -     Pulmonary/Chest: [x] Respiratory effort normal   [x] No visualized signs of difficulty breathing or respiratory distress        [] Abnormal -      Musculoskeletal:   [x] Normal gait with no signs of ataxia         [x] Normal range of motion of neck        [] Abnormal -     Neurological:        [x] No Facial Asymmetry (Cranial nerve 7 motor function) (limited exam due to video visit)          [x] No gaze palsy        [] Abnormal -          Skin:        [x] No significant exanthematous lesions or discoloration noted on facial skin         [] Abnormal -            Psychiatric:       [x] Normal Affect [] Abnormal -        [x] No Hallucinations    Other pertinent observable physical exam findings:-          On this date 9/9/2021 I have spent 20 minutes reviewing previous notes, test results and face to face (virtual) with the patient discussing the diagnosis and importance of compliance with the treatment plan as well as documenting on the day of the visit. Vitaliy Thomas, was evaluated through a synchronous (real-time) audio-video encounter. The patient (or guardian if applicable) is aware that this is a billable service. Verbal consent to proceed has been obtained within the past 12 months.  The visit was conducted pursuant to the emergency declaration under the [facility] waiver authority and the Coronavirus

## 2021-09-22 ENCOUNTER — OFFICE VISIT (OUTPATIENT)
Dept: CARDIOLOGY CLINIC | Age: 61
End: 2021-09-22
Payer: COMMERCIAL

## 2021-09-22 VITALS
HEART RATE: 68 BPM | SYSTOLIC BLOOD PRESSURE: 134 MMHG | WEIGHT: 205 LBS | BODY MASS INDEX: 27.05 KG/M2 | DIASTOLIC BLOOD PRESSURE: 70 MMHG

## 2021-09-22 DIAGNOSIS — R93.1 ABNORMAL ECHOCARDIOGRAM: ICD-10-CM

## 2021-09-22 DIAGNOSIS — I42.0 DILATED CARDIOMYOPATHY (HCC): Primary | ICD-10-CM

## 2021-09-22 DIAGNOSIS — I48.0 PAROXYSMAL ATRIAL FIBRILLATION (HCC): ICD-10-CM

## 2021-09-22 DIAGNOSIS — E78.5 HYPERLIPIDEMIA, UNSPECIFIED HYPERLIPIDEMIA TYPE: ICD-10-CM

## 2021-09-22 PROCEDURE — 99214 OFFICE O/P EST MOD 30 MIN: CPT | Performed by: INTERNAL MEDICINE

## 2021-09-22 ASSESSMENT — ENCOUNTER SYMPTOMS
SHORTNESS OF BREATH: 0
CHOKING: 0
CHEST TIGHTNESS: 0
APNEA: 0
ABDOMINAL DISTENTION: 0
COUGH: 0

## 2021-09-22 NOTE — PROGRESS NOTES
Subjective:      Patient ID: Stevie Ramirez is a 63 y.o. male. Congestive Heart Failure  Pertinent negatives include no chest pain, fatigue, palpitations or shortness of breath. Follow up for abnecho/cardiomyopathy/hyperlipid/afib. Remains active. No exertional sx. Remains stable. Phlegm/cough and some what uncomfortable when cough. No sob. Rhythm stable. No syncope. No edema. No pnd/orthopnea. Past Medical History:   Diagnosis Date    Allergic rhinitis     Anesthesia complication     INCREASED HEART RATE AFTER PORT PLACEMENT    Arthritis     Atrial fibrillation [facility])     2 episodes:paroxysmal:prior cardiologist.    Depression     under care of psychiatry    Dilated cardiomyopathy ([facility] [address]     Hodgkin's disease ([facility] [address]     2000 hodgkins stage 4:Dr. Pj Mccall    Hyperlipidemia     Neuropathy     Prolonged emergence from general anesthesia     X1    S/P colonoscopy 12/27/2019    Dr. Robyn Rodrigues Vitamin D deficiency      Past Surgical History:   Procedure Laterality Date    COLONOSCOPY  2009    Nml per pt'.     ENDOSCOPY, COLON, DIAGNOSTIC      HERNIA REPAIR      2    KNEE SURGERY Right     OTHER SURGICAL HISTORY Left     thumb surgery    REPLACEMENT SHOULDER TOTAL Left 10/3/2019    LEFT TOTAL SHOULDER ARTHROPLASTY WITH INTERSCALENE BLOCK FOR PAIN CONTROL   [facility] performed by Melinda Watters MD at [address] ARTHROSCOPY      SHOULDER SURGERY Bilateral     rt shoulder LABRUM ROTATOR CUFF, LEFT ARTHROSCOPY    TUNNELED VENOUS PORT PLACEMENT      REMOVED     Social History     Socioeconomic History    Marital status:      Spouse name: Not on file    Number of children: Not on file    Years of education: Not on file    Highest education level: Not on file   Occupational History    Occupation: [employer]   Tobacco Use    Smoking status: Never Smoker    Smokeless tobacco: Never Used   Vaping Use    Vaping Use: Never used   Substance and Sexual Activity    Alcohol use: Yes     Alcohol/week: 0.0 standard drinks     Comment: drinks daily, drinks rum and diet about 1/3 of a bottle daily, occasionally beer    Drug use: No    Sexual activity: Yes     Comment: - 3 children    Other Topics Concern    Not on file   Social History Narrative    Not on file     Social Determinants of Health     Financial Resource Strain: Low Risk     Difficulty of Paying Living Expenses: Not hard at all   Food Insecurity: No Food Insecurity    Worried About Running Out of Food in the Last Year: Never true    [address] in the Last Year: Never true   Transportation Needs:     Lack of Transportation (Medical):  Lack of Transportation (Non-Medical):    Physical Activity:     Days of Exercise per Week:     Minutes of Exercise per Session:    Stress:     Feeling of Stress :    Social Connections:     Frequency of Communication with Friends and Family:     Frequency of Social Gatherings with Friends and Family:     Attends Orthodox Services:     Active Member of Clubs or Organizations:     Attends Club or Organization Meetings:     Marital Status:    Intimate Partner Violence:     Fear of Current or Ex-Partner:     Emotionally Abused:     Physically Abused:     Sexually Abused:      FH reviewed,  No FH cardiac issues. Vitals:    09/22/21 1256   BP: 134/70   Pulse: 68       Wt 208        Review of Systems   Constitutional: Negative for activity change and fatigue. Respiratory: Negative for apnea, cough, choking, chest tightness and shortness of breath. Cardiovascular: Negative for chest pain, palpitations and leg swelling. No PND or orthopnea. No tachycardia. Gastrointestinal: Negative for abdominal distention. Musculoskeletal: Negative for myalgias. Neurological: Negative for dizziness, syncope and light-headedness. Psychiatric/Behavioral: Negative for agitation, behavioral problems and confusion.    All other systems reviewed and are negative. Objective:   Physical Exam  Constitutional:       General: He is not in acute distress. Appearance: He is well-developed. HENT:      Head: Normocephalic and atraumatic. Eyes:      General:         Right eye: No discharge. Left eye: No discharge. Neck:      Vascular: No JVD. Cardiovascular:      Rate and Rhythm: Normal rate and regular rhythm. Heart sounds: Normal heart sounds. No gallop. Pulmonary:      Effort: Pulmonary effort is normal. No respiratory distress. Breath sounds: Normal breath sounds. No stridor. No wheezing or rales. Abdominal:      General: Bowel sounds are normal.      Palpations: Abdomen is soft. Tenderness: There is no abdominal tenderness. Musculoskeletal:         General: Normal range of motion. Cervical back: Normal range of motion and neck supple. Skin:     General: Skin is warm and dry. Neurological:      Mental Status: He is alert and oriented to person, place, and time. Psychiatric:         Behavior: Behavior normal.         Thought Content: Thought content normal.         Assessment:       Diagnosis Orders   1. Dilated cardiomyopathy ([address]     2. Abnormal echocardiogram     3. Paroxysmal atrial fibrillation (HCC)     4. Hyperlipidemia, unspecified hyperlipidemia type             Plan:      CV stable. He gives hx afib yrs ago. Rhythm stable. Compensated. No angina. BP good here. Reviewed previous records and testing including echo 9/19 and cath 10/19. Will continue Losartan 25 mg daily and toprol  No changes. Continue to monitor. Follow up 3 months.          Ruperto Bronson MD

## 2021-09-28 DIAGNOSIS — G62.9 NEUROPATHY: ICD-10-CM

## 2021-09-28 RX ORDER — GABAPENTIN 300 MG/1
300 CAPSULE ORAL 3 TIMES DAILY
Qty: 270 CAPSULE | Refills: 0 | OUTPATIENT
Start: 2021-09-28 | End: 2021-10-28

## 2021-09-28 NOTE — TELEPHONE ENCOUNTER
----- Message from Jewels Cordero sent at 9/27/2021  4:55 PM EDT -----  Subject: Refill Request    QUESTIONS  Name of Medication? buPROPion (WELLBUTRIN XL) 300 MG extended release   tablet  Patient-reported dosage and instructions? 300 mg; Once a Day  How many days do you have left? 1  Preferred Pharmacy? [address] phone number (if available)? [phone]  Additional Information for Provider? 90 Day Supply  ---------------------------------------------------------------------------  --------------,  Name of Medication? gabapentin (NEURONTIN) 300 MG capsule  Patient-reported dosage and instructions? 300 MG; Three times a day  How many days do you have left? 0  Preferred Pharmacy? [address] phone number (if available)? [phone]  Additional Information for Provider? 90 day supply. Fax [phone] for   [facility]  ---------------------------------------------------------------------------  --------------  CALL BACK INFO  What is the best way for the office to contact you? OK to leave message on   voicemail  Preferred Call Back Phone Number?  [phone]

## 2021-09-28 NOTE — TELEPHONE ENCOUNTER
----- Message from Jewels Cordero sent at 9/27/2021  4:55 PM EDT -----  Subject: Refill Request    QUESTIONS  Name of Medication? buPROPion (WELLBUTRIN XL) 300 MG extended release   tablet  Patient-reported dosage and instructions? 300 mg; Once a Day  How many days do you have left? 1  Preferred Pharmacy? [address] phone number (if available)? [phone]  Additional Information for Provider? 90 Day Supply  ---------------------------------------------------------------------------  --------------,  Name of Medication? gabapentin (NEURONTIN) 300 MG capsule  Patient-reported dosage and instructions? 300 MG; Three times a day  How many days do you have left? 0  Preferred Pharmacy? [address] phone number (if available)? [phone]  Additional Information for Provider? 90 day supply. Fax [phone] for   [facility]  ---------------------------------------------------------------------------  --------------  CALL BACK INFO  What is the best way for the office to contact you? OK to leave message on   voicemail  Preferred Call Back Phone Number?  [phone]

## 2021-09-28 NOTE — TELEPHONE ENCOUNTER
Requested Prescriptions     Pending Prescriptions Disp Refills    lisinopril (PRINIVIL;ZESTRIL) 2.5 MG tablet 30 tablet 3     Sig: Take 1 tablet by mouth daily         Last visit : 9/22/21    Next Visit : 1/4/22    Last fill: 2/24/20

## 2021-09-29 ENCOUNTER — TELEPHONE (OUTPATIENT)
Dept: FAMILY MEDICINE CLINIC | Age: 61
End: 2021-09-29

## 2021-09-29 RX ORDER — LISINOPRIL 2.5 MG/1
2.5 TABLET ORAL DAILY
Qty: 30 TABLET | Refills: 3 | Status: SHIPPED | OUTPATIENT
Start: 2021-09-29 | End: 2022-01-03

## 2021-09-29 NOTE — TELEPHONE ENCOUNTER
Pt wife called in, I gave info that pt need an appoint before prescription can be filled. Pt wife informed that pt just got over covid and can not come in at moment pt is completely out and is suicidal without it.  If can just prescribe a weeks worth to hold pt over

## 2021-12-15 ENCOUNTER — TELEPHONE (OUTPATIENT)
Dept: FAMILY MEDICINE CLINIC | Age: 61
End: 2021-12-15

## 2021-12-15 NOTE — TELEPHONE ENCOUNTER
----- Message from Sreekanth Pham sent at 12/15/2021 11:40 AM EST -----  Subject: Message to Provider    QUESTIONS  Information for Provider? Patient has a sinus infection x2 days. Patient   is requesting that an antibiotic or a z pack be called in if possible.  ---------------------------------------------------------------------------  --------------  CALL BACK INFO  What is the best way for the office to contact you? OK to leave message on   voicemail, OK to respond with electronic message via [employer] portal (only   for patients who have registered [employer] account)  Preferred Call Back Phone Number? [phone]  ---------------------------------------------------------------------------  --------------  SCRIPT ANSWERS  Relationship to Patient?  Self

## 2021-12-30 ENCOUNTER — TELEPHONE (OUTPATIENT)
Dept: FAMILY MEDICINE CLINIC | Age: 61
End: 2021-12-30

## 2021-12-30 DIAGNOSIS — R05.9 COUGH: Primary | ICD-10-CM

## 2021-12-30 RX ORDER — BENZONATATE 200 MG/1
200 CAPSULE ORAL 3 TIMES DAILY PRN
Qty: 21 CAPSULE | Refills: 0 | Status: SHIPPED | OUTPATIENT
Start: 2021-12-30 | End: 2022-01-06

## 2021-12-30 NOTE — TELEPHONE ENCOUNTER
----- Message from [facility] sent at 12/29/2021  3:52 PM EST -----  Subject: Message to Provider    QUESTIONS  Information for Provider? Kishore Land has had a cough for about 2 weeks and   would for a prescription for a cough medication to be called into [facility] on [address]  The over the counter cough medicines are not   working. He had a COVID test done at [facility] on [address] and it was negative.   ---------------------------------------------------------------------------  --------------  CALL BACK INFO  What is the best way for the office to contact you? OK to leave message on   voicemail  Preferred Call Back Phone Number? [phone]  ---------------------------------------------------------------------------  --------------  SCRIPT ANSWERS  Relationship to Patient?  Self

## 2022-01-03 RX ORDER — LISINOPRIL 2.5 MG/1
2.5 TABLET ORAL DAILY
Qty: 30 TABLET | Refills: 5 | Status: SHIPPED | OUTPATIENT
Start: 2022-01-03 | End: 2022-01-31

## 2022-01-18 ENCOUNTER — OFFICE VISIT (OUTPATIENT)
Dept: CARDIOLOGY CLINIC | Age: 62
End: 2022-01-18
Payer: MEDICARE

## 2022-01-18 VITALS
HEART RATE: 72 BPM | BODY MASS INDEX: 29.03 KG/M2 | SYSTOLIC BLOOD PRESSURE: 124 MMHG | DIASTOLIC BLOOD PRESSURE: 70 MMHG | WEIGHT: 220 LBS

## 2022-01-18 DIAGNOSIS — I48.0 PAROXYSMAL ATRIAL FIBRILLATION (HCC): ICD-10-CM

## 2022-01-18 DIAGNOSIS — E78.5 HYPERLIPIDEMIA, UNSPECIFIED HYPERLIPIDEMIA TYPE: ICD-10-CM

## 2022-01-18 DIAGNOSIS — R93.1 ABNORMAL ECHOCARDIOGRAM: ICD-10-CM

## 2022-01-18 DIAGNOSIS — I42.0 DILATED CARDIOMYOPATHY (HCC): ICD-10-CM

## 2022-01-18 DIAGNOSIS — I50.22 CHRONIC SYSTOLIC CONGESTIVE HEART FAILURE (HCC): Primary | ICD-10-CM

## 2022-01-18 PROCEDURE — G8419 CALC BMI OUT NRM PARAM NOF/U: HCPCS | Performed by: INTERNAL MEDICINE

## 2022-01-18 PROCEDURE — 1036F TOBACCO NON-USER: CPT | Performed by: INTERNAL MEDICINE

## 2022-01-18 PROCEDURE — G8484 FLU IMMUNIZE NO ADMIN: HCPCS | Performed by: INTERNAL MEDICINE

## 2022-01-18 PROCEDURE — G8427 DOCREV CUR MEDS BY ELIG CLIN: HCPCS | Performed by: INTERNAL MEDICINE

## 2022-01-18 PROCEDURE — 3017F COLORECTAL CA SCREEN DOC REV: CPT | Performed by: INTERNAL MEDICINE

## 2022-01-18 PROCEDURE — 99214 OFFICE O/P EST MOD 30 MIN: CPT | Performed by: INTERNAL MEDICINE

## 2022-01-18 ASSESSMENT — ENCOUNTER SYMPTOMS
CHOKING: 0
APNEA: 0
SHORTNESS OF BREATH: 0
ABDOMINAL DISTENTION: 0
COUGH: 0
CHEST TIGHTNESS: 0

## 2022-01-18 NOTE — PROGRESS NOTES
Vaping Use: Never used   Substance and Sexual Activity    Alcohol use: Yes     Alcohol/week: 0.0 standard drinks     Comment: drinks daily, drinks rum and diet about 1/3 of a bottle daily, occasionally beer    Drug use: No    Sexual activity: Yes     Comment: - 3 children    Other Topics Concern    Not on file   Social History Narrative    Not on file     Social Determinants of Health     Financial Resource Strain: Low Risk     Difficulty of Paying Living Expenses: Not hard at all   Food Insecurity: No Food Insecurity    Worried About Running Out of Food in the Last Year: Never true    [address] in the Last Year: Never true   Transportation Needs:     Lack of Transportation (Medical): Not on file    Lack of Transportation (Non-Medical): Not on file   Physical Activity:     Days of Exercise per Week: Not on file    Minutes of Exercise per Session: Not on file   Stress:     Feeling of Stress : Not on file   Social Connections:     Frequency of Communication with Friends and Family: Not on file    Frequency of Social Gatherings with Friends and Family: Not on file    Attends Mor[employer] Services: Not on file    Active Member of [address] [place] [address] or Organizations: Not on file    Attends Club or Organization Meetings: Not on file    Marital Status: Not on file   Intimate Partner Violence:     Fear of Current or Ex-Partner: Not on file    Emotionally Abused: Not on file    Physically Abused: Not on file    Sexually Abused: Not on file   Housing Stability:     Unable to Pay for Housing in the Last Year: Not on file    Number of [place] in the Last Year: Not on file    Unstable Housing in the Last Year: Not on file     FH reviewed,  No FH cardiac issues. Vitals:    01/18/22 0901   BP: 124/70   Pulse: 72       Wt 220        Review of Systems   Constitutional: Negative for activity change and fatigue. Respiratory: Negative for apnea, cough, choking, chest tightness and shortness of breath. Cardiovascular: Negative for chest pain, palpitations and leg swelling. No PND or orthopnea. No tachycardia. Gastrointestinal: Negative for abdominal distention. Musculoskeletal: Negative for myalgias. Neurological: Negative for dizziness, syncope and light-headedness. Psychiatric/Behavioral: Negative for agitation, behavioral problems and confusion. All other systems reviewed and are negative. Objective:   Physical Exam  Constitutional:       General: He is not in acute distress. Appearance: He is well-developed. HENT:      Head: Normocephalic and atraumatic. Eyes:      General:         Right eye: No discharge. Left eye: No discharge. Neck:      Vascular: No JVD. Cardiovascular:      Rate and Rhythm: Normal rate and regular rhythm. Heart sounds: Normal heart sounds. No gallop. Pulmonary:      Effort: Pulmonary effort is normal. No respiratory distress. Breath sounds: Normal breath sounds. No stridor. No wheezing or rales. Abdominal:      General: Bowel sounds are normal.      Palpations: Abdomen is soft. Tenderness: There is no abdominal tenderness. Musculoskeletal:         General: Normal range of motion. Cervical back: Normal range of motion and neck supple. Skin:     General: Skin is warm and dry. Neurological:      Mental Status: He is alert and oriented to person, place, and time. Psychiatric:         Behavior: Behavior normal.         Thought Content: Thought content normal.         Assessment:       Diagnosis Orders   1. Chronic systolic congestive heart failure ([address]     2. Dilated cardiomyopathy (HCC)     3. Paroxysmal atrial fibrillation ([address]     4. Abnormal echocardiogram     5. Hyperlipidemia, unspecified hyperlipidemia type             Plan:      CV stable. He gives hx afib yrs ago. Rhythm stable. Compensated. No angina. BP good here. Reviewed previous records and testing including echo 9/19 and cath 10/19.   Will continue Losartan 25 mg daily and toprol 25 mg qd for CHF/cmyop/afib. No changes. Continue to monitor. Follow up 3 months.          Wero Nair MD

## 2022-01-31 ENCOUNTER — OFFICE VISIT (OUTPATIENT)
Dept: FAMILY MEDICINE CLINIC | Age: 62
End: 2022-01-31
Payer: MEDICARE

## 2022-01-31 VITALS
HEIGHT: 73 IN | WEIGHT: 219.2 LBS | SYSTOLIC BLOOD PRESSURE: 128 MMHG | OXYGEN SATURATION: 98 % | DIASTOLIC BLOOD PRESSURE: 80 MMHG | TEMPERATURE: 97.7 F | HEART RATE: 70 BPM | BODY MASS INDEX: 29.05 KG/M2

## 2022-01-31 DIAGNOSIS — Z00.00 HEALTHCARE MAINTENANCE: ICD-10-CM

## 2022-01-31 DIAGNOSIS — R05.3 CHRONIC COUGH: ICD-10-CM

## 2022-01-31 DIAGNOSIS — Z76.89 ENCOUNTER TO ESTABLISH CARE: Primary | ICD-10-CM

## 2022-01-31 DIAGNOSIS — Z23 NEED FOR IMMUNIZATION AGAINST INFLUENZA: ICD-10-CM

## 2022-01-31 PROBLEM — T42.72XA: Status: ACTIVE | Noted: 2022-01-31

## 2022-01-31 PROBLEM — F32.2 SEVERE SINGLE CURRENT EPISODE OF MAJOR DEPRESSIVE DISORDER, WITHOUT PSYCHOTIC FEATURES (HCC): Status: ACTIVE | Noted: 2022-01-31

## 2022-01-31 PROBLEM — J02.9 VIRAL PHARYNGITIS: Status: RESOLVED | Noted: 2019-11-27 | Resolved: 2022-01-31

## 2022-01-31 PROBLEM — I50.32 DIASTOLIC CHF, CHRONIC (HCC): Status: ACTIVE | Noted: 2019-09-17

## 2022-01-31 PROCEDURE — 90674 CCIIV4 VAC NO PRSV 0.5 ML IM: CPT | Performed by: FAMILY MEDICINE

## 2022-01-31 PROCEDURE — G0008 ADMIN INFLUENZA VIRUS VAC: HCPCS | Performed by: FAMILY MEDICINE

## 2022-01-31 PROCEDURE — G8419 CALC BMI OUT NRM PARAM NOF/U: HCPCS | Performed by: FAMILY MEDICINE

## 2022-01-31 PROCEDURE — 1036F TOBACCO NON-USER: CPT | Performed by: FAMILY MEDICINE

## 2022-01-31 PROCEDURE — G8482 FLU IMMUNIZE ORDER/ADMIN: HCPCS | Performed by: FAMILY MEDICINE

## 2022-01-31 PROCEDURE — G8427 DOCREV CUR MEDS BY ELIG CLIN: HCPCS | Performed by: FAMILY MEDICINE

## 2022-01-31 PROCEDURE — 99204 OFFICE O/P NEW MOD 45 MIN: CPT | Performed by: FAMILY MEDICINE

## 2022-01-31 PROCEDURE — 3017F COLORECTAL CA SCREEN DOC REV: CPT | Performed by: FAMILY MEDICINE

## 2022-01-31 RX ORDER — DOXYCYCLINE HYCLATE 100 MG/1
CAPSULE ORAL
COMMUNITY
Start: 2022-01-26 | End: 2022-02-08 | Stop reason: ALTCHOICE

## 2022-01-31 RX ORDER — PREDNISONE 20 MG/1
TABLET ORAL
COMMUNITY
Start: 2022-01-26 | End: 2022-02-08 | Stop reason: ALTCHOICE

## 2022-01-31 SDOH — SOCIAL STABILITY: SOCIAL NETWORK
IN A TYPICAL WEEK, HOW MANY TIMES DO YOU TALK ON THE PHONE WITH FAMILY, FRIENDS, OR NEIGHBORS?: MORE THAN THREE TIMES A WEEK

## 2022-01-31 SDOH — SOCIAL STABILITY: SOCIAL NETWORK: HOW OFTEN DO YOU ATTENT MEETINGS OF THE CLUB OR ORGANIZATION YOU BELONG TO?: MORE THAN 4 TIMES PER YEAR

## 2022-01-31 SDOH — ECONOMIC STABILITY: INCOME INSECURITY: HOW HARD IS IT FOR YOU TO PAY FOR THE VERY BASICS LIKE FOOD, HOUSING, MEDICAL CARE, AND HEATING?: NOT HARD AT ALL

## 2022-01-31 SDOH — HEALTH STABILITY: PHYSICAL HEALTH: ON AVERAGE, HOW MANY MINUTES DO YOU ENGAGE IN EXERCISE AT THIS LEVEL?: 0 MIN

## 2022-01-31 SDOH — ECONOMIC STABILITY: INCOME INSECURITY: IN THE LAST 12 MONTHS, WAS THERE A TIME WHEN YOU WERE NOT ABLE TO PAY THE MORTGAGE OR RENT ON TIME?: NO

## 2022-01-31 SDOH — HEALTH STABILITY: MENTAL HEALTH: HOW OFTEN DO YOU HAVE A DRINK CONTAINING ALCOHOL?: 2-3 TIMES A WEEK

## 2022-01-31 SDOH — ECONOMIC STABILITY: FOOD INSECURITY: WITHIN THE PAST 12 MONTHS, YOU WORRIED THAT YOUR FOOD WOULD RUN OUT BEFORE YOU GOT MONEY TO BUY MORE.: NEVER TRUE

## 2022-01-31 SDOH — HEALTH STABILITY: MENTAL HEALTH
STRESS IS WHEN SOMEONE FEELS TENSE, NERVOUS, ANXIOUS, OR CAN'T SLEEP AT NIGHT BECAUSE THEIR MIND IS TROUBLED. HOW STRESSED ARE YOU?: NOT AT ALL

## 2022-01-31 SDOH — ECONOMIC STABILITY: HOUSING INSECURITY: IN THE LAST 12 MONTHS, HOW MANY PLACES HAVE YOU LIVED?: 1

## 2022-01-31 SDOH — ECONOMIC STABILITY: TRANSPORTATION INSECURITY
IN THE PAST 12 MONTHS, HAS THE LACK OF TRANSPORTATION KEPT YOU FROM MEDICAL APPOINTMENTS OR FROM GETTING MEDICATIONS?: NO

## 2022-01-31 SDOH — ECONOMIC STABILITY: FOOD INSECURITY: WITHIN THE PAST 12 MONTHS, THE FOOD YOU BOUGHT JUST DIDN'T LAST AND YOU DIDN'T HAVE MONEY TO GET MORE.: NEVER TRUE

## 2022-01-31 SDOH — HEALTH STABILITY: MENTAL HEALTH: HOW MANY STANDARD DRINKS CONTAINING ALCOHOL DO YOU HAVE ON A TYPICAL DAY?: 3 OR 4

## 2022-01-31 SDOH — ECONOMIC STABILITY: HOUSING INSECURITY
IN THE LAST 12 MONTHS, WAS THERE A TIME WHEN YOU DID NOT HAVE A STEADY PLACE TO SLEEP OR SLEPT IN A SHELTER (INCLUDING NOW)?: NO

## 2022-01-31 SDOH — SOCIAL STABILITY: SOCIAL NETWORK: ARE YOU MARRIED, WIDOWED, DIVORCED, SEPARATED, NEVER MARRIED, OR LIVING WITH A PARTNER?: MARRIED

## 2022-01-31 SDOH — ECONOMIC STABILITY: TRANSPORTATION INSECURITY
IN THE PAST 12 MONTHS, HAS LACK OF TRANSPORTATION KEPT YOU FROM MEETINGS, WORK, OR FROM GETTING THINGS NEEDED FOR DAILY LIVING?: NO

## 2022-01-31 SDOH — HEALTH STABILITY: PHYSICAL HEALTH: ON AVERAGE, HOW MANY DAYS PER WEEK DO YOU ENGAGE IN MODERATE TO STRENUOUS EXERCISE (LIKE A BRISK WALK)?: 0 DAYS

## 2022-01-31 SDOH — SOCIAL STABILITY: SOCIAL NETWORK: HOW OFTEN DO YOU ATTEND CHURCH OR RELIGIOUS SERVICES?: MORE THAN 4 TIMES PER YEAR

## 2022-01-31 SDOH — SOCIAL STABILITY: SOCIAL NETWORK: HOW OFTEN DO YOU GET TOGETHER WITH FRIENDS OR RELATIVES?: THREE TIMES A WEEK

## 2022-01-31 SDOH — SOCIAL STABILITY: SOCIAL NETWORK
DO YOU BELONG TO ANY CLUBS OR ORGANIZATIONS SUCH AS CHURCH GROUPS UNIONS, FRATERNAL OR ATHLETIC GROUPS, OR SCHOOL GROUPS?: YES

## 2022-01-31 NOTE — PROGRESS NOTES
Vaccine Information Sheet, \"Influenza - Inactivated\"  given to Ryder Duran, or parent/legal guardian of  Ryder Duran and verbalized understanding. Patient responses:    Have you ever had a reaction to a flu vaccine? No  Are you able to eat eggs without adverse effects? Yes  Do you have any current illness? No  Have you ever had [place] Syndrome? No    Flu vaccine given per order. Please see immunization tab.

## 2022-01-31 NOTE — PROGRESS NOTES
Chief Complaint   Patient presents with   Marisol Brown New Doctor    Cough     persisitent       SUBJECTIVE:   Kaylyn Aranda is a 63 y.o. male presenting to [facility] care. His PCP was hospitalized and then left the practice. He doesn't know what happened to him and it bothers him. HPI:   Has had 'cold' for the last 5weeks. He went to urgent care and was given zpack at first; the cough wasn't getting better so he was put on steroids and another antibiotic and inhaler. He gets coughing spells for 5min.  He hasn't had one today; 1 pill of prednisone and 4 or 5 left of doxycycline    Cardiology for cardiomyopathy/afib/HTN; losartan and metoprolol; due for q3mos f/u    Hx of hodgkin's lymphoma (nodular sclerosis, of lymph nodes of head, face, and neck) in 2001- Dr. Rocael Rios; on gabapentin for neuropathy    c-sope in 2019 with diverticulosis and 1 sigmoid polyp; ?due in 10yrs    SH -  x30+ years; declines HIV, HCV screening    Patient Active Problem List   Diagnosis    Cardiomyopathy ([address]    Shoulder arthritis    Status post total shoulder replacement, left    Coronary atherosclerosis    Diastolic CHF, chronic ([address]    Hodgkin's disease, nodular sclerosis, of lymph nodes of head, face, and neck (HCC)    Hyperlipidemia    Left bundle branch block (LBBB) on electrocardiogram    Paroxysmal atrial fibrillation ([address]    Severe single current episode of major depressive disorder, without psychotic features ([address]    Suicide attempt ([address]    Sleeping drug overdose, intentional self-harm, initial encounter    Abnormal thyroid function test    Abnormal stress electrocardiogram test     Past Medical History:   Diagnosis Date    Allergic rhinitis     Anesthesia complication     INCREASED HEART RATE AFTER PORT PLACEMENT    Arthritis     Atrial fibrillation [facility])     2 episodes:paroxysmal:prior cardiologist.    Coronary atherosclerosis 6/24/2015    Depression     under care of psychiatry    [facility]     Hodgkin's disease ([facility]     2000 hodgkins stage 4:Dr. Cori Galdamez    Hyperlipidemia     Neuropathy     Prolonged emergence from general anesthesia     X1    S/P colonoscopy 12/27/2019    Dr. Rubi Del Castillo Testicular pain, left 8/21/2012    Viral pharyngitis 11/27/2019    Vitamin D deficiency      Past Surgical History:   Procedure Laterality Date    COLONOSCOPY  2009    Nml per pt'.  ENDOSCOPY, COLON, DIAGNOSTIC      HERNIA REPAIR      2    KNEE SURGERY Right     OTHER SURGICAL HISTORY Left     thumb surgery    REPLACEMENT SHOULDER TOTAL Left 10/3/2019    LEFT TOTAL SHOULDER ARTHROPLASTY WITH INTERSCALENE BLOCK FOR PAIN CONTROL   [facility] performed by Sherlyn Saucedo MD at [address] [facility] ARTHROSCOPY      SHOULDER SURGERY Bilateral     rt shoulder LABRUM ROTATOR CUFF, LEFT ARTHROSCOPY    TUNNELED VENOUS PORT PLACEMENT      REMOVED     Social History     Socioeconomic History    Marital status:      Spouse name: None    Number of children: None    Years of education: None    Highest education level: None   Occupational History    Occupation: [employer]   Tobacco Use    Smoking status: Never Smoker    Smokeless tobacco: Never Used   Vaping Use    Vaping Use: Never used   Substance and Sexual Activity    Alcohol use:  Yes     Alcohol/week: 0.0 standard drinks     Comment: drinks daily, drinks rum and diet about 1/3 of a bottle daily, occasionally beer    Drug use: No    Sexual activity: Yes     Comment: - 3 children    Other Topics Concern    None   Social History Narrative    None     Social Determinants of Health     Financial Resource Strain: Low Risk     Difficulty of Paying Living Expenses: Not hard at all   Food Insecurity: No Food Insecurity    Worried About Running Out of Food in the Last Year: Never true    Yo of Food in the Last Year: Never true   Transportation Needs: No Transportation Needs    Lack of Transportation (Medical): No    Lack of Transportation (Non-Medical): No   Physical Activity: Inactive    Days of Exercise per Week: 0 days    Minutes of Exercise per Session: 0 min   Stress: No Stress Concern Present    Feeling of Stress : Not at all   Social Connections: Socially Integrated    Frequency of Communication with Friends and Family: More than three times a week    Frequency of Social Gatherings with Friends and Family: Three times a week    Attends Jainism Services: More than 4 times per year    Active Member of Clubs or Organizations:  Yes    Attends Club or Organization Meetings: More than 4 times per year    Marital Status:    Intimate Partner Violence:     Fear of Current or Ex-Partner: Not on file    Emotionally Abused: Not on file    Physically Abused: Not on file    Sexually Abused: Not on file   Housing Stability: [address] Unable to Pay for Housing in the Last Year: No    Number of [place] in the Last Year: 1    Unstable Housing in the Last Year: No     Family History   Problem Relation Age of Onset    No Known Problems Mother     No Known Problems Father      Current Outpatient Medications   Medication Sig Dispense Refill    doxycycline hyclate (VIBRAMYCIN) 100 MG capsule TAKE 1 CAPSULE BY MOUTH TWICE DAILY      predniSONE (DELTASONE) 20 MG tablet TAKE 1 TABLET BY MOUTH TWICE DAILY WITH FOOD      ondansetron (ZOFRAN) 4 MG tablet Take 1 tablet by mouth daily as needed for Nausea or Vomiting 30 tablet 0    albuterol (PROVENTIL) (2.5 MG/3ML) 0.083% nebulizer solution Take 3 mLs by nebulization every 6 hours as needed for Wheezing 120 each 3    sertraline (ZOLOFT) 100 MG tablet TAKE 1 TABLET BY MOUTH  DAILY 90 tablet 3    vitamin D (ERGOCALCIFEROL) 1.25 MG (61796 UT) CAPS capsule TAKE 1 CAPSULE BY MOUTH  ONCE WEEKLY 13 capsule 3    losartan (COZAAR) 25 MG tablet Take 1 tablet by mouth daily 90 tablet 3    metoprolol succinate (TOPROL XL) 25 MG extended release tablet Take 1 tablet by mouth daily 90 tablet 3    atorvastatin (LIPITOR) 40 MG tablet Take 1 tablet by mouth daily 90 tablet 3    naproxen (NAPROSYN) 500 MG tablet Take 1 tablet by mouth 2 times daily (with meals) 30 tablet 0    fluticasone (FLONASE) 50 MCG/ACT nasal spray USE 1 SPRAY IN EACH NOSTRIL DAILY 16 g 5    gabapentin (NEURONTIN) 300 MG capsule TAKE 1 CAPSULE DAILY (Patient taking differently: Take 300 mg by mouth 3 times daily. ) 90 capsule 1    tadalafil 2.5 MG TABS Take 1 tab po qd prn erectile dysfunction 30 tablet 2    pantoprazole (PROTONIX) 20 MG tablet Take 1 tablet by mouth daily 30 tablet 2    buPROPion (WELLBUTRIN XL) 300 MG extended release tablet TAKE 1 TABLET DAILY 90 tablet 2    aspirin EC 81 MG EC tablet Take 1 tablet by mouth daily 90 tablet 3     No current facility-administered medications for this visit.      Levofloxacin   Health Maintenance   Topic Date Due    COVID-19 Vaccine (1) Never done    Shingles Vaccine (1 of 2) Never done    Lipid screen  09/27/2020    Potassium monitoring  10/09/2020    Creatinine monitoring  10/09/2020    Hepatitis C screen  01/31/2023 (Originally 1960)    HIV screen  01/31/2023 (Originally 8/8/1975)    Depression Monitoring  03/16/2022    Pneumococcal 0-64 years Vaccine (2 of 4 - PPSV23) 12/17/2024    DTaP/Tdap/Td vaccine (2 - Td or Tdap) 12/17/2029    Colon cancer screen colonoscopy  12/27/2029    Flu vaccine  Completed    Hepatitis A vaccine  Aged Out    Hepatitis B vaccine  Aged Out    Hib vaccine  Aged Out    Meningococcal (ACWY) vaccine  Aged Out       Review of Systems:  General: No F/C/NS/fatigue/wt loss   Cardiovascular: No CP  Respiratory: No SOB  GI: No N/V/D/C/abd pain/blood in stool  Neuro: No HA/weakness  Psych: No depressed mood/anxiety  Musculoskeletal: No myalgias    OBJECTIVE:  /80 (Site: Left Upper Arm, Position: Sitting, Cuff Size: Small Adult)   Pulse 70   Temp 97.7 °F (36.5 °C) (Oral)   Ht 6' 1\" (1.854 m)   Wt 219 lb 3.2 oz (99.4 kg)   SpO2 98%   BMI 28.92 kg/m²      Physical exam:  afebrile, vitals reviewed  Gen:  WD, WN, NAD, A&Ox3, pleasant  Eyes:  Sclerae clear  Neck:  Supple, No cervical or submandibular LAD. No obvious thyromegaly. Heart:  RRR, no murmur, rubs, gallops  Lungs:  CTAB, no W/R/R  Abd:  soft, NT/ND  Skin: No obvious rashes     ASSESSMENT/PLAN:  1. Encounter to establish care  Preventative Medicine / HCM visit with no emergent concerns. - UTD on colon cancer screening (cscope or FOBT or sigmoidoscopy); due in 2029  - HIV declined  - HCV jakob screen declined  - Recommended yearly HCM visits  - Encouraged annual flu shot and age-appropriate immunizations; flu shot today  - Discussed importance of healthy diet and exercise (5x/week, 20-30 minutes of sustained cardiovascular training)    2. Need for immunization against influenza  -     INFLUENZA, MDCK QUADV, 2 YRS AND OLDER, IM, PF, PREFILL SYR OR SDV, 0.5ML (FLUCELVAX QUADV, PF)  3. Healthcare maintenance  -     Basic Metabolic Panel; Future  -     Lipid Panel; Future  -     Hemoglobin A1C; Future  4. Chronic cough  Est, uncontrolled. Finish doxy and PO steroids. If cough persists for another 2 weeks, recommend referral to pulm. He may have chemotherapy-induced dysfunction. Pt agrees    Return in about 4 weeks (around 2/28/2022) for lab review. Electronically signed by Lilly Elliott MD on 1/31/2022 at 10:49 AM EST    Please note, portions of this note were completed with a voice recognition program.  Although every effort was made to ensure the accuracy of this automated transcription, some errors in transcription may have occurred.

## 2022-02-08 ENCOUNTER — OFFICE VISIT (OUTPATIENT)
Dept: FAMILY MEDICINE CLINIC | Age: 62
End: 2022-02-08
Payer: MEDICARE

## 2022-02-08 VITALS
WEIGHT: 219.4 LBS | BODY MASS INDEX: 29.08 KG/M2 | TEMPERATURE: 98.5 F | RESPIRATION RATE: 16 BRPM | DIASTOLIC BLOOD PRESSURE: 93 MMHG | HEIGHT: 73 IN | SYSTOLIC BLOOD PRESSURE: 163 MMHG | OXYGEN SATURATION: 95 % | HEART RATE: 70 BPM

## 2022-02-08 DIAGNOSIS — R07.89 ATYPICAL CHEST PAIN: Primary | ICD-10-CM

## 2022-02-08 DIAGNOSIS — R03.0 ELEVATED BLOOD PRESSURE READING: ICD-10-CM

## 2022-02-08 PROCEDURE — G8419 CALC BMI OUT NRM PARAM NOF/U: HCPCS | Performed by: FAMILY MEDICINE

## 2022-02-08 PROCEDURE — 3017F COLORECTAL CA SCREEN DOC REV: CPT | Performed by: FAMILY MEDICINE

## 2022-02-08 PROCEDURE — 1036F TOBACCO NON-USER: CPT | Performed by: FAMILY MEDICINE

## 2022-02-08 PROCEDURE — G8482 FLU IMMUNIZE ORDER/ADMIN: HCPCS | Performed by: FAMILY MEDICINE

## 2022-02-08 PROCEDURE — 99213 OFFICE O/P EST LOW 20 MIN: CPT | Performed by: FAMILY MEDICINE

## 2022-02-08 PROCEDURE — G8427 DOCREV CUR MEDS BY ELIG CLIN: HCPCS | Performed by: FAMILY MEDICINE

## 2022-02-08 NOTE — PROGRESS NOTES
Chief complaint: Cough (started off productive cough w/greenish/brownish mucus, now purulent-white phlegm  (since 2021)), Chest Congestion (drainage running down throat into chest (since 2021)), Chest Pain, Shortness of Breath (w/exertion ), and Fatigue      SUBJECTIVE:  JEOVAYN Armenta (:  1960) is a 63 y.o. male with a past medical history of cardiomyopathy and afib who presents with a chief complaint of: Rock in middle of chest- a large belch helps him feel better; aspirin and metoprolol didn't help when it was happening. Hasn't thought about foods that make it better or worse. 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 Exerting himself brings on the coughing spells,like when he was moving stuff from old home to new home in [place] recently. He attributes it to being out of shape and the cold weather. Overall the sinus congestion and cough have improved from last week. He is done w/ doxy and prednisone that he got from urgent care. He isn't taking protonix. Was previously taking protonix for nausea, he thinks. His blood pressure is high today. He's never had high blood pressure. He used to be 120s/80s. He laments that chemo really messed him up (caused his heart problems, etc).     Review of Systems:  General: No F/C/NS/fatigue/wt loss   Cardiovascular: No CP  Respiratory: No SOB  GI: No N/V/D/C/abd pain/blood in stool  Neuro: No HA/weakness  Psych: No depressed mood/anxiety  Musculoskeletal: No myalgias    Past Medical History:   Diagnosis Date    Allergic rhinitis     Anesthesia complication     INCREASED HEART RATE AFTER PORT PLACEMENT    Arthritis     Atrial fibrillation [facility])     2 episodes:paroxysmal:prior cardiologist.    Coronary atherosclerosis 2015    Depression     under care of psychiatry    [facility]ilated [facility]     Hodgkin's disease ([facility]     2000 hodgkins stage 4:Dr. Araseli Bonilla    Hyperlipidemia     Neuropathy     Prolonged emergence from general anesthesia     X1    S/P colonoscopy 12/27/2019    Dr. Javi Trent Testicular pain, left 8/21/2012    Viral pharyngitis 11/27/2019    Vitamin D deficiency      Current Outpatient Medications on File Prior to Visit   Medication Sig Dispense Refill    ondansetron (ZOFRAN) 4 MG tablet Take 1 tablet by mouth daily as needed for Nausea or Vomiting 30 tablet 0    albuterol (PROVENTIL) (2.5 MG/3ML) 0.083% nebulizer solution Take 3 mLs by nebulization every 6 hours as needed for Wheezing 120 each 3    sertraline (ZOLOFT) 100 MG tablet TAKE 1 TABLET BY MOUTH  DAILY 90 tablet 3    vitamin D (ERGOCALCIFEROL) 1.25 MG (08880 UT) CAPS capsule TAKE 1 CAPSULE BY MOUTH  ONCE WEEKLY 13 capsule 3    losartan (COZAAR) 25 MG tablet Take 1 tablet by mouth daily 90 tablet 3    metoprolol succinate (TOPROL XL) 25 MG extended release tablet Take 1 tablet by mouth daily 90 tablet 3    atorvastatin (LIPITOR) 40 MG tablet Take 1 tablet by mouth daily 90 tablet 3    naproxen (NAPROSYN) 500 MG tablet Take 1 tablet by mouth 2 times daily (with meals) 30 tablet 0    fluticasone (FLONASE) 50 MCG/ACT nasal spray USE 1 SPRAY IN EACH NOSTRIL DAILY 16 g 5    gabapentin (NEURONTIN) 300 MG capsule TAKE 1 CAPSULE DAILY (Patient taking differently: Take 300 mg by mouth 3 times daily. ) 90 capsule 1    tadalafil 2.5 MG TABS Take 1 tab po qd prn erectile dysfunction 30 tablet 2    buPROPion (WELLBUTRIN XL) 300 MG extended release tablet TAKE 1 TABLET DAILY 90 tablet 2    aspirin EC 81 MG EC tablet Take 1 tablet by mouth daily 90 tablet 3     No current facility-administered medications on file prior to visit.        OBJECTIVE:  BP (!) 163/93 (Site: Left Upper Arm, Position: Sitting, Cuff Size: Medium Adult)   Pulse 70   Temp 98.5 °F (36.9 °C) (Oral)   Resp 16   Ht 6' 1\" (1.854 m)   Wt 219 lb 6.4 oz (99.5 kg)   SpO2 95%   BMI 28.95 kg/m²      Physical exam:  afebrile, vitals reviewed  Gen:  WD, WN, NAD, A&Ox3, pleasant  Eyes:  Sclerae clear  Neck:  Supple, No cervical or submandibular LAD. No obvious thyromegaly. Heart:  RRR, no murmur, rubs, gallops; chest pain not reproducible with palpation  Lungs:  CTAB, no W/R/R  Abd:  soft, NT/ND  Skin: No obvious rashes      ASSESSMENT/PLAN:  1. Atypical chest pain  New, uncontrolled. Cough is improving. Non-exertional. No radiation. Improves with belching. Possibly related to GERD. Recommend 6-8 week trial of PPI therapy. He can  omeprazole or esomeprazole 20mg daily OTC  F/u in 4 weeks to eval response    2. Elevated blood pressure reading  New, uncontrolled. He is on metoprolol and cozaar for Cardiomyopathy/afib per cardiology. No hx of HTN. Recommend home bp checks and f/u in 4 weeks. Goal 130/80. May need higher dose of Cozaar to control it. Return in about 4 weeks (around 3/8/2022) for chest pain and blood pressure follow up. Electronically signed by Ana Conway MD on 2/8/2022 at 5:08 PM.     Please note, portions of this note were completed with a voice recognition program.  Although every effort was made to ensure the accuracy of this automated transcription, some errors in transcription may have occurred.

## 2022-02-21 ENCOUNTER — HOSPITAL ENCOUNTER (OUTPATIENT)
Dept: GENERAL RADIOLOGY | Age: 62
Discharge: HOME | End: 2022-02-21
Payer: MEDICARE

## 2022-02-21 ENCOUNTER — OFFICE VISIT (OUTPATIENT)
Dept: FAMILY MEDICINE CLINIC | Age: 62
End: 2022-02-21
Payer: MEDICARE

## 2022-02-21 VITALS
BODY MASS INDEX: 28.94 KG/M2 | SYSTOLIC BLOOD PRESSURE: 136 MMHG | WEIGHT: 218.4 LBS | DIASTOLIC BLOOD PRESSURE: 83 MMHG | TEMPERATURE: 97.8 F | HEART RATE: 97 BPM | HEIGHT: 73 IN | OXYGEN SATURATION: 99 %

## 2022-02-21 DIAGNOSIS — R07.89 ATYPICAL CHEST PAIN: ICD-10-CM

## 2022-02-21 DIAGNOSIS — Z00.00 HEALTHCARE MAINTENANCE: ICD-10-CM

## 2022-02-21 DIAGNOSIS — R05.3 CHRONIC COUGH: ICD-10-CM

## 2022-02-21 DIAGNOSIS — R05.3 CHRONIC COUGH: Primary | ICD-10-CM

## 2022-02-21 DIAGNOSIS — C81.90 HODGKIN LYMPHOMA, UNSPECIFIED HODGKIN LYMPHOMA TYPE, UNSPECIFIED BODY REGION (HCC): ICD-10-CM

## 2022-02-21 DIAGNOSIS — I42.0 DILATED CARDIOMYOPATHY (HCC): ICD-10-CM

## 2022-02-21 LAB
A/G RATIO: 2.8 (ref 1.1–2.2)
ALBUMIN SERPL-MCNC: 4.7 G/DL (ref 3.4–5)
ALP BLD-CCNC: 104 U/L (ref 40–129)
ALT SERPL-CCNC: 18 U/L (ref 10–40)
ANION GAP SERPL CALCULATED.3IONS-SCNC: 15 MMOL/L (ref 3–16)
AST SERPL-CCNC: 16 U/L (ref 15–37)
BASOPHILS ABSOLUTE: 0.1 K/UL (ref 0–0.2)
BASOPHILS RELATIVE PERCENT: 1.1 %
BILIRUB SERPL-MCNC: 0.4 MG/DL (ref 0–1)
BUN BLDV-MCNC: 14 MG/DL (ref 7–20)
C-REACTIVE PROTEIN: 3.4 MG/L (ref 0–5.1)
CALCIUM SERPL-MCNC: 9.2 MG/DL (ref 8.3–10.6)
CHLORIDE BLD-SCNC: 102 MMOL/L (ref 99–110)
CHOLESTEROL, TOTAL: 246 MG/DL (ref 0–199)
CO2: 24 MMOL/L (ref 21–32)
CREAT SERPL-MCNC: 0.8 MG/DL (ref 0.9–1.3)
EOSINOPHILS ABSOLUTE: 0.8 K/UL (ref 0–0.6)
EOSINOPHILS RELATIVE PERCENT: 15.5 %
GFR AFRICAN AMERICAN: >60
GFR NON-AFRICAN AMERICAN: >60
GLUCOSE BLD-MCNC: 96 MG/DL (ref 70–99)
HCT VFR BLD CALC: 38.1 % (ref 40.5–52.5)
HDLC SERPL-MCNC: 57 MG/DL (ref 40–60)
HEMOGLOBIN: 12.7 G/DL (ref 13.5–17.5)
LDL CHOLESTEROL CALCULATED: 152 MG/DL
LYMPHOCYTES ABSOLUTE: 1.1 K/UL (ref 1–5.1)
LYMPHOCYTES RELATIVE PERCENT: 21.7 %
MCH RBC QN AUTO: 30.5 PG (ref 26–34)
MCHC RBC AUTO-ENTMCNC: 33.4 G/DL (ref 31–36)
MCV RBC AUTO: 91.4 FL (ref 80–100)
MONOCYTES ABSOLUTE: 0.6 K/UL (ref 0–1.3)
MONOCYTES RELATIVE PERCENT: 10.9 %
NEUTROPHILS ABSOLUTE: 2.7 K/UL (ref 1.7–7.7)
NEUTROPHILS RELATIVE PERCENT: 50.8 %
PDW BLD-RTO: 13.6 % (ref 12.4–15.4)
PLATELET # BLD: 335 K/UL (ref 135–450)
PMV BLD AUTO: 7.5 FL (ref 5–10.5)
POTASSIUM SERPL-SCNC: 4.3 MMOL/L (ref 3.5–5.1)
RBC # BLD: 4.16 M/UL (ref 4.2–5.9)
SEDIMENTATION RATE, ERYTHROCYTE: 16 MM/HR (ref 0–20)
SODIUM BLD-SCNC: 141 MMOL/L (ref 136–145)
TOTAL PROTEIN: 6.4 G/DL (ref 6.4–8.2)
TRIGL SERPL-MCNC: 186 MG/DL (ref 0–150)
VLDLC SERPL CALC-MCNC: 37 MG/DL
WBC # BLD: 5.3 K/UL (ref 4–11)

## 2022-02-21 PROCEDURE — 1036F TOBACCO NON-USER: CPT | Performed by: FAMILY MEDICINE

## 2022-02-21 PROCEDURE — G8419 CALC BMI OUT NRM PARAM NOF/U: HCPCS | Performed by: FAMILY MEDICINE

## 2022-02-21 PROCEDURE — 99213 OFFICE O/P EST LOW 20 MIN: CPT | Performed by: FAMILY MEDICINE

## 2022-02-21 PROCEDURE — G8482 FLU IMMUNIZE ORDER/ADMIN: HCPCS | Performed by: FAMILY MEDICINE

## 2022-02-21 PROCEDURE — 3017F COLORECTAL CA SCREEN DOC REV: CPT | Performed by: FAMILY MEDICINE

## 2022-02-21 PROCEDURE — G8427 DOCREV CUR MEDS BY ELIG CLIN: HCPCS | Performed by: FAMILY MEDICINE

## 2022-02-21 PROCEDURE — 71046 X-RAY EXAM CHEST 2 VIEWS: CPT

## 2022-02-21 NOTE — PROGRESS NOTES
Chief complaint: Cough (productive w/white to purulent phlegm at times, wheezing some, excessive cough & labored breathing w/exertion  x6 weeks; negative COVID & Pneumonia test 3 days after first S/S. Pt reports he is not COVID vaccinated )      SUBJECTIVE:  JEOVANY Hollingsworth (:  1960) is a 63 y.o. male past medical history of Hodgkin Lymphoma (s/p chemo in ), cardiomyopathy and afib who presents with a chief complaint of: chronic cough. He was seen for this on . The belching and chest pain are better with being on prilosec for the last 2 weeks. He is still having coughing fits, especially overnight that is affecting his sleep. He also has shortness of breath with exertion. No chest pain with exertion. He denies wt loss or any of the other symptoms he had when his lymphoma was diagnosed.      Review of Systems:  General: No F/C/NS/fatigue/wt loss   Cardiovascular: No CP  Respiratory: No SOB  GI: No N/V/D/C/abd pain/blood in stool  Neuro: No HA/weakness  Psych: No depressed mood/anxiety  Musculoskeletal: No myalgias    Past Medical History:   Diagnosis Date    Allergic rhinitis     Anesthesia complication     INCREASED HEART RATE AFTER PORT PLACEMENT    Arthritis     Atrial fibrillation [facility])     2 episodes:paroxysmal:prior cardiologist.    Coronary atherosclerosis 2015    Depression     under care of psychiatry    Dilated cardiomyopathy ([facility] [address]     Hodgkin's disease ([facility] [address]      hodgkins stage 4:Dr. Mike Jovel    Hyperlipidemia     Neuropathy     Prolonged emergence from general anesthesia     X1    S/P colonoscopy 2019    Dr. Gaby Sesay Testicular pain, left 2012    Viral pharyngitis 2019    Vitamin D deficiency      Current Outpatient Medications on File Prior to Visit   Medication Sig Dispense Refill    ondansetron (ZOFRAN) 4 MG tablet Take 1 tablet by mouth daily as needed for Nausea or Vomiting 30 tablet 0    albuterol (PROVENTIL) (2.5 MG/3ML) 0.083% nebulizer solution Take 3 mLs by nebulization every 6 hours as needed for Wheezing 120 each 3    sertraline (ZOLOFT) 100 MG tablet TAKE 1 TABLET BY MOUTH  DAILY 90 tablet 3    vitamin D (ERGOCALCIFEROL) 1.25 MG (62140 UT) CAPS capsule TAKE 1 CAPSULE BY MOUTH  ONCE WEEKLY 13 capsule 3    losartan (COZAAR) 25 MG tablet Take 1 tablet by mouth daily 90 tablet 3    metoprolol succinate (TOPROL XL) 25 MG extended release tablet Take 1 tablet by mouth daily 90 tablet 3    atorvastatin (LIPITOR) 40 MG tablet Take 1 tablet by mouth daily 90 tablet 3    naproxen (NAPROSYN) 500 MG tablet Take 1 tablet by mouth 2 times daily (with meals) 30 tablet 0    fluticasone (FLONASE) 50 MCG/ACT nasal spray USE 1 SPRAY IN EACH NOSTRIL DAILY 16 g 5    tadalafil 2.5 MG TABS Take 1 tab po qd prn erectile dysfunction 30 tablet 2    buPROPion (WELLBUTRIN XL) 300 MG extended release tablet TAKE 1 TABLET DAILY 90 tablet 2    aspirin EC 81 MG EC tablet Take 1 tablet by mouth daily 90 tablet 3    gabapentin (NEURONTIN) 300 MG capsule TAKE 1 CAPSULE DAILY (Patient taking differently: Take 300 mg by mouth 3 times daily. ) 90 capsule 1     No current facility-administered medications on file prior to visit. OBJECTIVE:  /83 (Site: Right Upper Arm, Position: Sitting, Cuff Size: Large Adult)   Pulse 97   Temp 97.8 °F (36.6 °C) (Temporal)   Ht 6' 1\" (1.854 m)   Wt 218 lb 6.4 oz (99.1 kg)   SpO2 99%   BMI 28.81 kg/m²      Physical exam:  afebrile, vitals reviewed  Gen:  WD, WN, NAD, A&Ox3, pleasant  Eyes:  Sclerae clear  Neck:  Supple, No cervical or submandibular LAD. No obvious thyromegaly. Heart:  RRR, no murmur, rubs, gallops  Lungs:  CTAB, no W/R/R  Abd:  soft, NT/ND  Skin: No obvious rashes      ASSESSMENT/PLAN:  1. Chronic cough  Est, uncontrolled. Non productive. S/p prednisone burst and z-pack. Concern for chemotherapy induced dysfunction. CXR to eval for overt pathology.  Labs to eval for systemic inflammation. Referral to pulm for PFTs and discussion of high res CT if restrictive lung disease is present.  -     CBC with Auto Differential; Future  -     Sedimentation Rate; Future  -     C-Reactive Protein; Future  -     XR CHEST (2 VW); Future  -     Comprehensive Metabolic Panel; Future  -     Moncho Hi MD, Pulmonary, [place]  2. Healthcare maintenance  -     Lipid Panel; Future  3. Hodgkin lymphoma, unspecified Hodgkin lymphoma type, unspecified body region [facility])  -     Moncho Hi MD, Pulmonary, [place]  4. Dilated cardiomyopathy ([facility] [address]  -     Moncho Hi MD, Pulmonary, [place]  5. Atypical chest pain    Return for lab review as needed. Electronically signed by Arina Stapleton MD on 2/21/2022 at 3:26 PM.     Please note, portions of this note were completed with a voice recognition program.  Although every effort was made to ensure the accuracy of this automated transcription, some errors in transcription may have occurred.

## 2022-02-22 ENCOUNTER — TELEPHONE (OUTPATIENT)
Dept: PULMONOLOGY | Age: 62
End: 2022-02-22

## 2022-02-22 DIAGNOSIS — R05.3 CHRONIC COUGH: Primary | ICD-10-CM

## 2022-02-22 LAB
ESTIMATED AVERAGE GLUCOSE: 111.2 MG/DL
HBA1C MFR BLD: 5.5 %

## 2022-02-22 NOTE — TELEPHONE ENCOUNTER
Referred By: Joanne Bhakta MD    Reason: chronic cough     Previous Testing: cxr 2/21/22    Insurance:     New Testing Needed: pfts    Appt Date/Time: 4/12/22

## 2022-02-25 DIAGNOSIS — E78.00 PURE HYPERCHOLESTEROLEMIA: Primary | ICD-10-CM

## 2022-02-28 RX ORDER — ATORVASTATIN CALCIUM 80 MG/1
80 TABLET, FILM COATED ORAL DAILY
Qty: 90 TABLET | Refills: 1 | Status: SHIPPED | OUTPATIENT
Start: 2022-02-28 | End: 2022-10-19

## 2022-03-01 DIAGNOSIS — D64.9 ANEMIA, UNSPECIFIED TYPE: Primary | ICD-10-CM

## 2022-03-04 ENCOUNTER — HOSPITAL ENCOUNTER (OUTPATIENT)
Dept: PULMONOLOGY | Age: 62
Discharge: HOME | End: 2022-03-04
Payer: MEDICARE

## 2022-03-04 VITALS — HEART RATE: 68 BPM | RESPIRATION RATE: 18 BRPM | OXYGEN SATURATION: 93 %

## 2022-03-04 DIAGNOSIS — R05.3 CHRONIC COUGH: ICD-10-CM

## 2022-03-04 LAB
DLCO %PRED: 107 %
DLCO PRED: NORMAL
DLCO/VA %PRED: NORMAL
DLCO/VA PRED: NORMAL
DLCO/VA: NORMAL
DLCO: NORMAL
EXPIRATORY TIME-POST: NORMAL
EXPIRATORY TIME: NORMAL
FEF 25-75% %CHNG: NORMAL
FEF 25-75% %PRED-POST: NORMAL
FEF 25-75% %PRED-PRE: NORMAL
FEF 25-75% PRED: NORMAL
FEF 25-75%-POST: NORMAL
FEF 25-75%-PRE: NORMAL
FEV1 %PRED-POST: 78 %
FEV1 %PRED-PRE: 77 %
FEV1 PRED: NORMAL
FEV1-POST: NORMAL
FEV1-PRE: NORMAL
FEV1/FVC %PRED-POST: NORMAL
FEV1/FVC %PRED-PRE: NORMAL
FEV1/FVC PRED: NORMAL
FEV1/FVC-POST: 79 %
FEV1/FVC-PRE: 76 %
FVC %PRED-POST: NORMAL
FVC %PRED-PRE: NORMAL
FVC PRED: NORMAL
FVC-POST: NORMAL
FVC-PRE: NORMAL
GAW %PRED: NORMAL
GAW PRED: NORMAL
GAW: NORMAL
IC %PRED: NORMAL
IC PRED: NORMAL
IC: NORMAL
MEP: NORMAL
MIP: NORMAL
MVV %PRED-PRE: NORMAL
MVV PRED: NORMAL
MVV-PRE: NORMAL
PEF %PRED-POST: NORMAL
PEF %PRED-PRE: NORMAL
PEF PRED: NORMAL
PEF%CHNG: NORMAL
PEF-POST: NORMAL
PEF-PRE: NORMAL
RAW %PRED: NORMAL
RAW PRED: NORMAL
RAW: NORMAL
RV %PRED: NORMAL
RV PRED: NORMAL
RV: NORMAL
SVC %PRED: NORMAL
SVC PRED: NORMAL
SVC: NORMAL
TLC %PRED: 83 %
TLC PRED: NORMAL
TLC: NORMAL
VA %PRED: NORMAL
VA PRED: NORMAL
VA: NORMAL
VTG %PRED: NORMAL
VTG PRED: NORMAL
VTG: NORMAL

## 2022-03-04 PROCEDURE — 94200 LUNG FUNCTION TEST (MBC/MVV): CPT

## 2022-03-04 PROCEDURE — 94729 DIFFUSING CAPACITY: CPT

## 2022-03-04 PROCEDURE — 6370000000 HC RX 637 (ALT 250 FOR IP): Performed by: INTERNAL MEDICINE

## 2022-03-04 PROCEDURE — 94760 N-INVAS EAR/PLS OXIMETRY 1: CPT

## 2022-03-04 PROCEDURE — 94726 PLETHYSMOGRAPHY LUNG VOLUMES: CPT

## 2022-03-04 PROCEDURE — 94060 EVALUATION OF WHEEZING: CPT

## 2022-03-04 RX ORDER — ALBUTEROL SULFATE 90 UG/1
4 AEROSOL, METERED RESPIRATORY (INHALATION) ONCE
Status: COMPLETED | OUTPATIENT
Start: 2022-03-04 | End: 2022-03-04

## 2022-03-04 RX ADMIN — Medication 4 PUFF: at 11:23

## 2022-03-04 ASSESSMENT — PULMONARY FUNCTION TESTS
FEV1_PERCENT_PREDICTED_POST: 78
FEV1/FVC_PRE: 76
FEV1_PERCENT_PREDICTED_PRE: 77
FEV1/FVC_POST: 79

## 2022-03-07 NOTE — PROCEDURES
Pulmonary Function Testing      Patient name:  Ashley Hinds     [facility] Unit #:   [id number]   Date of test: 3/4/2022  Date of interpretation:   3/7/2022    Mr. Ashley Hinds is a 63y.o. year-old non smoker. The spirometry data were acceptable and reproducible. Spirometry:  Flow volume loops were normal. The FEV-1/FVC ratio was normal. The  post-bronchodilator FEV-1 was 3.13 liters (78% of predicted), which was normal. The FVC was 3.94 liters (75% of predicted), which was normal. Response to inhaled bronchodilators (albuterol) was not significant. Lung volumes:  Lung volumes were tested by plethysmography.  The total lung capacity was 6.16 liters (83% of predicted), which was normal. The residual volume was 1.91 liters (75% of predicted), which was normal. The ratio of residual volume to total lung capacity (RV/TLC) was 84, which was normal. Specific airway resistance was normal.    Diffusion capacity was found to be normal.       Interpretation:  Normal pulmonary function test

## 2022-04-12 ENCOUNTER — OFFICE VISIT (OUTPATIENT)
Dept: PULMONOLOGY | Age: 62
End: 2022-04-12
Payer: MEDICARE

## 2022-04-12 VITALS
HEIGHT: 73 IN | BODY MASS INDEX: 28.89 KG/M2 | HEART RATE: 67 BPM | WEIGHT: 218 LBS | SYSTOLIC BLOOD PRESSURE: 130 MMHG | OXYGEN SATURATION: 97 % | TEMPERATURE: 96.5 F | DIASTOLIC BLOOD PRESSURE: 83 MMHG

## 2022-04-12 DIAGNOSIS — R05.3 CHRONIC COUGH: Primary | ICD-10-CM

## 2022-04-12 PROCEDURE — 99203 OFFICE O/P NEW LOW 30 MIN: CPT | Performed by: INTERNAL MEDICINE

## 2022-04-12 PROCEDURE — 3017F COLORECTAL CA SCREEN DOC REV: CPT | Performed by: INTERNAL MEDICINE

## 2022-04-12 PROCEDURE — 1036F TOBACCO NON-USER: CPT | Performed by: INTERNAL MEDICINE

## 2022-04-12 PROCEDURE — G8419 CALC BMI OUT NRM PARAM NOF/U: HCPCS | Performed by: INTERNAL MEDICINE

## 2022-04-12 PROCEDURE — G8427 DOCREV CUR MEDS BY ELIG CLIN: HCPCS | Performed by: INTERNAL MEDICINE

## 2022-04-12 NOTE — PROGRESS NOTES
[facility] Pulmonary and Critical Care    Outpatient Initial Note    Subjective:   Referring Physician: Alex Davis / HPI:     The patient is 63 y.o. male who presents today for a new patient visit for cough. Patient has a history of congestive heart failure and A. fib and developed a cough in December of this year that lasted for 2 months. No treatment seem to improve the cough during that time. He had pulmonary function testing done at the beginning of March and the cough seemed to go away thereafter.        Past Medical History:    Past Medical History:   Diagnosis Date    Allergic rhinitis     Anesthesia complication     INCREASED HEART RATE AFTER PORT PLACEMENT    Arthritis     Atrial fibrillation [facility])     2 episodes:paroxysmal:prior cardiologist.    Coronary atherosclerosis 6/24/2015    Depression     under care of psychiatry    Dilated cardiomyopathy ([facility] [address]     Hodgkin's disease ([facility] [address]     2000 hodgkins stage 4:Dr. Emelina Glover    Hyperlipidemia     Neuropathy     Prolonged emergence from general anesthesia     X1    S/P colonoscopy 12/27/2019    Dr. Rachel De Leon Testicular pain, left 8/21/2012    Viral pharyngitis 11/27/2019    Vitamin D deficiency        Social History:    Social History     Tobacco Use   Smoking Status Never Smoker   Smokeless Tobacco Never Used       Family History:  Family History   Problem Relation Age of Onset    No Known Problems Mother     No Known Problems Father      Current Medications:  Current Outpatient Medications on File Prior to Visit   Medication Sig Dispense Refill    atorvastatin (LIPITOR) 80 MG tablet Take 1 tablet by mouth daily 90 tablet 1    albuterol (PROVENTIL) (2.5 MG/3ML) 0.083% nebulizer solution Take 3 mLs by nebulization every 6 hours as needed for Wheezing 120 each 3    sertraline (ZOLOFT) 100 MG tablet TAKE 1 TABLET BY MOUTH  DAILY 90 tablet 3    vitamin D (ERGOCALCIFEROL) 1.25 MG (45441 UT) CAPS capsule TAKE 1 CAPSULE BY MOUTH  ONCE WEEKLY 13 capsule 3    losartan (COZAAR) 25 MG tablet Take 1 tablet by mouth daily 90 tablet 3    metoprolol succinate (TOPROL XL) 25 MG extended release tablet Take 1 tablet by mouth daily 90 tablet 3    fluticasone (FLONASE) 50 MCG/ACT nasal spray USE 1 SPRAY IN EACH NOSTRIL DAILY 16 g 5    tadalafil 2.5 MG TABS Take 1 tab po qd prn erectile dysfunction 30 tablet 2    buPROPion (WELLBUTRIN XL) 300 MG extended release tablet TAKE 1 TABLET DAILY 90 tablet 2    aspirin EC 81 MG EC tablet Take 1 tablet by mouth daily 90 tablet 3    gabapentin (NEURONTIN) 300 MG capsule TAKE 1 CAPSULE DAILY (Patient taking differently: Take 300 mg by mouth 3 times daily. ) 90 capsule 1     No current facility-administered medications on file prior to visit. Allergies:   Allergies   Allergen Reactions    Levofloxacin Itching     dizzy       REVIEW OF SYSTEMS:    CONSTITUTIONAL: Negative for fevers and chills  HEENT: Negative for oropharyngeal exudate, post nasal drip, sinus pain / pressure, nasal congestion, ear pain  RESPIRATORY:  See HPI  CARDIOVASCULAR: Negative for chest pain, palpitations, edema  GASTROINTESTINAL: Negative for nausea, vomiting, diarrhea, constipation and abdominal pain  HEMATOLOGICAL: Negative for adenopathy  SKIN: Negative for clubbing, cyanosis, skin lesions  EXTREMITIES: Negative for weakness, decreased ROM  NEUROLOGICAL: Negative for unilateral weakness, speech or gait abnormalities  PSYCH: Negative for anxiety, depression    Objective:   PHYSICAL EXAM:        VITALS:  /83 (Site: Right Upper Arm, Position: Sitting, Cuff Size: Medium Adult)   Pulse 67   Temp 96.5 °F (35.8 °C) (Infrared)   Ht 6' 1\" (1.854 m)   Wt 218 lb (98.9 kg)   SpO2 97%   BMI 28.76 kg/m²     CONSTITUTIONAL:  Awake, alert, cooperative, no apparent distress, and appears stated age  HEENT: No oropharyngeal exudate, PERRL, no cervical adenopathy, no tracheal deviation, thyroid size normal  LUNGS:  No increased work of breathing and clear to auscultation, no crackles or wheezing   CARDIOVASCULAR:  normal S1 and S2 and no JVD  ABDOMEN:  Normal bowel sounds, non-distended and non-tender to palpation  EXT: No edema, no calf tenderness. Pulses are present bilaterally. NEUROLOGIC:  Mental Status Exam:  Level of Alertness:   awake  Orientation:   person, place, time. SKIN:  normal skin color, texture, turgor, no redness, warmth, or swelling     DATA:      Radiology Review:  Pertinent images / reports were reviewed as a part of this visit. Cxr reveals the following:    Impression   No radiographic evidence of acute pulmonary disease. Last PFTs:    3/4/2022Spirometry:  Flow volume loops were normal. The FEV-1/FVC ratio was normal. The  post-bronchodilator FEV-1 was 3.13 liters (78% of predicted), which was normal. The FVC was 3.94 liters (75% of predicted), which was normal. Response to inhaled bronchodilators (albuterol) was not significant.     Lung volumes:  Lung volumes were tested by plethysmography.  The total lung capacity was 6.16 liters (83% of predicted), which was normal. The residual volume was 1.91 liters (75% of predicted), which was normal. The ratio of residual volume to total lung capacity (RV/TLC) was 84, which was normal. Specific airway resistance was normal.     Diffusion capacity was found to be normal.        Interpretation:  Normal pulmonary function test    Immunizations:   Immunization History   Administered Date(s) Administered    Hib vaccine 12/17/2019    Influenza Virus Vaccine 12/17/2019, 12/17/2019    Influenza, MDCK Quadv, IM, PF (Flucelvax 2 yrs and older) 01/31/2022    Meningococcal B, OMV (Bexsero) 12/17/2019    Meningococcal, MCV4, Unspecifd Conjugate (A,C,Y and W-135) 12/17/2019    Pneumococcal Conjugate 13-valent ([id number]) 12/17/2019    Pneumococcal Polysaccharide ([id number]) 12/17/2019    Tdap (Boostrix, Adacel) 12/17/2019       Assessment: This is a 63 y.o. male with cough    Plan:   -Cough has resolved at this point, no clear identifiable cause was found. However, he had been on an ACE inhibitor until a couple months before the cough resolved and I suspect he had an idiosyncratic cough reaction to lisinopril. If that was the underlying cause then the cough should not return other than for acute respiratory issues. I will have patient follow-up as needed    Call or RTC sooner if symptoms persist or worsen acutely.

## 2022-04-20 ENCOUNTER — OFFICE VISIT (OUTPATIENT)
Dept: FAMILY MEDICINE CLINIC | Age: 62
End: 2022-04-20

## 2022-04-20 VITALS
HEIGHT: 73 IN | HEART RATE: 64 BPM | RESPIRATION RATE: 16 BRPM | WEIGHT: 220 LBS | SYSTOLIC BLOOD PRESSURE: 125 MMHG | TEMPERATURE: 97.2 F | BODY MASS INDEX: 29.16 KG/M2 | OXYGEN SATURATION: 99 % | DIASTOLIC BLOOD PRESSURE: 86 MMHG

## 2022-04-20 DIAGNOSIS — G62.9 NEUROPATHY: ICD-10-CM
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
## 2022-04-20 DIAGNOSIS — L03.116 CELLULITIS OF LEFT LOWER LEG: ICD-10-CM

## 2022-04-20 DIAGNOSIS — E55.9 VITAMIN D DEFICIENCY: ICD-10-CM

## 2022-04-20 DIAGNOSIS — F32.2 SEVERE SINGLE CURRENT EPISODE OF MAJOR DEPRESSIVE DISORDER, WITHOUT PSYCHOTIC FEATURES (HCC): ICD-10-CM

## 2022-04-20 DIAGNOSIS — N52.8 OTHER MALE ERECTILE DYSFUNCTION: ICD-10-CM

## 2022-04-20 DIAGNOSIS — Z09 HOSPITAL DISCHARGE FOLLOW-UP: Primary | ICD-10-CM

## 2022-04-20 PROBLEM — T42.72XA: Status: RESOLVED | Noted: 2022-01-31 | Resolved: 2022-04-20

## 2022-04-20 PROCEDURE — 99214 OFFICE O/P EST MOD 30 MIN: CPT | Performed by: FAMILY MEDICINE

## 2022-04-20 PROCEDURE — 1111F DSCHRG MED/CURRENT MED MERGE: CPT | Performed by: FAMILY MEDICINE

## 2022-04-20 RX ORDER — TADALAFIL 2.5 MG/1
TABLET ORAL
Qty: 30 TABLET | Refills: 2 | Status: SHIPPED | OUTPATIENT
Start: 2022-04-20 | End: 2022-05-19 | Stop reason: SDUPTHER

## 2022-04-20 RX ORDER — AMOXICILLIN AND CLAVULANATE POTASSIUM 875; 125 MG/1; MG/1
1 TABLET, FILM COATED ORAL 2 TIMES DAILY
Qty: 10 TABLET | Refills: 0 | Status: SHIPPED | OUTPATIENT
Start: 2022-04-20 | End: 2022-04-25

## 2022-04-20 RX ORDER — LIDOCAINE 50 MG/G
1 PATCH TOPICAL EVERY 24 HOURS
COMMUNITY
Start: 2022-04-12 | End: 2022-10-13 | Stop reason: CLARIF

## 2022-04-20 RX ORDER — GABAPENTIN 300 MG/1
300 CAPSULE ORAL 3 TIMES DAILY
Qty: 270 CAPSULE | Refills: 3 | Status: SHIPPED | OUTPATIENT
Start: 2022-04-20 | End: 2023-04-20

## 2022-04-20 RX ORDER — NALOXONE HYDROCHLORIDE 4 MG/.1ML
1 SPRAY NASAL PRN
COMMUNITY
Start: 2022-04-12 | End: 2022-10-13 | Stop reason: CLARIF

## 2022-04-20 RX ORDER — ERGOCALCIFEROL 1.25 MG/1
CAPSULE ORAL
Qty: 13 CAPSULE | Refills: 3 | Status: SHIPPED | OUTPATIENT
Start: 2022-04-20

## 2022-04-20 RX ORDER — CYCLOBENZAPRINE HCL 10 MG
1 TABLET ORAL
COMMUNITY
Start: 2022-04-12 | End: 2022-08-09

## 2022-04-20 RX ORDER — OXYCODONE HYDROCHLORIDE 5 MG/1
TABLET ORAL
COMMUNITY
Start: 2022-04-12 | End: 2022-08-09

## 2022-04-20 RX ORDER — IBUPROFEN 600 MG/1
600 TABLET ORAL 4 TIMES DAILY PRN
Qty: 40 TABLET | Refills: 0 | Status: SHIPPED | OUTPATIENT
Start: 2022-04-20 | End: 2022-08-09

## 2022-04-20 ASSESSMENT — PATIENT HEALTH QUESTIONNAIRE - PHQ9
4. FEELING TIRED OR HAVING LITTLE ENERGY: 0
SUM OF ALL RESPONSES TO PHQ QUESTIONS 1-9: 0
5. POOR APPETITE OR OVEREATING: 0
9. THOUGHTS THAT YOU WOULD BE BETTER OFF DEAD, OR OF HURTING YOURSELF: 0
2. FEELING DOWN, DEPRESSED OR HOPELESS: 0
3. TROUBLE FALLING OR STAYING ASLEEP: 0
10. IF YOU CHECKED OFF ANY PROBLEMS, HOW DIFFICULT HAVE THESE PROBLEMS MADE IT FOR YOU TO DO YOUR WORK, TAKE CARE OF THINGS AT HOME, OR GET ALONG WITH OTHER PEOPLE: 0
SUM OF ALL RESPONSES TO PHQ QUESTIONS 1-9: 0
SUM OF ALL RESPONSES TO PHQ9 QUESTIONS 1 & 2: 0
1. LITTLE INTEREST OR PLEASURE IN DOING THINGS: 0
SUM OF ALL RESPONSES TO PHQ QUESTIONS 1-9: 0
7. TROUBLE CONCENTRATING ON THINGS, SUCH AS READING THE NEWSPAPER OR WATCHING TELEVISION: 0
8. MOVING OR SPEAKING SO SLOWLY THAT OTHER PEOPLE COULD HAVE NOTICED. OR THE OPPOSITE, BEING SO FIGETY OR RESTLESS THAT YOU HAVE BEEN MOVING AROUND A LOT MORE THAN USUAL: 0
SUM OF ALL RESPONSES TO PHQ QUESTIONS 1-9: 0
6. FEELING BAD ABOUT YOURSELF - OR THAT YOU ARE A FAILURE OR HAVE LET YOURSELF OR YOUR FAMILY DOWN: 0
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
## 2022-04-20 NOTE — PATIENT INSTRUCTIONS
Neck Spasm: Exercises  Introduction  Here are some examples of exercises for you to try. The exercises may be suggested for a condition or for rehabilitation. Start each exercise slowly. Ease off the exercises if you start to have pain. You will be told when to start these exercises and which ones will work bestfor you. How to do the exercises  Levator scapula stretch    1. Sit in a firm chair, or stand up straight. 2. Gently tilt your head toward your left shoulder. 3. Turn your head to look down into your armpit, bending your head slightly forward. Let the weight of your head stretch your neck muscles. 4. Hold for 15 to 30 seconds. 5. Return to your starting position. 6. Follow the same instructions above, but tilt your head toward your right shoulder. 7. Repeat 2 to 4 times toward each shoulder. Upper trapezius stretch    1. Sit in a firm chair, or stand up straight. 2. This stretch works best if you keep your shoulder down as you lean away from it. To help you remember to do this, start by relaxing your shoulders and lightly holding on to your thighs or your chair. 3. Tilt your head toward your shoulder and hold for 15 to 30 seconds. Let the weight of your head stretch your muscles. 4. If you would like a little added stretch, place your arm behind your back. Use the arm opposite of the direction you are tilting your head. For example, if you are tilting your head to the left, place your right arm behind your back. 5. Repeat 2 to 4 times toward each shoulder. Neck rotation    1. Sit in a firm chair, or stand up straight. 2. Keeping your chin level, turn your head to the right, and hold for 15 to 30 seconds. 3. Turn your head to the left, and hold for 15 to 30 seconds. 4. Repeat 2 to 4 times to each side. Chin tuck    1. Lie on the floor with a rolled-up towel under your neck. Your head should be touching the floor. 2. Slowly bring your chin toward the front of your neck.   3. Hold for a count of 6, and then relax for up to 10 seconds. 4. Repeat 8 to 12 times. Forward neck flexion    1. Sit in a firm chair, or stand up straight. 2. Bend your head forward. 3. Hold for 15 to 30 seconds, then return to your starting position. 4. Repeat 2 to 4 times. Follow-up care is a key part of your treatment and safety. Be sure to make and go to all appointments, and call your doctor if you are having problems. It's also a good idea to know your test results and keep alist of the medicines you take. Where can you learn more? Go to [email or web address] org and sign in to your [employer] account. Enter P962 in the [employer] box to learn more about \"Neck Spasm: Exercises. \"     If you do not have an account, please click on the \"Sign Up Now\" link. Current as of: July 1, 2021               Content Version: 13.2  © 2006-2022 [employer]. Care instructions adapted under license by [employer]. If you have questions about a medical condition or this instruction, always ask your healthcare professional. [place] [address] any warranty or liability for your use of this information.

## 2022-04-21 NOTE — TELEPHONE ENCOUNTER
Requested Prescriptions     Pending Prescriptions Disp Refills    atorvastatin (LIPITOR) 40 MG tablet [Pharmacy Med Name: Atorvastatin Calcium 40 MG Oral Tablet] 90 tablet 3     Sig: TAKE 1 TABLET BY MOUTH  DAILY    metoprolol succinate (TOPROL XL) 25 MG extended release tablet [Pharmacy Med Name: Metoprolol Succinate ER 25 MG Oral Tablet Extended Release 24 Hour] 90 tablet 3     Sig: TAKE 1 TABLET BY MOUTH  DAILY                Last Office Visit: 9/22/2021     Next Office Visit: 5/25/2022

## 2022-04-22 RX ORDER — ATORVASTATIN CALCIUM 80 MG/1
80 TABLET, FILM COATED ORAL DAILY
Qty: 90 TABLET | Refills: 3 | Status: SHIPPED | OUTPATIENT
Start: 2022-04-22 | End: 2022-06-17 | Stop reason: SDUPTHER

## 2022-04-22 RX ORDER — METOPROLOL SUCCINATE 25 MG/1
25 TABLET, EXTENDED RELEASE ORAL DAILY
Qty: 90 TABLET | Refills: 3 | Status: SHIPPED | OUTPATIENT
Start: 2022-04-22

## 2022-04-26 ENCOUNTER — OFFICE VISIT (OUTPATIENT)
Dept: FAMILY MEDICINE CLINIC | Age: 62
End: 2022-04-26
Payer: MEDICARE

## 2022-04-26 VITALS
HEART RATE: 71 BPM | HEIGHT: 73 IN | RESPIRATION RATE: 16 BRPM | DIASTOLIC BLOOD PRESSURE: 80 MMHG | TEMPERATURE: 97.7 F | WEIGHT: 223.6 LBS | OXYGEN SATURATION: 99 % | BODY MASS INDEX: 29.63 KG/M2 | SYSTOLIC BLOOD PRESSURE: 130 MMHG

## 2022-04-26 DIAGNOSIS — L03.116 CELLULITIS OF LEFT LOWER LEG: Primary | ICD-10-CM

## 2022-04-26 PROCEDURE — 99213 OFFICE O/P EST LOW 20 MIN: CPT | Performed by: FAMILY MEDICINE
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
## 2022-05-19 ENCOUNTER — OFFICE VISIT (OUTPATIENT)
Dept: FAMILY MEDICINE CLINIC | Age: 62
End: 2022-05-19
Payer: MEDICARE

## 2022-05-19 VITALS
HEIGHT: 73 IN | OXYGEN SATURATION: 98 % | BODY MASS INDEX: 28.89 KG/M2 | HEART RATE: 87 BPM | DIASTOLIC BLOOD PRESSURE: 78 MMHG | SYSTOLIC BLOOD PRESSURE: 124 MMHG | WEIGHT: 218 LBS

## 2022-05-19 DIAGNOSIS — S80.12XD CONTUSION OF LEFT LOWER LEG, SUBSEQUENT ENCOUNTER: Primary | ICD-10-CM

## 2022-05-19 DIAGNOSIS — N52.8 OTHER MALE ERECTILE DYSFUNCTION: ICD-10-CM

## 2022-05-19 DIAGNOSIS — S81.812D LACERATION OF LEFT LOWER LEG, SUBSEQUENT ENCOUNTER: ICD-10-CM

## 2022-05-19 PROCEDURE — 99213 OFFICE O/P EST LOW 20 MIN: CPT | Performed by: FAMILY MEDICINE

## 2022-05-19 PROCEDURE — 3017F COLORECTAL CA SCREEN DOC REV: CPT | Performed by: FAMILY MEDICINE

## 2022-05-19 PROCEDURE — G8427 DOCREV CUR MEDS BY ELIG CLIN: HCPCS | Performed by: FAMILY MEDICINE

## 2022-05-19 PROCEDURE — G8419 CALC BMI OUT NRM PARAM NOF/U: HCPCS | Performed by: FAMILY MEDICINE

## 2022-05-19 PROCEDURE — 1036F TOBACCO NON-USER: CPT | Performed by: FAMILY MEDICINE

## 2022-05-19 RX ORDER — TADALAFIL 2.5 MG/1
TABLET ORAL
Qty: 30 TABLET | Refills: 2 | Status: SHIPPED | OUTPATIENT
Start: 2022-05-19 | End: 2022-08-25 | Stop reason: SDUPTHER
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
## 2022-06-17 ENCOUNTER — OFFICE VISIT (OUTPATIENT)
Dept: CARDIOLOGY CLINIC | Age: 62
End: 2022-06-17
Payer: MEDICARE

## 2022-06-17 VITALS
DIASTOLIC BLOOD PRESSURE: 80 MMHG | WEIGHT: 215.4 LBS | SYSTOLIC BLOOD PRESSURE: 110 MMHG | BODY MASS INDEX: 28.42 KG/M2 | HEART RATE: 74 BPM

## 2022-06-17 DIAGNOSIS — R93.1 ABNORMAL ECHOCARDIOGRAM: ICD-10-CM

## 2022-06-17 DIAGNOSIS — E78.5 HYPERLIPIDEMIA, UNSPECIFIED HYPERLIPIDEMIA TYPE: ICD-10-CM

## 2022-06-17 DIAGNOSIS — I50.22 CHRONIC SYSTOLIC CONGESTIVE HEART FAILURE (HCC): Primary | ICD-10-CM

## 2022-06-17 DIAGNOSIS — I42.0 DILATED CARDIOMYOPATHY (HCC): ICD-10-CM

## 2022-06-17 DIAGNOSIS — I48.0 PAROXYSMAL ATRIAL FIBRILLATION (HCC): ICD-10-CM

## 2022-06-17 PROCEDURE — 1036F TOBACCO NON-USER: CPT | Performed by: INTERNAL MEDICINE

## 2022-06-17 PROCEDURE — G8427 DOCREV CUR MEDS BY ELIG CLIN: HCPCS | Performed by: INTERNAL MEDICINE

## 2022-06-17 PROCEDURE — 99214 OFFICE O/P EST MOD 30 MIN: CPT | Performed by: INTERNAL MEDICINE

## 2022-06-17 PROCEDURE — G8419 CALC BMI OUT NRM PARAM NOF/U: HCPCS | Performed by: INTERNAL MEDICINE

## 2022-06-17 PROCEDURE — 3017F COLORECTAL CA SCREEN DOC REV: CPT | Performed by: INTERNAL MEDICINE

## 2022-06-17 ASSESSMENT — ENCOUNTER SYMPTOMS
ABDOMINAL DISTENTION: 0
APNEA: 0
COUGH: 0
SHORTNESS OF BREATH: 0
CHOKING: 0
CHEST TIGHTNESS: 0

## 2022-06-17 NOTE — PROGRESS NOTES
Subjective:      Patient ID: Flora Alexis is a 63 y.o. male. Congestive Heart Failure  Pertinent negatives include no chest pain, fatigue, palpitations or shortness of breath. Follow up for abnecho/cardiomyopathy/hyperlipid/afib. Sinus issues/congestion. Remains active. No exertional sx. Remains stable. Still with cough. No sob. Rhythm stable. No syncope. No edema. No pnd/orthopnea. Some palps few days ago. After allergy meds. Past Medical History:   Diagnosis Date    Allergic rhinitis     Anesthesia complication     INCREASED HEART RATE AFTER PORT PLACEMENT    Arthritis     Atrial fibrillation [facility])     2 episodes:paroxysmal:prior cardiologist.    Coronary atherosclerosis 6/24/2015    Depression     under care of psychiatry    Dilated cardiomyopathy ([facility] [address]     Hodgkin's disease ([facility] [address]     2000 hodgkins stage 4:Dr. Elenita Benítez    Hyperlipidemia     Neuropathy     Prolonged emergence from general anesthesia     X1    S/P colonoscopy 12/27/2019    Dr. Bj Box drug overdose, intentional self-harm, initial encounter 1/31/2022    Tendonitis     Testicular pain, left 8/21/2012    Viral pharyngitis 11/27/2019    Vitamin D deficiency      Past Surgical History:   Procedure Laterality Date    COLONOSCOPY  2009    Nml per pt'.     ENDOSCOPY, COLON, DIAGNOSTIC      HERNIA REPAIR      2    KNEE SURGERY Right     OTHER SURGICAL HISTORY Left     thumb surgery    REPLACEMENT SHOULDER TOTAL Left 10/3/2019    LEFT TOTAL SHOULDER ARTHROPLASTY WITH INTERSCALENE BLOCK FOR PAIN CONTROL   [facility] performed by Devin Horne MD at [address] [facility] ARTHROSCOPY      SHOULDER SURGERY Bilateral     rt shoulder LABRUM ROTATOR CUFF, LEFT ARTHROSCOPY    TUNNELED VENOUS PORT PLACEMENT      REMOVED     Social History     Socioeconomic History    Marital status:      Spouse name: Not on file    Number of children: Not on file    Years of education: Not on file    cardiac issues. Vitals:    06/17/22 0905   BP: 110/80   Pulse: 74       Wt 215        Review of Systems   Constitutional: Negative for activity change and fatigue. Respiratory: Negative for apnea, cough, choking, chest tightness and shortness of breath. Cardiovascular: Negative for chest pain, palpitations and leg swelling. No PND or orthopnea. No tachycardia. Gastrointestinal: Negative for abdominal distention. Musculoskeletal: Negative for myalgias. Neurological: Negative for dizziness, syncope and light-headedness. Psychiatric/Behavioral: Negative for agitation, behavioral problems and confusion. All other systems reviewed and are negative. Objective:   Physical Exam  Constitutional:       General: He is not in acute distress. Appearance: He is well-developed. HENT:      Head: Normocephalic and atraumatic. Eyes:      General:         Right eye: No discharge. Left eye: No discharge. Neck:      Vascular: No JVD. Cardiovascular:      Rate and Rhythm: Normal rate and regular rhythm. Heart sounds: Normal heart sounds. No gallop. Pulmonary:      Effort: Pulmonary effort is normal. No respiratory distress. Breath sounds: Normal breath sounds. No stridor. No wheezing or rales. Abdominal:      General: Bowel sounds are normal.      Palpations: Abdomen is soft. Tenderness: There is no abdominal tenderness. Musculoskeletal:         General: Normal range of motion. Cervical back: Normal range of motion and neck supple. Skin:     General: Skin is warm and dry. Neurological:      Mental Status: He is alert and oriented to person, place, and time. Psychiatric:         Behavior: Behavior normal.         Thought Content: Thought content normal.         Assessment:       Diagnosis Orders   1. Chronic systolic congestive heart failure ([address]     2. Dilated cardiomyopathy (HCC)     3. Paroxysmal atrial fibrillation ([address]     4.  Abnormal echocardiogram     5. Hyperlipidemia, unspecified hyperlipidemia type             Plan:      CV stable. He gives hx afib yrs ago. Rhythm stable. Compensated. No angina. BP good here. Reviewed previous records and testing including echo 9/19 and cath 10/19. Will continue Losartan 25 mg daily and toprol 25 mg qd for CHF/cmyop/afib. No changes. Will have echo. Continue to monitor. Follow up 3 months.          Marleny Figueredo MD

## 2022-06-22 ENCOUNTER — PROCEDURE VISIT (OUTPATIENT)
Dept: CARDIOLOGY CLINIC | Age: 62
End: 2022-06-22
Payer: MEDICARE

## 2022-06-22 DIAGNOSIS — I50.32 DIASTOLIC CHF, CHRONIC (HCC): ICD-10-CM

## 2022-06-22 LAB
LV EF: 55 %
LVEF MODALITY: NORMAL

## 2022-06-22 PROCEDURE — 93306 TTE W/DOPPLER COMPLETE: CPT | Performed by: INTERNAL MEDICINE

## 2022-06-29 DIAGNOSIS — I50.32 DIASTOLIC CHF, CHRONIC (HCC): Primary | ICD-10-CM

## 2022-06-29 RX ORDER — LOSARTAN POTASSIUM 25 MG/1
25 TABLET ORAL DAILY
Qty: 90 TABLET | Refills: 3 | Status: SHIPPED | OUTPATIENT
Start: 2022-06-29

## 2022-06-29 NOTE — TELEPHONE ENCOUNTER
Medication:   Requested Prescriptions     Pending Prescriptions Disp Refills    losartan (COZAAR) 25 MG tablet 90 tablet 3     Sig: Take 1 tablet by mouth daily     Last Filled: 4.30.21 #90 refills 3  Last appt: 5/19/2022   Next appt: Visit date not found    Last OARRS:   RX Monitoring 9/29/2020   Periodic Controlled Substance Monitoring Possible medication side effects, risk of tolerance/dependence & alternative treatments discussed. ;No signs of potential drug abuse or diversion identified.

## 2022-08-09 ENCOUNTER — OFFICE VISIT (OUTPATIENT)
Dept: FAMILY MEDICINE CLINIC | Age: 62
End: 2022-08-09
Payer: MEDICARE

## 2022-08-09 VITALS
TEMPERATURE: 98.3 F | SYSTOLIC BLOOD PRESSURE: 135 MMHG | HEART RATE: 81 BPM | HEIGHT: 73 IN | WEIGHT: 218.2 LBS | BODY MASS INDEX: 28.92 KG/M2 | RESPIRATION RATE: 16 BRPM | DIASTOLIC BLOOD PRESSURE: 89 MMHG | OXYGEN SATURATION: 98 %

## 2022-08-09 DIAGNOSIS — M17.0 ARTHRITIS OF BOTH KNEES: Primary | ICD-10-CM

## 2022-08-09 PROCEDURE — 99213 OFFICE O/P EST LOW 20 MIN: CPT | Performed by: FAMILY MEDICINE

## 2022-08-09 NOTE — PROGRESS NOTES
Chief Complaint: Knee Pain (bilateral knee painful ROM/FE/BS; progressing 3-4 weeks)       HPI:  Milton Mast is a 63 y.o. male here with c/o bilateral knee pain ongoing since 1 month. He has been experiencing stiffness in his joints in the early morning and when he gets down or gets up the stairs. He has also noticed swelling at the back of his knee. It is holding off from doing his regular activities. And he has not been able to exercise. ROS:  Constitutional: Negative  Respiratory: Negative for cough, chest tightness, shortness of breath and wheezing. Cardiovascular: Negative for chest pain, palpitations and leg swelling. Gastrointestinal: Negative   Genitourinary: Negative   Musculoskeletal: As mentioned above    Patient's problem list, medications, allergies, past medical, surgical, social and family histories were reviewed and updated as appropriate. Current Outpatient Medications   Medication Sig Dispense Refill    losartan (COZAAR) 25 MG tablet Take 1 tablet by mouth daily 90 tablet 3    Tadalafil 2.5 MG TABS Take 1 tab po qd prn erectile dysfunction 30 tablet 2    metoprolol succinate (TOPROL XL) 25 MG extended release tablet TAKE 1 TABLET BY MOUTH  DAILY 90 tablet 3    lidocaine (LIDODERM) 5 % Place 1 patch onto the skin every 24 hours      naloxone 4 MG/0.1ML LIQD nasal spray 1 spray by Nasal route as needed      vitamin D (ERGOCALCIFEROL) 1.25 MG (97933 UT) CAPS capsule TAKE 1 CAPSULE BY MOUTH  ONCE WEEKLY 13 capsule 3    gabapentin (NEURONTIN) 300 MG capsule Take 1 capsule by mouth 3 times daily.  270 capsule 3    atorvastatin (LIPITOR) 80 MG tablet Take 1 tablet by mouth daily 90 tablet 1    albuterol (PROVENTIL) (2.5 MG/3ML) 0.083% nebulizer solution Take 3 mLs by nebulization every 6 hours as needed for Wheezing 120 each 3    sertraline (ZOLOFT) 100 MG tablet TAKE 1 TABLET BY MOUTH  DAILY 90 tablet 3    fluticasone (FLONASE) 50 MCG/ACT nasal spray USE 1 SPRAY IN EACH NOSTRIL DAILY 16 g 5    buPROPion (WELLBUTRIN XL) 300 MG extended release tablet TAKE 1 TABLET DAILY 90 tablet 2    aspirin EC 81 MG EC tablet Take 1 tablet by mouth daily 90 tablet 3     No current facility-administered medications for this visit. Social History     Tobacco Use    Smoking status: Never    Smokeless tobacco: Never   Substance Use Topics    Alcohol use: Yes     Alcohol/week: 0.0 standard drinks     Comment: drinks daily, drinks rum and diet about 1/3 of a bottle daily, occasionally beer        Objective:     Vitals:    08/09/22 1652   BP: 135/89   Site: Left Upper Arm   Position: Sitting   Cuff Size: Large Adult   Pulse: 81   Resp: 16   Temp: 98.3 °F (36.8 °C)   TempSrc: Temporal   SpO2: 98%   Weight: 218 lb 3.2 oz (99 kg)   Height: 6' 1\" (1.854 m)     Body mass index is 28.79 kg/m². Wt Readings from Last 3 Encounters:   08/09/22 218 lb 3.2 oz (99 kg)   06/17/22 215 lb 6.4 oz (97.7 kg)   05/19/22 218 lb (98.9 kg)     BP Readings from Last 3 Encounters:   08/09/22 135/89   06/17/22 110/80   05/19/22 124/78       Physical exam:  Constitutional: he is oriented to person, place, and time. he appears well-developed and well-nourished. No distress. Neck: Normal range of motion. No JVD present. No tracheal deviation present. No thyromegaly present. Cardiovascular: Normal rate, regular rhythm, normal heart sounds and intact distal pulses. No murmur heard. Pulmonary/Chest: Effort normal and breath sounds normal. No stridor. No respiratory distress. he has no wheezes. he has no rales. heexhibits no tenderness. Abdominal: Soft. Bowel sounds are normal. he exhibits no distension and no mass. There is no tenderness. There is no rebound and no guarding. Musculoskeletal: Bilateral knee crepitus felt mild joint swelling noted and normal range of motion    Assessment/Plan:   1.  Arthritis of both knees  Advised to take NSAIDs only if needed  Recommended to see  - Garfield Sanders MD, Orthopedic Surgery, Jacklyn Barrow MD  8/10/2022 6:52 AM

## 2022-08-25 DIAGNOSIS — N52.8 OTHER MALE ERECTILE DYSFUNCTION: ICD-10-CM

## 2022-08-25 RX ORDER — TADALAFIL 2.5 MG/1
TABLET ORAL
Qty: 30 TABLET | Refills: 5 | Status: SHIPPED | OUTPATIENT
Start: 2022-08-25

## 2022-08-25 NOTE — TELEPHONE ENCOUNTER
Last OV 8/9/2022   Next OV Visit date not found    Requested Prescriptions     Pending Prescriptions Disp Refills    Tadalafil 2.5 MG TABS 30 tablet 2     Sig: Take 1 tab po qd prn erectile dysfunction      Last fill 5/22  Medication pended

## 2022-10-13 ENCOUNTER — OFFICE VISIT (OUTPATIENT)
Dept: CARDIOLOGY CLINIC | Age: 62
End: 2022-10-13

## 2022-10-13 VITALS
SYSTOLIC BLOOD PRESSURE: 124 MMHG | WEIGHT: 222 LBS | HEART RATE: 68 BPM | DIASTOLIC BLOOD PRESSURE: 60 MMHG | BODY MASS INDEX: 29.29 KG/M2

## 2022-10-13 DIAGNOSIS — E78.5 HYPERLIPIDEMIA, UNSPECIFIED HYPERLIPIDEMIA TYPE: ICD-10-CM

## 2022-10-13 DIAGNOSIS — R93.1 ABNORMAL ECHOCARDIOGRAM: ICD-10-CM

## 2022-10-13 DIAGNOSIS — I50.22 CHRONIC SYSTOLIC CONGESTIVE HEART FAILURE (HCC): Primary | ICD-10-CM

## 2022-10-13 DIAGNOSIS — I42.0 DILATED CARDIOMYOPATHY (HCC): ICD-10-CM

## 2022-10-13 DIAGNOSIS — I48.0 PAROXYSMAL ATRIAL FIBRILLATION (HCC): ICD-10-CM

## 2022-10-13 PROCEDURE — 99214 OFFICE O/P EST MOD 30 MIN: CPT | Performed by: INTERNAL MEDICINE

## 2022-10-13 ASSESSMENT — ENCOUNTER SYMPTOMS
CHOKING: 0
ABDOMINAL DISTENTION: 0
APNEA: 0
COUGH: 0
CHEST TIGHTNESS: 0
SHORTNESS OF BREATH: 0

## 2022-10-13 NOTE — PROGRESS NOTES
Subjective:      Patient ID: Rita Talamantes is a 63 y.o. male. Follow up for abn echo/cardiomyopathy/hyperlipid/afib. Sinus issues/congestion. Remains active. Knees with arthritis, had injection, improve. No exertional sx. Remains stable. Still with cough. No sob. Rhythm stable. No syncope. No edema. No pnd/orthopnea. No further palps. Past Medical History:   Diagnosis Date    Allergic rhinitis     Anesthesia complication     INCREASED HEART RATE AFTER PORT PLACEMENT    Arthritis     Atrial fibrillation [facility])     2 episodes:paroxysmal:prior cardiologist.    Coronary atherosclerosis 6/24/2015    Depression     under care of psychiatry    Dilated cardiomyopathy ([facility]     Hodgkin's disease ([facility] [address]     2000 hodgkins stage 4:Dr. Kalani Robles    Hyperlipidemia     Neuropathy     Prolonged emergence from general anesthesia     X1    S/P colonoscopy 12/27/2019    Dr. Jarett Malloy drug overdose, intentional self-harm, initial encounter 1/31/2022    Tendonitis     Testicular pain, left 8/21/2012    Viral pharyngitis 11/27/2019    Vitamin D deficiency      Past Surgical History:   Procedure Laterality Date    COLONOSCOPY  2009    Nml per pt'.     ENDOSCOPY, COLON, DIAGNOSTIC      HERNIA REPAIR      2    KNEE SURGERY Right     OTHER SURGICAL HISTORY Left     thumb surgery    REPLACEMENT SHOULDER TOTAL Left 10/3/2019    LEFT TOTAL SHOULDER ARTHROPLASTY WITH INTERSCALENE BLOCK FOR PAIN CONTROL   [facility] performed by Vicotr Manuel Harris MD at [id number] [facility] ARTHROSCOPY      SHOULDER SURGERY Bilateral     rt shoulder LABRUM ROTATOR CUFF, LEFT ARTHROSCOPY    TUNNELED VENOUS PORT PLACEMENT      REMOVED     Social History     Socioeconomic History    Marital status:      Spouse name: Not on file    Number of children: Not on file    Years of education: Not on file    Highest education level: Not on file   Occupational History    Occupation: [employer]   Tobacco Use    Smoking status: Never Smokeless tobacco: Never   Vaping Use    Vaping Use: Never used   Substance and Sexual Activity    Alcohol use: Yes     Alcohol/week: 0.0 standard drinks     Comment: drinks daily, drinks rum and diet about 1/3 of a bottle daily, occasionally beer    Drug use: No    Sexual activity: Yes     Comment: - 3 children    Other Topics Concern    Not on file   Social History Narrative    Not on file     Social Determinants of Health     Financial Resource Strain: Low Risk     Difficulty of Paying Living Expenses: Not hard at all   Food Insecurity: No Food Insecurity    Worried About [address] in the Last Year: Never true    [address] [employer] in the Last Year: Never true   Transportation Needs: No Transportation Needs    Lack of Transportation (Medical): No    Lack of Transportation (Non-Medical): No   Physical Activity: Inactive    Days of Exercise per Week: 0 days    Minutes of Exercise per Session: 0 min   Stress: No Stress Concern Present    Feeling of Stress : Not at all   Social Connections: Socially Integrated    Frequency of Communication with Friends and Family: More than three times a week    Frequency of Social Gatherings with Friends and Family: Three times a week    Attends Voodoo Services: More than 4 times per year    Active Member of Clubs or Organizations: Yes    Attends Club or Organization Meetings: More than 4 times per year    Marital Status:    Intimate Partner Violence: Not on file   Housing Stability: Low Risk     Unable to Pay for Housing in the Last Year: No    Number of [place] in the Last Year: 1    Unstable Housing in the Last Year: No     FH reviewed,  No FH cardiac issues. Vitals:    10/13/22 0937   BP: 124/60   Pulse: 68       Wt 222 ( up 7 lbs)        Review of Systems   Constitutional:  Negative for activity change and fatigue. Respiratory:  Negative for apnea, cough, choking, chest tightness and shortness of breath.     Cardiovascular:  Negative for chest pain, palpitations and leg swelling. No PND or orthopnea. No tachycardia. Gastrointestinal:  Negative for abdominal distention. Musculoskeletal:  Negative for myalgias. Neurological:  Negative for dizziness, syncope and light-headedness. Psychiatric/Behavioral:  Negative for agitation, behavioral problems and confusion. All other systems reviewed and are negative. Objective:   Physical Exam  Constitutional:       General: He is not in acute distress. Appearance: He is well-developed. HENT:      Head: Normocephalic and atraumatic. Eyes:      General:         Right eye: No discharge. Left eye: No discharge. Neck:      Vascular: No JVD. Cardiovascular:      Rate and Rhythm: Normal rate and regular rhythm. Heart sounds: Normal heart sounds. No gallop. Pulmonary:      Effort: Pulmonary effort is normal. No respiratory distress. Breath sounds: Normal breath sounds. No stridor. No wheezing or rales. Abdominal:      General: Bowel sounds are normal.      Palpations: Abdomen is soft. Tenderness: There is no abdominal tenderness. Musculoskeletal:         General: Normal range of motion. Cervical back: Normal range of motion and neck supple. Skin:     General: Skin is warm and dry. Neurological:      Mental Status: He is alert and oriented to person, place, and time. Psychiatric:         Behavior: Behavior normal.         Thought Content: Thought content normal.       Assessment:       Diagnosis Orders   1. Chronic systolic congestive heart failure ([address]        2. Dilated cardiomyopathy (HCC)        3. Paroxysmal atrial fibrillation ([address]        4. Abnormal echocardiogram        5. Hyperlipidemia, unspecified hyperlipidemia type                Plan:      CV stable. He gives hx afib yrs ago. Rhythm stable. Compensated. No angina. BP good.   Reviewed previous records and testing including echo 9/19 and cath 10/19 and echo 6/22 with normal LV function. Will continue Losartan 25 mg daily and toprol 25 mg qd for CHF/cmyop/afib. No changes. Continue to monitor. Follow up 3 months.          Janes Mondragon MD

## 2022-10-18 DIAGNOSIS — F33.0 MILD EPISODE OF RECURRENT MAJOR DEPRESSIVE DISORDER (HCC): ICD-10-CM

## 2022-10-18 DIAGNOSIS — E78.00 PURE HYPERCHOLESTEROLEMIA: ICD-10-CM

## 2022-10-19 RX ORDER — SERTRALINE HYDROCHLORIDE 100 MG/1
TABLET, FILM COATED ORAL
Qty: 90 TABLET | Refills: 3 | OUTPATIENT
Start: 2022-10-19

## 2022-10-19 RX ORDER — ATORVASTATIN CALCIUM 80 MG/1
80 TABLET, FILM COATED ORAL DAILY
Qty: 90 TABLET | Refills: 3 | Status: SHIPPED | OUTPATIENT
Start: 2022-10-19

## 2022-10-19 NOTE — TELEPHONE ENCOUNTER
Medication:   Requested Prescriptions     Pending Prescriptions Disp Refills    atorvastatin (LIPITOR) 80 MG tablet [Pharmacy Med Name: Atorvastatin Calcium 80 MG Oral Tablet] 90 tablet 3     Sig: TAKE 1 TABLET BY MOUTH  DAILY       Last Filled:      Patient Phone Number: [phone] (home)     Last appt: 8/9/2022   Next appt: Visit date not found    Last Lipid: 2/21/22  Lab Results   Component Value Date/Time    CHOL 246 02/21/2022 12:05 PM    TRIG 186 02/21/2022 12:05 PM    HDL 57 02/21/2022 12:05 PM    HDL 73 02/13/2010 11:30 AM    LDLCALC 152 02/21/2022 12:05 PM

## 2022-11-09 ENCOUNTER — OFFICE VISIT (OUTPATIENT)
Dept: FAMILY MEDICINE CLINIC | Age: 62
End: 2022-11-09
Payer: MEDICARE

## 2022-11-09 VITALS
BODY MASS INDEX: 29.29 KG/M2 | WEIGHT: 221 LBS | HEIGHT: 73 IN | SYSTOLIC BLOOD PRESSURE: 122 MMHG | OXYGEN SATURATION: 97 % | HEART RATE: 87 BPM | DIASTOLIC BLOOD PRESSURE: 70 MMHG

## 2022-11-09 DIAGNOSIS — M25.511 CHRONIC RIGHT SHOULDER PAIN: Primary | ICD-10-CM

## 2022-11-09 DIAGNOSIS — G89.29 CHRONIC RIGHT SHOULDER PAIN: Primary | ICD-10-CM

## 2022-11-09 DIAGNOSIS — G62.9 NEUROPATHY: ICD-10-CM

## 2022-11-09 DIAGNOSIS — N52.8 OTHER MALE ERECTILE DYSFUNCTION: ICD-10-CM

## 2022-11-09 DIAGNOSIS — F32.2 SEVERE SINGLE CURRENT EPISODE OF MAJOR DEPRESSIVE DISORDER, WITHOUT PSYCHOTIC FEATURES (HCC): ICD-10-CM

## 2022-11-09 PROCEDURE — 99214 OFFICE O/P EST MOD 30 MIN: CPT | Performed by: FAMILY MEDICINE

## 2022-11-09 NOTE — PROGRESS NOTES
Chief complaint: Knee Pain (Left knee), Shoulder Pain (Right numbness), and Arthritis (Right hand )      SUBJECTIVE:  JEOVANY Talamantes (:  1960) is a 63 y.o. male with a past medical history of cardiomyopathy who presents with a chief complaint of: left knee pain, right shoulder pain, Right hand arthritis and low sex drive. Pain in left knee-  not bothering right now; was hurting for a week in the back of the knee; [facility]. Got injections that has helped significantly in the past. Not worried about this anymore. Pain in right shoulder- hx of labral repair with Dr. Opal Rod 8-9yrs ago. Gets pain and numbness that travels down arm to right thumb. He had crush injury to right nail bed s/p surgical correction with ortho at [facility]    ED- inability to maintain erection. Tadalafil 2.5mg works but sometimes his wife isn't in the mood. Not as strong of interest in sex. Also hasn't enjoyed his hobbies. Interest in stuff has wained. Likes to fish but hasn't fished once this year. Used to hunt but physical 'stuff' has made that impossible. Hx of MDD- on zoloft 100mg daily. Hasn't seen psychiatrist for years; was at a place in [facility] for a week because of 'that one incident.' Was 'cut off from the clinic because he missed the appointment.' Then he gave up with follow up. He hasn't had any issues since with 'stupid stuff.' He hasn't had any suicidal thoughts- the medicine has kept him from doing 'anything stupid.'    Cardiologist wants him to lose 20lbs. Also asking about advertisements for neuropathy. Something you put on the feet.  ?TENS unit    Review of Systems:  General: No F/C/NS/fatigue/wt loss   Cardiovascular: No CP  Respiratory: No SOB  GI: No N/V/D/C/abd pain/blood in stool  Neuro: No HA/weakness  Psych: No depressed mood/anxiety  Musculoskeletal: No myalgias    Patient Active Problem List   Diagnosis    Cardiomyopathy ([facility] [address]    Shoulder arthritis    Status post total shoulder replacement, left    Coronary atherosclerosis    Diastolic CHF, chronic (HCC)    Hodgkin's disease, nodular sclerosis, of lymph nodes of head, face, and neck (HCC)    Hyperlipidemia    Left bundle branch block (LBBB) on electrocardiogram    Paroxysmal atrial fibrillation (HCC)    Severe single current episode of major depressive disorder, without psychotic features ([facility] [address]    Suicide attempt ([facility] [address]    Abnormal thyroid function test    Abnormal stress electrocardiogram test    Neuropathy    Male erectile dysfunction    Vitamin D deficiency    Chronic right shoulder pain     Past Medical History:   Diagnosis Date    Allergic rhinitis     Anesthesia complication     INCREASED HEART RATE AFTER PORT PLACEMENT    Arthritis     Atrial fibrillation ([facility] [address]     2 episodes:paroxysmal:prior cardiologist.    Coronary atherosclerosis 6/24/2015    Depression     under care of psychiatry    Dilated cardiomyopathy ([facility] [address]     [facility]'s [facility] ([facility]     2000 hodgkins stage 4:Dr. Hoda Levy    Hyperlipidemia     Neuropathy     Prolonged emergence from general anesthesia     X1    S/P colonoscopy 12/27/2019    Dr. Stone Peterson drug overdose, intentional self-harm, initial encounter 1/31/2022    Tendonitis     Testicular pain, left 8/21/2012    Viral pharyngitis 11/27/2019    Vitamin D deficiency      Current Outpatient Medications on File Prior to Visit   Medication Sig Dispense Refill    atorvastatin (LIPITOR) 80 MG tablet TAKE 1 TABLET BY MOUTH  DAILY 90 tablet 3    Tadalafil 2.5 MG TABS Take 1 tab po qd prn erectile dysfunction 30 tablet 5    losartan (COZAAR) 25 MG tablet Take 1 tablet by mouth daily 90 tablet 3    metoprolol succinate (TOPROL XL) 25 MG extended release tablet TAKE 1 TABLET BY MOUTH  DAILY 90 tablet 3    vitamin D (ERGOCALCIFEROL) 1.25 MG (30423 UT) CAPS capsule TAKE 1 CAPSULE BY MOUTH  ONCE WEEKLY 13 capsule 3    gabapentin (NEURONTIN) 300 MG capsule Take 1 capsule by mouth 3 times daily.  270 capsule 3 sertraline (ZOLOFT) 100 MG tablet TAKE 1 TABLET BY MOUTH  DAILY 90 tablet 3    fluticasone (FLONASE) 50 MCG/ACT nasal spray USE 1 SPRAY IN EACH NOSTRIL DAILY 16 g 5    aspirin EC 81 MG EC tablet Take 1 tablet by mouth daily 90 tablet 3     No current facility-administered medications on file prior to visit. OBJECTIVE:  /70 (Site: Right Upper Arm, Position: Sitting, Cuff Size: Medium Adult)   Pulse 87   Ht 6' 1\" (1.854 m)   Wt 221 lb (100.2 kg)   SpO2 97%   BMI 29.16 kg/m²      O:  afebrile, vitals reviewed  Gen:  WD, WN, NAD, A&Ox3, pleasant     Shoulder exam [x] Right [] Left:  (A) Inspection: No swelling, deformities, erythema, or atrophy  (B) ROM: reduced abduction (90deg), normal adduction (45deg), reduced forward flexion (135deg), normal ER (40-50deg), reduced IR: back scratch to T4   (C) Palpation: No TTP sternoclavicular joint, along clavicle, AC joint, scapula, subacromial bursa, or bicipital groove   (D) Neurovascular: 2+ radial pulse, intact sensation to multiple dermatomes distally, <2 sec distal capillary refill  (E) Strength: No asymmetrical weakness with eccentric load of rotator cuff; 5/5 supraspinatous, 5/5 Infraspinatous/Teres Minor, and 5/5 Subscapularis   (F) Provocative tests:   Impingement Tests: Hawkin's - no pain. Neer's - no pain. Biceps Tendon: Speeds - no pain. Sulcus sign - minimal and symmetrical  +phalen maneuver on right    ASSESSMENT/PLAN:  1. Chronic right shoulder pain  Chronic, uncontrolled. Trial of PT prior to going back to ortho for possible MRI and surgical management. Consider CTS if neuropathy in hand persists and shoulder pain improves. Would need EMG/NCV for further work up  Pt agrees  -     [id number] [facility]    2. Male erectile dysfunction  Chronic, stable on tadalafil. Decreased libido could be related to extra adipose on body. Pt wonders if weight loss would help. Yes, I believe it could.  He will try exercise and weight loss first, especially since his cardiologist wants him to lose 20lbs as well. 3. Severe single current episode of major depressive disorder, without psychotic features (HCC)  Chronic, stable. Could also be contributing to anhedonia and decreased libido. Exercise plan as above. Agree with pt's hesitation to change this medicine given hx of suicide attempt prior to SSRI therapy. 4. Neuropathy  Will message about what he saw on tv regarding neuropathy. Return in about 1 month (around 12/9/2022). Electronically signed by Hector Zelaya MD on 11/9/2022 at 11:41 AM.     Please note, portions of this note were completed with a voice recognition program.  Although every effort was made to ensure the accuracy of this automated transcription, some errors in transcription may have occurred.

## 2022-12-02 ENCOUNTER — HOSPITAL ENCOUNTER (OUTPATIENT)
Dept: PHYSICAL THERAPY | Age: 62
Setting detail: THERAPIES SERIES
Discharge: HOME | End: 2022-12-02
Payer: MEDICARE

## 2022-12-02 PROCEDURE — 97161 PT EVAL LOW COMPLEX 20 MIN: CPT

## 2022-12-02 PROCEDURE — 97530 THERAPEUTIC ACTIVITIES: CPT

## 2022-12-02 NOTE — PLAN OF CARE
[facility] [place]lee [place]av[place], [address] [facility]  Phone: [phone]  Fax: [phone]         Physical Therapy Certification    Dear Devang Holman MD,    We had the pleasure of evaluating the following patient for physical therapy services at [address] [place] [address]. A summary of our findings can be found in the initial assessment below. This includes our plan of care. If you have any questions or concerns regarding these findings, please do not hesitate to contact me at the office phone number checked above. Thank you for the referral.       Physician Signature:_______________________________Date:__________________  By signing above (or electronic signature), therapists plan is approved by physician      Patient: Carlin Ramon   : 1960   MRN: [id number]  Referring Physician:  Devang Holman MD      Evaluation Date: 2022      Medical Diagnosis Information:  Diagnosis: M25.511, G89.29 (ICD-10-CM) - Chronic right shoulder pain   Treatment Diagnosis: Decreased Right Shoulder AROM & Strength                                         Insurance information: PT Insurance Information: [facility] Medicare ([facility])    Precautions/ Contra-indications:     C-SSRS Triggered by Intake questionnaire (Past 2 wk assessment ):   [x] No, Questionnaire did not trigger screening.   [] Yes, Patient intake triggered C-SSRS Screening      [] C-SSRS Screening completed  [] PCP notified via Epic    Latex Allergy:  [x]NO      []YES  Preferred Language for Healthcare:   [x]English       []other:    SUBJECTIVE: Patient stated complaint: Pt presents with c/o right shoulder pain. Had labral repair 7-8 years ago. Having pain and N/T. Rates his pain a 7/10. Has numbness from shoulder into right thumb and finger that has been going for the last couple months.  Pain is affecting his ability to sleep. Pt is right hand dominant. Right arm feels weak. Pt is retired but does help his friend at his work where is uses a  for multiple hours per day that is extremely powerful. At times develops neck pain when using the .      Relevant Medical History: Previous Right Shoulder Labral Repair 7-8 years ago, Left Total Shoulder Replacement, A-Fib, Depression, Hodgkin's Disease   Functional Outcome: FOTO     Pain Scale: 7/10  Easing factors: rest   Provocative factors: overhead lifting      Type: [x]Constant   []Intermittent  []Radiating []Localized []other:     Numbness/Tingling: Right UE into thumb and fingers     Occupation/School: works for a [employer] Status/Prior Level of Function: Prior to this injury / incident, pt was independent with ADLs and IADLs,       OBJECTIVE:   Hand dominance: Right hand dominant     Palpation: Not overly TTP     Functional Mobility/Transfers: Independent     Bandages/Dressings/Incisions: NA      Dermatomes Normal Abnormal Comments   Top of head (C1) x     Posterior occipital region (C2) x     Side of neck (C3) x     Top of shoulder (C4)  x    Lateral deltoid (C5)  x    Tip of thumb (C6)  x    Distal middle finger (C7)  x    Distal fifth finger (C8)  x    Medial forearm (T1)  x        Reflexes Normal Abnormal Comments   C5-6 Biceps      C5-6 Brachioradialis      C7-8 Triceps      Nixons           PROM AROM    L R L R   Cervical Flexion        Cervical Extension        Cervical Rotation       Cervical Side-bend       Shoulder Flexion    157 degrees 142 degrees    Shoulder Abduction    145 degrees  110 degrees    Shoulder External Rotation    30 degrees 40 degrees   Shoulder Internal Rotation    40 degrees  38 degrees   Elbow Flexion        Elbow Extension        Pronation        Supination        Wrist Flexion        Wrist Extension        Radial Deviation        Ulnar Deviation            Strength (0-5) Left Right    Shoulder Shrug (C4) Shoulder Flex 4+/5 3+/5   Shoulder Abd (C5) 4+/5 4-/5   Shoulder ER 4+/5 3+/5   Shoulder IR 4+/5 3+/5   Biceps (C6) 4+/5 4+/5   Triceps (C7) 4+/5 4+/5   Lats     Middle Trap     Rhomboids     Lower Trap      Pronation      Supination      Wrist Flex (C7)     Wrist Ext (C6)     Wrist Radial Deviation     Wrist Ulnar Deviation                    Joint mobility:    []Normal    []Hypo   []Hyper      Orthopaedic Special Tests Positive  Negative  NT Comments    Shoulder        Neer        Jacques-Wong       Empty Can       Drop Arm       [place]'s       Speeds        Sulcus        Apprehension (Anterior / Posterior)       Load and Shift              Elbow        Cozen's       Varus Stress       Valgus Stress        Tinel's                                                [x] Patient history, allergies, meds reviewed. Medical chart reviewed. See intake form. Review Of Systems (ROS):  [x]Performed Review of systems (Integumentary, CardioPulmonary, Neurological) by intake and observation. Intake form has been scanned into medical record. Patient has been instructed to contact their primary care physician regarding ROS issues if not already being addressed at this time.       Co-morbidities/Complexities (which will affect course of rehabilitation):   []None        []Hx of COVID   Arthritic conditions   []Rheumatoid arthritis (M05.9)  [x]Osteoarthritis (M19.91)  []Gout   Cardiovascular conditions   []Hypertension (I10)  [x]Hyperlipidemia (E78.5)  []Angina pectoris (I20)  []Atherosclerosis (I70)  []Pacemaker  []Hx of CABG/stent/  cardiac surgeries   Musculoskeletal conditions   []Disc pathology   []Congenital spine pathologies   []Osteoporosis (M81.8)  []Osteopenia (M85.8)  []Scoliosis       Endocrine conditions   []Hypothyroid (E03.9)  []Hyperthyroid Gastrointestinal conditions   []Constipation (V33.30)   Metabolic conditions   []Morbid obesity (E66.01)  []Diabetes type 1(E10.65) or 2 (E11.65)   []Neuropathy (G60.9)     Cardio/Pulmonary conditions   []Asthma (J45)  []Coughing   []COPD (J44.9)  []CHF  []A-fib   Psychological Disorders  []Anxiety (F41.9)  [x]Depression (F32.9)   []Other:   Developmental Disorders  []Autism (F84.0)  []CP (G80)  []Down Syndrome (Q90.9)  []Developmental delay     Neurological conditions  []Prior Stroke (I69.30)  []Parkinson's (G20)  []Encephalopathy (G93.40)  []MS (G35)  []Post-polio (G14)  []SCI  []TBI  []ALS Other conditions  []Fibromyalgia (M79.7)  []Vertigo  []Syncope  []Kidney Failure  []Cancer      []currently undergoing                treatment  []Pregnancy  []Incontinence   Prior surgeries  []involved limb  []previous spinal surgery  [] section birth  []hysterectomy  []bowel / bladder surgery  []other relevant surgeries   [x]Other:   A-Fib, Hodgkin's Disease            Barriers to/and or personal factors that will affect rehab potential:              []Age  []Sex    []Smoker              []Motivation/Lack of Motivation                        [x]Co-Morbidities              []Cognitive Function, education/learning barriers              []Environmental, home barriers              []profession/work barriers  []past PT/medical experience  []other:  Justification:      Falls Risk Assessment (30 days):   [x] Falls Risk assessed and no intervention required.   [] Falls Risk assessed and Patient requires intervention due to being higher risk   TUG score (>12s at risk):     [] Falls education provided, including       ASSESSMENT:   Functional Impairments   []Noted spinal or UE joint hypomobility   []Noted spinal or UE joint hypermobility   [x]Decreased UE functional ROM   [x]Decreased UE functional strength   []Abnormal reflexes/sensation/myotomal/dermatomal deficits   [x]Decreased RC/scapular/core strength and neuromuscular control   []other:      Functional Activity Limitations (from functional questionnaire and intake)   [x]Reduced ability to tolerate prolonged functional positions   [x]Reduced ability or difficulty with changes of positions or transfers between positions   [x]Reduced ability to maintain good posture and demonstrate good body mechanics with sitting, bending, and lifting   [x] Reduced ability or tolerance with driving and/or computer work   [x]Reduced ability to sleep   [x]Reduced ability to perform lifting, reaching, carrying tasks   [x]Reduced ability to tolerate impact through UE   [x]Reduced ability to reach behind back   [x]Reduced ability to  or hold objects   [x]Reduced ability to throw or toss an object   []other:    Participation Restrictions   [x]Reduced participation in self care activities   [x]Reduced participation in home management activities   [x]Reduced participation in work activities   [x]Reduced participation in social activities. [x]Reduced participation in sport/recreation activities. Classification:   []Signs/symptoms consistent with post-surgical status including decreased ROM, strength and function.   []Signs/symptoms consistent with joint sprain/strain   [x]Signs/symptoms consistent with shoulder impingement   []Signs/symptoms consistent with shoulder/elbow/wrist tendinopathy   []Signs/symptoms consistent with Rotator cuff tear   []Signs/symptoms consistent with labral tear   []Signs/symptoms consistent with postural dysfunction    []Signs/symptoms consistent with Glenohumeral IR Deficit - <45 degrees   []Signs/symptoms consistent with facet dysfunction of cervical/thoracic spine    []Signs/symptoms consistent with pathology which may benefit from Dry needling     []other:     Prognosis/Rehab Potential:      []Excellent   [x]Good    []Fair   []Poor    Tolerance of evaluation/treatment:    []Excellent   [x]Good    []Fair   []Poor    Physical Therapy Evaluation Complexity Justification  [x] A history of present problem with:  [] no personal factors and/or comorbidities that impact the plan of care;  []1-2 personal factors and/or comorbidities that impact the plan of care  []3 personal factors and/or comorbidities that impact the plan of care  [x] An examination of body systems using standardized tests and measures addressing any of the following: body structures and functions (impairments), activity limitations, and/or participation restrictions;:  [] a total of 1-2 or more elements   [] a total of 3 or more elements   [] a total of 4 or more elements   [x] A clinical presentation with:  [] stable and/or uncomplicated characteristics   [] evolving clinical presentation with changing characteristics  [] unstable and unpredictable characteristics;   [x] Clinical decision making of [] low, [] moderate, [x] high complexity using standardized patient assessment instrument and/or measurable assessment of functional outcome. [x] EVAL (LOW) 16719 (typically 15 minutes face-to-face)  [] EVAL (MOD) 98957 (typically 30 minutes face-to-face)  [] EVAL (HIGH) 87464 (typically 45 minutes face-to-face)  [] RE-EVAL     PLAN:  Frequency/Duration:  2 days per week for 8 Weeks:  INTERVENTIONS:  [x] Therapeutic exercise including: strength training, ROM, for Upper extremity and core   [x]  NMR activation and proprioception for UE, scap and Core   [x] Manual therapy as indicated for shoulder, scapula and spine to include: Dry Needling/IASTM, STM, PROM, Gr I-IV mobilizations, manipulation. [x] Modalities as needed that may include: thermal agents, E-stim, Biofeedback, US, iontophoresis as indicated  [x] Patient education on joint protection, postural re-education, activity modification, progression of HEP. HEP instruction: Written HEP instructions provided and reviewed     GOALS:  Patient stated goal: Decrease Pain, Increase Strength   [] Progressing: [] Met: [] Not Met: [] Adjusted    Therapist goals for Patient:   Short Term Goals: To be achieved in: 2 weeks  1. Independent in HEP and progression per patient tolerance, in order to prevent re-injury. [] Progressing: [] Met: [] Not Met: [] Adjusted  2. Patient will have a decrease in pain to facilitate improvement in movement, function, and ADLs as indicated by Functional Deficits. [] Progressing: [] Met: [] Not Met: [] Adjusted    Long Term Goals: To be achieved in: 8 weeks  1. Disability index score of 10% or less for the UEFI or Quick DASH to assist with reaching prior level of function. [] Progressing: [] Met: [] Not Met: [] Adjusted  2. Patient will demonstrate increased AROM to 180 degrees to allow for proper joint functioning as indicated by patients Functional Deficits. [] Progressing: [] Met: [] Not Met: [] Adjusted  3. Patient will demonstrate an increase in Strength to 5/5 to allow for proper functional mobility as indicated by patients Functional Deficits. [] Progressing: [] Met: [] Not Met: [] Adjusted  4. Patient will return to functional activities including overhead lifting without increased symptoms or restriction.    [] Progressing: [] Met: [] Not Met: [] Adjusted      Electronically signed by:  Marcelino Epley, PT

## 2022-12-04 NOTE — FLOWSHEET NOTE
[facility]  Phone: [phone]  Fax: [phone]    Physical Therapy Treatment Note/ Progress Report:     Date:  2022     Patient Name:  Akash Reza    :  1960  MRN: [id number]  Restrictions/Precautions:    Medical/Treatment Diagnosis Information:  Chronic right shoulder pain [M25.511, G89.29]  Treatment Diagnosis: Decreased Right Shoulder AROM & Strength  Insurance/Certification information:  PT Insurance Information: [facility] Medicare ([employer])  Physician Information:  Scotty Vicente MD   Plan of care signed (Y/N): []  Yes  [x]  No     Date of Patient follow up with Physician:      Progress Report: []  Yes  [x]  No     Date Range for reporting period:  Beginnin2022  Ending:     Progress report due (10 Rx/or 30 days whichever is less): 37     Recertification due (POC duration/ or 90 days whichever is less): 3/2/23     Visit # Insurance Allowable Auth required? Date Range    [employer]  []  Yes  []  No      Latex Allergy:  [x]NO      []YES  Preferred Language for Healthcare:   [x]English       []other:    Functional Scale:        Date assessed:  TO physical FS primary measure score = 57; risk adjusted = 44  22    Pain level:  7/10     History:  Patient stated complaint: Pt presents with c/o right shoulder pain. Had labral repair 7-8 years ago. Having pain and N/T. Rates his pain a 7/10. Has numbness from shoulder into right thumb and finger that has been going for the last couple months. Pain is affecting his ability to sleep. Pt is right hand dominant. Right arm feels weak. Pt is retired but does help his friend at his work where is uses a  for multiple hours per day that is extremely powerful. At times develops neck pain when using the .      SUBJECTIVE:  See eval    OBJECTIVE: See eval      RESTRICTIONS/PRECAUTIONS: Previous Right Shoulder Labral Repair 7-8 years ago, Left Total Shoulder Replacement, A-Fib, Depression, Hodgkin's Disease     Exercises/Interventions:     Therapeutic Exercises  (95268) Resistance / level Sets/sec Reps Notes                                                                         Therapeutic Activities (39063)                                   Neuromuscular Re-ed (89466)                                                 Manual Intervention (.[email or web address])                                                     Pt. Education:  -pt educated on diagnosis, prognosis and expectations for rehab  -all pt questions were answered  Access Code: [email or web address]  URL: [email or web address]  Date: 2022  Prepared by: Isidoro Connell    Exercises  Supine Shoulder Flexion Extension AAROM with Dowel - 1 x daily - 7 x weekly - 3 sets - 10 reps  Seated Shoulder Abduction AAROM with Dowel - 1 x daily - 7 x weekly - 3 sets - 10 reps  Shoulder Internal Rotation with Resistance - 1 x daily - 7 x weekly - 3 sets - 10 reps  Shoulder External Rotation with Anchored Resistance - 1 x daily - 7 x weekly - 3 sets - 10 reps    Home Exercise Program:  22: The following exercises were performed and added to the pt's home program (educated on appropriate frequency, intensity and duration etc.):      Therapeutic Exercise and NMR EXR  [x]  (25838) Provided verbal/tactile cueing for activities related to strengthening, flexibility, endurance, ROM for improvements in  [] LE / Lumbar: LE, proximal hip, and core control with self care, mobility, lifting, ambulation.   [x] UE / Cervical: cervical, postural, scapular, scapulothoracic and UE control with self care, reaching, carrying, lifting, house/yardwork, driving, computer work.  [] (78039) Provided verbal/tactile cueing for activities related to improving balance, coordination, kinesthetic sense, posture, motor skill, proprioception to assist with   [] LE / lumbar: LE, proximal hip, and core control in self care, mobility, lifting, ambulation and eccentric single leg control. [] UE / cervical: cervical, scapular, scapulothoracic and UE control with self care, reaching, carrying, lifting, house/yardwork, driving, computer work.      NMR and Therapeutic Activities:    [] (05232 or 05718) Provided verbal/tactile cueing for activities related to improving balance, coordination, kinesthetic sense, posture, motor skill, proprioception and motor activation to allow for proper function of   [] LE: / Lumbar core, proximal hip and LE with self care and ADLs  [] UE / Cervical: cervical, postural, scapular, scapulothoracic and UE control with self care, carrying, lifting, driving, computer work.   [] (44750) Gait Re-education- Provided training and instruction to the patient for proper LE, core and proximal hip recruitment and positioning and eccentric body weight control with ambulation re-education including up and down stairs     Home Exercise Program:    [x] (33761) Reviewed/Progressed HEP activities related to strengthening, flexibility, endurance, ROM of   [] LE / Lumbar: core, proximal hip and LE for functional self-care, mobility, lifting and ambulation/stair navigation   [x] UE / Cervical: cervical, postural, scapular, scapulothoracic and UE control with self care, reaching, carrying, lifting, house/yardwork, driving, computer work  [] (09130)Reviewed/Progressed HEP activities related to improving balance, coordination, kinesthetic sense, posture, motor skill, proprioception of   [] LE: core, proximal hip and LE for self care, mobility, lifting, and ambulation/stair navigation    [] UE / Cervical: cervical, postural,  scapular, scapulothoracic and UE control with self care, reaching, carrying, lifting, house/yardwork, driving, computer work    Manual Treatments:  PROM / STM / Oscillations-Mobs:  G-I, II, III, IV (PA's, Inf., Post.)  [] (61594) Provided manual therapy to mobilize LE, proximal hip and/or LS spine soft tissue/joints for the purpose of modulating pain, promoting relaxation,  increasing ROM, reducing/eliminating soft tissue swelling/inflammation/restriction, improving soft tissue extensibility and allowing for proper ROM for normal function with   [] LE / lumbar: self care, mobility, lifting and ambulation. [] UE / Cervical: self care, reaching, carrying, lifting, house/yardwork, driving, computer work. Modalities:  [] (83479) Vasopneumatic compression: Utilized vasopneumatic compression to decrease edema / swelling for the purpose of improving mobility and quad tone / recruitment which will allow for increased overall function including but not limited to self-care, transfers, ambulation, and ascending / descending stairs. Modalities:      Charges:  Timed Code Treatment Minutes: 15   Total Treatment Minutes: 33     [x] EVAL - LOW (46661)   [] EVAL - MOD (89518)  [] EVAL - HIGH (12272)  [] RE-EVAL (93458)  [] TE (96479) x      [] Ionto (56265)  [] NMR (85612) x      [] Vaso (18251)  [] Manual (39565) x      [] Ultrasound  [x] TA (35361) x  1    [] Mech Traction (06439)  [] Gait Training x     [] ES (un) (59274):   [] Aquatic therapy x   [] Other:   [] Group:     If [facility] Please Indicate Time In/Out  CPT Code Time in Time out                                     GOALS:   Patient stated goal: Decrease Pain, Increase Strength   [] Progressing: [] Met: [] Not Met: [] Adjusted     Therapist goals for Patient:   Short Term Goals: To be achieved in: 2 weeks  1. Independent in HEP and progression per patient tolerance, in order to prevent re-injury. [] Progressing: [] Met: [] Not Met: [] Adjusted  2. Patient will have a decrease in pain to facilitate improvement in movement, function, and ADLs as indicated by Functional Deficits. [] Progressing: [] Met: [] Not Met: [] Adjusted     Long Term Goals: To be achieved in: 8 weeks  1. Disability index score of 10% or less for the UEFI or Quick DASH to assist with reaching prior level of function.    [] Progressing: [] Met: [] Not Met: [] Adjusted  2. Patient will demonstrate increased AROM to 180 degrees to allow for proper joint functioning as indicated by patients Functional Deficits. [] Progressing: [] Met: [] Not Met: [] Adjusted  3. Patient will demonstrate an increase in Strength to 5/5 to allow for proper functional mobility as indicated by patients Functional Deficits. [] Progressing: [] Met: [] Not Met: [] Adjusted  4. Patient will return to functional activities including overhead lifting without increased symptoms or restriction. [] Progressing: [] Met: [] Not Met: [] Adjusted    Overall Progression Towards Functional goals/ Treatment Progress Update:  [] Patient is progressing as expected towards functional goals listed. [] Progression is slowed due to complexities/Impairments listed. [] Progression has been slowed due to co-morbidities. [x] Plan just implemented, too soon to assess goals progression <30days   [] Goals require adjustment due to lack of progress  [] Patient is not progressing as expected and requires additional follow up with physician  [] Other    Persisting Functional Limitations/Impairments:  [x]Sleeping []Sitting               []Standing []Transfers        []Walking []Kneeling               []Stairs []Squatting / bending   [x]ADLs [x]Reaching  [x]Lifting  [x]Housework  [x]Driving [x]Job related tasks  [x]Sports/Recreation []Other:        ASSESSMENT:  See eval  Treatment/Activity Tolerance:  [x] Patient able to complete tx [] Patient limited by fatique  [] Patient limited by pain  [] Patient limited by other medical complications  [] Other:     Prognosis: [x] Good [] Fair  [] Poor    Patient Requires Follow-up: [x] Yes  [] No    Plan for next treatment session:    PLAN: See eval. PT 2x / week for 8 weeks.    [] Continue per plan of care [] Alter current plan (see comments)  [x] Plan of care initiated [] Hold pending MD visit [] Discharge    Electronically signed by: Sylvia Amaya Ana Hooper, PT PT, DPT    Note: If patient does not return for scheduled/ recommended follow up visits, this note will serve as a discharge from care along with most recent update on progress.

## 2022-12-06 ENCOUNTER — HOSPITAL ENCOUNTER (OUTPATIENT)
Dept: PHYSICAL THERAPY | Age: 62
Setting detail: THERAPIES SERIES
Discharge: HOME | End: 2022-12-06
Payer: MEDICARE

## 2022-12-06 PROCEDURE — 97140 MANUAL THERAPY 1/> REGIONS: CPT

## 2022-12-06 PROCEDURE — 97530 THERAPEUTIC ACTIVITIES: CPT

## 2022-12-06 PROCEDURE — 97110 THERAPEUTIC EXERCISES: CPT

## 2022-12-06 NOTE — FLOWSHEET NOTE
[facility]  Phone: [phone]  Fax: [phone]    Physical Therapy Treatment Note/ Progress Report:     Date:  2022     Patient Name:  Malgorzata Pulido    :  1960  MRN: [id number]  Restrictions/Precautions:    Medical/Treatment Diagnosis Information:  Chronic right shoulder pain [M25.511, G89.29]  Treatment Diagnosis: Decreased Right Shoulder AROM & Strength  Insurance/Certification information:  PT Insurance Information: [facility] Medicare (Med Nec)  Physician Information:  Danika Suárez MD   Plan of care signed (Y/N): []  Yes  [x]  No     Date of Patient follow up with Physician:      Progress Report: []  Yes  [x]  No     Date Range for reporting period:  Beginnin2022  Ending:     Progress report due (10 Rx/or 30 days whichever is less): 78     Recertification due (POC duration/ or 90 days whichever is less): 3/2/23     Visit # Insurance Allowable Auth required? Date Range    Med Nec  []  Yes  []  No      Latex Allergy:  [x]NO      []YES  Preferred Language for Healthcare:   [x]English       []other:    Functional Scale:        Date assessed:  TO physical FS primary measure score = 57; risk adjusted = 44  22    Pain level:  7/10     History:  Patient stated complaint: Pt presents with c/o right shoulder pain. Had labral repair 7-8 years ago. Having pain and N/T. Rates his pain a 7/10. Has numbness from shoulder into right thumb and finger that has been going for the last couple months. Pain is affecting his ability to sleep. Pt is right hand dominant. Right arm feels weak. Pt is retired but does help his friend at his work where is uses a  for multiple hours per day that is extremely powerful. At times develops neck pain when using the . SUBJECTIVE: Pt reports that his shoulder is actually feeling pretty good today.      OBJECTIVE: See eval      RESTRICTIONS/PRECAUTIONS: Previous Right Shoulder Labral Repair 7-8 years ago, Left Total Shoulder Replacement, A-Fib, Depression, Hodgkin's Disease     Exercises/Interventions:   R shldr  Therapeutic Exercises  (79322) Resistance / level Sets/sec Reps Notes   UBE  2' for     Pulleys   10 flex    Wall wash flex       Ball on table roll green  10 ea           UT stretch  Doorway pec stretch  Doorknob stretch                                   Therapeutic Activities (11628)       CC: Mid row  Shldr ext  [employer] add  IR  ER  High row   12.5#  7.5#  5#  2.5#  2.5#  15#   2  2  2      2   10  10  10 R  10 R  10 R  10                         Neuromuscular Re-ed (96090)                                                 Manual Intervention (14059)       R shldr PROM, gentle GH distraction  12'     Manual cx traction  2'                                     Pt. Education:  -pt educated on diagnosis, prognosis and expectations for rehab  -all pt questions were answered  Access Code: [email or web address]  URL: [email or web address]  Date: 12/04/2022  Prepared by: Merritt Lyn    Exercises  Supine Shoulder Flexion Extension AAROM with Dowel - 1 x daily - 7 x weekly - 3 sets - 10 reps  Seated Shoulder Abduction AAROM with Dowel - 1 x daily - 7 x weekly - 3 sets - 10 reps  Shoulder Internal Rotation with Resistance - 1 x daily - 7 x weekly - 3 sets - 10 reps  Shoulder External Rotation with Anchored Resistance - 1 x daily - 7 x weekly - 3 sets - 10 reps    Home Exercise Program:  12/2/22: The following exercises were performed and added to the pt's home program (educated on appropriate frequency, intensity and duration etc.):      Therapeutic Exercise and NMR EXR  [x]  (91041) Provided verbal/tactile cueing for activities related to strengthening, flexibility, endurance, ROM for improvements in  [] LE / Lumbar: LE, proximal hip, and core control with self care, mobility, lifting, ambulation.   [x] UE / Cervical: cervical, postural, scapular, scapulothoracic and UE control with self care, reaching, carrying, lifting, house/yardwork, driving, computer work.  [] (72713) Provided verbal/tactile cueing for activities related to improving balance, coordination, kinesthetic sense, posture, motor skill, proprioception to assist with   [] LE / lumbar: LE, proximal hip, and core control in self care, mobility, lifting, ambulation and eccentric single leg control. [] UE / cervical: cervical, scapular, scapulothoracic and UE control with self care, reaching, carrying, lifting, house/yardwork, driving, computer work.      NMR and Therapeutic Activities:    [] (86250 or 42263) Provided verbal/tactile cueing for activities related to improving balance, coordination, kinesthetic sense, posture, motor skill, proprioception and motor activation to allow for proper function of   [] LE: / Lumbar core, proximal hip and LE with self care and ADLs  [] UE / Cervical: cervical, postural, scapular, scapulothoracic and UE control with self care, carrying, lifting, driving, computer work.   [] (61030) Gait Re-education- Provided training and instruction to the patient for proper LE, core and proximal hip recruitment and positioning and eccentric body weight control with ambulation re-education including up and down stairs     Home Exercise Program:    [x] (26192) Reviewed/Progressed HEP activities related to strengthening, flexibility, endurance, ROM of   [] LE / Lumbar: core, proximal hip and LE for functional self-care, mobility, lifting and ambulation/stair navigation   [x] UE / Cervical: cervical, postural, scapular, scapulothoracic and UE control with self care, reaching, carrying, lifting, house/yardwork, driving, computer work  [] (97356)Reviewed/Progressed HEP activities related to improving balance, coordination, kinesthetic sense, posture, motor skill, proprioception of   [] LE: core, proximal hip and LE for self care, mobility, lifting, and ambulation/stair navigation    [] UE / Cervical: cervical, postural,  scapular, scapulothoracic and UE control with self care, reaching, carrying, lifting, house/yardwork, driving, computer work    Manual Treatments:  PROM / STM / Oscillations-Mobs:  G-I, II, III, IV (PA's, Inf., Post.)  [] (65924) Provided manual therapy to mobilize LE, proximal hip and/or LS spine soft tissue/joints for the purpose of modulating pain, promoting relaxation,  increasing ROM, reducing/eliminating soft tissue swelling/inflammation/restriction, improving soft tissue extensibility and allowing for proper ROM for normal function with   [] LE / lumbar: self care, mobility, lifting and ambulation. [] UE / Cervical: self care, reaching, carrying, lifting, house/yardwork, driving, computer work. Modalities:  [] (59353) Vasopneumatic compression: Utilized vasopneumatic compression to decrease edema / swelling for the purpose of improving mobility and quad tone / recruitment which will allow for increased overall function including but not limited to self-care, transfers, ambulation, and ascending / descending stairs. Modalities:      Charges:  Timed Code Treatment Minutes: 45   Total Treatment Minutes: 45     [] EVAL - LOW (00204)   [] EVAL - MOD (28916)  [] EVAL - HIGH (55535)  [] RE-EVAL (14989)  [x] TE (55594) x1      [] Ionto (42028)  [] NMR (33666) x      [] Vaso (89447)  [x] Manual (68566) x1      [] Ultrasound  [x] TA (09864) x  1    [] Mech Traction (42259)  [] Gait Training x     [] ES (un) (18525):   [] Aquatic therapy x   [] Other:   [] Group:     If BW Please Indicate Time In/Out  CPT Code Time in Time out                                     GOALS:   Patient stated goal: Decrease Pain, Increase Strength   [] Progressing: [] Met: [] Not Met: [] Adjusted     Therapist goals for Patient:   Short Term Goals: To be achieved in: 2 weeks  1. Independent in HEP and progression per patient tolerance, in order to prevent re-injury.    [] Progressing: [] Met: [] Not Met: [] Adjusted  2. Patient will have a decrease in pain to facilitate improvement in movement, function, and ADLs as indicated by Functional Deficits. [] Progressing: [] Met: [] Not Met: [] Adjusted     Long Term Goals: To be achieved in: 8 weeks  1. Disability index score of 10% or less for the UEFI or Quick DASH to assist with reaching prior level of function. [] Progressing: [] Met: [] Not Met: [] Adjusted  2. Patient will demonstrate increased AROM to 180 degrees to allow for proper joint functioning as indicated by patients Functional Deficits. [] Progressing: [] Met: [] Not Met: [] Adjusted  3. Patient will demonstrate an increase in Strength to 5/5 to allow for proper functional mobility as indicated by patients Functional Deficits. [] Progressing: [] Met: [] Not Met: [] Adjusted  4. Patient will return to functional activities including overhead lifting without increased symptoms or restriction. [] Progressing: [] Met: [] Not Met: [] Adjusted    Overall Progression Towards Functional goals/ Treatment Progress Update:  [] Patient is progressing as expected towards functional goals listed. [] Progression is slowed due to complexities/Impairments listed. [] Progression has been slowed due to co-morbidities. [x] Plan just implemented, too soon to assess goals progression <30days   [] Goals require adjustment due to lack of progress  [] Patient is not progressing as expected and requires additional follow up with physician  [] Other    Persisting Functional Limitations/Impairments:  [x]Sleeping []Sitting               []Standing []Transfers        []Walking []Kneeling               []Stairs []Squatting / bending   [x]ADLs [x]Reaching  [x]Lifting  [x]Housework  [x]Driving [x]Job related tasks  [x]Sports/Recreation []Other:        ASSESSMENT: Pt with fair exercise tolerance. Instructed pt to communicate any changes in symptoms. Did well with initial shoulder strengthening exercises. Rinku R shldr PROM today. Noted that manual cx traction felt good. Treatment/Activity Tolerance:  [x] Patient able to complete tx [] Patient limited by fatique  [] Patient limited by pain  [] Patient limited by other medical complications  [] Other:     Prognosis: [x] Good [] Fair  [] Poor    Patient Requires Follow-up: [x] Yes  [] No    Plan for next treatment session:    PLAN: See eval. PT 2x / week for 8 weeks. [] Continue per plan of care [] Alter current plan (see comments)  [x] Plan of care initiated [] Hold pending MD visit [] Discharge    Electronically signed by: Kenneth Cui, PTA [id number]    Note: If patient does not return for scheduled/ recommended follow up visits, this note will serve as a discharge from care along with most recent update on progress.

## 2022-12-09 ENCOUNTER — HOSPITAL ENCOUNTER (OUTPATIENT)
Dept: PHYSICAL THERAPY | Age: 62
Setting detail: THERAPIES SERIES
Discharge: HOME | End: 2022-12-09
Payer: MEDICARE

## 2022-12-09 PROCEDURE — 97530 THERAPEUTIC ACTIVITIES: CPT

## 2022-12-09 PROCEDURE — 97140 MANUAL THERAPY 1/> REGIONS: CPT

## 2022-12-09 PROCEDURE — 97110 THERAPEUTIC EXERCISES: CPT

## 2022-12-09 NOTE — FLOWSHEET NOTE
[facility]  Phone: [phone]  Fax: [phone]    Physical Therapy Treatment Note/ Progress Report:     Date:  2022     Patient Name:  Jeana Gamboa    :  1960  MRN: [id number]  Restrictions/Precautions:    Medical/Treatment Diagnosis Information:  Chronic right shoulder pain [M25.511, G89.29]  Treatment Diagnosis: Decreased Right Shoulder AROM & Strength  Insurance/Certification information:  PT Insurance Information: [facility] Medicare (Med Nec)  Physician Information:  Caity Rosales MD   Plan of care signed (Y/N): []  Yes  [x]  No     Date of Patient follow up with Physician:      Progress Report: []  Yes  [x]  No     Date Range for reporting period:  Beginnin2022  Ending:     Progress report due (10 Rx/or 30 days whichever is less):      Recertification due (POC duration/ or 90 days whichever is less): 3/2/23     Visit # Insurance Allowable Auth required? Date Range   3  / 12 Med Nec  []  Yes  []  No      Latex Allergy:  [x]NO      []YES  Preferred Language for Healthcare:   [x]English       []other:    Functional Scale:        Date assessed:  [facility] physical FS primary measure score = 57; risk adjusted = 44  22    Pain level:  710     History:  Patient stated complaint: Pt presents with c/o right shoulder pain. Had labral repair 7-8 years ago. Having pain and N/T. Rates his pain a 7/10. Has numbness from shoulder into right thumb and finger that has been going for the last couple months. Pain is affecting his ability to sleep. Pt is right hand dominant. Right arm feels weak. Pt is retired but does help his friend at his work where is uses a  for multiple hours per day that is extremely powerful. At times develops neck pain when using the . SUBJECTIVE: Pt states numbness is going away and he has been Amazed at how well PT has gone so far.      OBJECTIVE: See eval      RESTRICTIONS/PRECAUTIONS: Previous Right Shoulder Labral Repair 7-8 years ago, Left Total Shoulder Replacement, A-Fib, Depression, Hodgkin's Disease     Exercises/Interventions:   R shldr  Therapeutic Exercises  (52837) Resistance / level Sets/sec Reps Notes   UBE  2' for     Pulleys   10 flex    Wall wash flex       Ball on table roll green  10 ea    3 way curls    4# x 10 B    UT stretch  Doorway pec stretch  Doorknob stretch                                   Therapeutic Activities (20106)       CC: Mid row  Shldr ext  [employer] add  IR  ER  High row   12.5#  7.5#  5#  2.5#  2.5#  15#   2  2  2      2   10  10  10 R  10 R  10 R  10    BOSU Black side up    AP, ML, CW/CCW x 10                   Neuromuscular Re-ed (66347)                                                 Manual Intervention (58409)       R shldr PROM, gentle GH distraction  12'     Manual cx traction  3'                                     Pt. Education:  -pt educated on diagnosis, prognosis and expectations for rehab  -all pt questions were answered  Access Code: [email or web address]  URL: [email or web address]  Date: 12/04/2022  Prepared by: Keri Essex    Exercises  Supine Shoulder Flexion Extension AAROM with Dowel - 1 x daily - 7 x weekly - 3 sets - 10 reps  Seated Shoulder Abduction AAROM with Dowel - 1 x daily - 7 x weekly - 3 sets - 10 reps  Shoulder Internal Rotation with Resistance - 1 x daily - 7 x weekly - 3 sets - 10 reps  Shoulder External Rotation with Anchored Resistance - 1 x daily - 7 x weekly - 3 sets - 10 reps    Home Exercise Program:  12/2/22:  The following exercises were performed and added to the pt's home program (educated on appropriate frequency, intensity and duration etc.):      Therapeutic Exercise and NMR EXR  [x]  (75190) Provided verbal/tactile cueing for activities related to strengthening, flexibility, endurance, ROM for improvements in  [] LE / Lumbar: LE, proximal hip, and core control with self care, mobility, lifting, ambulation. [x] UE / Cervical: cervical, postural, scapular, scapulothoracic and UE control with self care, reaching, carrying, lifting, house/yardwork, driving, computer work.  [] (29870) Provided verbal/tactile cueing for activities related to improving balance, coordination, kinesthetic sense, posture, motor skill, proprioception to assist with   [] LE / lumbar: LE, proximal hip, and core control in self care, mobility, lifting, ambulation and eccentric single leg control. [] UE / cervical: cervical, scapular, scapulothoracic and UE control with self care, reaching, carrying, lifting, house/yardwork, driving, computer work.      NMR and Therapeutic Activities:    [] (12602 or 94470) Provided verbal/tactile cueing for activities related to improving balance, coordination, kinesthetic sense, posture, motor skill, proprioception and motor activation to allow for proper function of   [] LE: / Lumbar core, proximal hip and LE with self care and ADLs  [] UE / Cervical: cervical, postural, scapular, scapulothoracic and UE control with self care, carrying, lifting, driving, computer work.   [] (82773) Gait Re-education- Provided training and instruction to the patient for proper LE, core and proximal hip recruitment and positioning and eccentric body weight control with ambulation re-education including up and down stairs     Home Exercise Program:    [x] (24032) Reviewed/Progressed HEP activities related to strengthening, flexibility, endurance, ROM of   [] LE / Lumbar: core, proximal hip and LE for functional self-care, mobility, lifting and ambulation/stair navigation   [x] UE / Cervical: cervical, postural, scapular, scapulothoracic and UE control with self care, reaching, carrying, lifting, house/yardwork, driving, computer work  [] (32589)Reviewed/Progressed HEP activities related to improving balance, coordination, kinesthetic sense, posture, motor skill, proprioception of   [] LE: core, proximal hip and LE for self care, mobility, lifting, and ambulation/stair navigation    [] UE / Cervical: cervical, postural,  scapular, scapulothoracic and UE control with self care, reaching, carrying, lifting, house/yardwork, driving, computer work    Manual Treatments:  PROM / STM / Oscillations-Mobs:  G-I, II, III, IV (PA's, Inf., Post.)  [] (08551) Provided manual therapy to mobilize LE, proximal hip and/or LS spine soft tissue/joints for the purpose of modulating pain, promoting relaxation,  increasing ROM, reducing/eliminating soft tissue swelling/inflammation/restriction, improving soft tissue extensibility and allowing for proper ROM for normal function with   [] LE / lumbar: self care, mobility, lifting and ambulation. [] UE / Cervical: self care, reaching, carrying, lifting, house/yardwork, driving, computer work. Modalities:  [] (50165) Vasopneumatic compression: Utilized vasopneumatic compression to decrease edema / swelling for the purpose of improving mobility and quad tone / recruitment which will allow for increased overall function including but not limited to self-care, transfers, ambulation, and ascending / descending stairs. Modalities:      Charges:  Timed Code Treatment Minutes: 45   Total Treatment Minutes: 45     [] EVAL - LOW (12939)   [] EVAL - MOD (61676)  [] EVAL - HIGH (00071)  [] RE-EVAL (12311)  [x] TE (53149) x1      [] Ionto (27088)  [] NMR (78835) x      [] Vaso (39637)  [x] Manual (97943) x1      [] Ultrasound  [x] TA (20540) x  1    [] Mech Traction (71557)  [] Gait Training x     [] ES (un) (30390):   [] Aquatic therapy x   [] Other:   [] Group:     If [facility] Please Indicate Time In/Out  CPT Code Time in Time out                                     GOALS:   Patient stated goal: Decrease Pain, Increase Strength   [x] Progressing: [] Met: [] Not Met: [] Adjusted     Therapist goals for Patient:   Short Term Goals: To be achieved in: 2 weeks  1.  Independent in [facility] and progression per patient tolerance, in order to prevent re-injury. [] Progressing: [x] Met: [] Not Met: [] Adjusted  2. Patient will have a decrease in pain to facilitate improvement in movement, function, and ADLs as indicated by Functional Deficits. [x] Progressing: [] Met: [] Not Met: [] Adjusted     Long Term Goals: To be achieved in: 8 weeks  1. Disability index score of 10% or less for the UEFI or Quick DASH to assist with reaching prior level of function. [x] Progressing: [] Met: [] Not Met: [] Adjusted  2. Patient will demonstrate increased AROM to 180 degrees to allow for proper joint functioning as indicated by patients Functional Deficits. [x] Progressing: [] Met: [] Not Met: [] Adjusted  3. Patient will demonstrate an increase in Strength to 5/5 to allow for proper functional mobility as indicated by patients Functional Deficits. [x] Progressing: [] Met: [] Not Met: [] Adjusted  4. Patient will return to functional activities including overhead lifting without increased symptoms or restriction. [x] Progressing: [] Met: [] Not Met: [] Adjusted    Overall Progression Towards Functional goals/ Treatment Progress Update:  [x] Patient is progressing as expected towards functional goals listed. [] Progression is slowed due to complexities/Impairments listed. [] Progression has been slowed due to co-morbidities. [] Plan just implemented, too soon to assess goals progression <30days   [] Goals require adjustment due to lack of progress  [] Patient is not progressing as expected and requires additional follow up with physician  [] Other    Persisting Functional Limitations/Impairments:  [x]Sleeping []Sitting               []Standing []Transfers        []Walking []Kneeling               []Stairs []Squatting / bending   [x]ADLs [x]Reaching  [x]Lifting  [x]Housework  [x]Driving [x]Job related tasks  [x]Sports/Recreation []Other:        ASSESSMENT: Pt tolerated therapy extremely well today. Treatment/Activity Tolerance:  [x] Patient able to complete tx [] Patient limited by fatique  [] Patient limited by pain  [] Patient limited by other medical complications  [] Other:     Prognosis: [x] Good [] Fair  [] Poor    Patient Requires Follow-up: [x] Yes  [] No    Plan for next treatment session:    PLAN: See eval. PT 2x / week for 8 weeks. [x] Continue per plan of care [] Alter current plan (see comments)  [] Plan of care initiated [] Hold pending MD visit [] Discharge    Electronically signed by: Cally Patterson PT     Note: If patient does not return for scheduled/ recommended follow up visits, this note will serve as a discharge from care along with most recent update on progress.

## 2022-12-13 ENCOUNTER — HOSPITAL ENCOUNTER (OUTPATIENT)
Dept: PHYSICAL THERAPY | Age: 62
Setting detail: THERAPIES SERIES
Discharge: HOME | End: 2022-12-13
Payer: MEDICARE

## 2022-12-13 PROCEDURE — 97110 THERAPEUTIC EXERCISES: CPT

## 2022-12-13 PROCEDURE — 97140 MANUAL THERAPY 1/> REGIONS: CPT

## 2022-12-13 PROCEDURE — 97530 THERAPEUTIC ACTIVITIES: CPT

## 2022-12-13 NOTE — FLOWSHEET NOTE
[facility]ernando Delong  Phone: [phone]  Fax: [phone]    Physical Therapy Treatment Note/ Progress Report:     Date:  2022     Patient Name:  Jed Muniz    :  1960  MRN: [id number]  Restrictions/Precautions:    Medical/Treatment Diagnosis Information:  Chronic right shoulder pain [M25.511, G89.29]  Treatment Diagnosis: Decreased Right Shoulder AROM & Strength  Insurance/Certification information:  PT Insurance Information: [facility] Medicare (Med Nec)  Physician Information:  Ian Walker MD   Plan of care signed (Y/N): []  Yes  [x]  No     Date of Patient follow up with Physician:      Progress Report: []  Yes  [x]  No     Date Range for reporting period:  Beginnin2022  Ending:     Progress report due (10 Rx/or 30 days whichever is less): 95     Recertification due (POC duration/ or 90 days whichever is less): 3/2/23     Visit # Insurance Allowable Auth required? Date Range    Med Nec  []  Yes  []  No      Latex Allergy:  [x]NO      []YES  Preferred Language for Healthcare:   [x]English       []other:    Functional Scale:        Date assessed:  [facility] physical FS primary measure score = 57; risk adjusted = 44  22    Pain level:  4/10     History:  Patient stated complaint: Pt presents with c/o right shoulder pain. Had labral repair 7-8 years ago. Having pain and N/T. Rates his pain a 7/10. Has numbness from shoulder into right thumb and finger that has been going for the last couple months. Pain is affecting his ability to sleep. Pt is right hand dominant. Right arm feels weak. Pt is retired but does help his friend at his work where is uses a  for multiple hours per day that is extremely powerful. At times develops neck pain when using the . SUBJECTIVE: Pt reports that he is doing pretty well, has pain behind shoulder but thinks maybe he slept on it wrong.      OBJECTIVE: See eval      RESTRICTIONS/PRECAUTIONS: Previous Right Shoulder Labral Repair 7-8 years ago, Left Total Shoulder Replacement, A-Fib, Depression, Hodgkin's Disease     Exercises/Interventions:   R shldr  Therapeutic Exercises  (88085) Resistance / level Sets/sec Reps Notes   UBE  4' for     Pulleys   10 flex    Wall wash flex       Ball on table roll green  10 ea    3 way curls    4# x 10 B    UT stretch  Doorway pec stretch  Doorknob stretch                                   Therapeutic Activities (43542)       CC: Mid row  Shldr ext  [employer] add  IR  ER  High row   12.5#  7.5#  5#  2.5#  2.5#  15#   2  2  2      2   10  10  10 R  10 R  10 R  10    BOSU Black side up    AP, ML, CW/CCW x 10                   Neuromuscular Re-ed (95405)                                                 Manual Intervention (21063)       R shldr PROM, gentle GH distraction  10'     Manual cx traction  3'                                     Pt. Education:  -pt educated on diagnosis, prognosis and expectations for rehab  -all pt questions were answered  Access Code: [email or web address]  URL: [email or web address]  Date: 12/04/2022  Prepared by: Macy Mccray    Exercises  Supine Shoulder Flexion Extension AAROM with Dowel - 1 x daily - 7 x weekly - 3 sets - 10 reps  Seated Shoulder Abduction AAROM with Dowel - 1 x daily - 7 x weekly - 3 sets - 10 reps  Shoulder Internal Rotation with Resistance - 1 x daily - 7 x weekly - 3 sets - 10 reps  Shoulder External Rotation with Anchored Resistance - 1 x daily - 7 x weekly - 3 sets - 10 reps    Home Exercise Program:  12/2/22:  The following exercises were performed and added to the pt's home program (educated on appropriate frequency, intensity and duration etc.):      Therapeutic Exercise and NMR EXR  [x]  (02485) Provided verbal/tactile cueing for activities related to strengthening, flexibility, endurance, ROM for improvements in  [] LE / Lumbar: LE, proximal hip, and core control with self care, mobility, lifting, ambulation. [x] UE / Cervical: cervical, postural, scapular, scapulothoracic and UE control with self care, reaching, carrying, lifting, house/yardwork, driving, computer work.  [] (69850) Provided verbal/tactile cueing for activities related to improving balance, coordination, kinesthetic sense, posture, motor skill, proprioception to assist with   [] LE / lumbar: LE, proximal hip, and core control in self care, mobility, lifting, ambulation and eccentric single leg control. [] UE / cervical: cervical, scapular, scapulothoracic and UE control with self care, reaching, carrying, lifting, house/yardwork, driving, computer work.      NMR and Therapeutic Activities:    [] (16069 or 80607) Provided verbal/tactile cueing for activities related to improving balance, coordination, kinesthetic sense, posture, motor skill, proprioception and motor activation to allow for proper function of   [] LE: / Lumbar core, proximal hip and LE with self care and ADLs  [] UE / Cervical: cervical, postural, scapular, scapulothoracic and UE control with self care, carrying, lifting, driving, computer work.   [] (72307) Gait Re-education- Provided training and instruction to the patient for proper LE, core and proximal hip recruitment and positioning and eccentric body weight control with ambulation re-education including up and down stairs     Home Exercise Program:    [x] (77324) Reviewed/Progressed HEP activities related to strengthening, flexibility, endurance, ROM of   [] LE / Lumbar: core, proximal hip and LE for functional self-care, mobility, lifting and ambulation/stair navigation   [x] UE / Cervical: cervical, postural, scapular, scapulothoracic and UE control with self care, reaching, carrying, lifting, house/yardwork, driving, computer work  [] (21747)Reviewed/Progressed HEP activities related to improving balance, coordination, kinesthetic sense, posture, motor skill, proprioception of   [] LE: core, proximal hip and LE for self care, mobility, lifting, and ambulation/stair navigation    [] UE / Cervical: cervical, postural,  scapular, scapulothoracic and UE control with self care, reaching, carrying, lifting, house/yardwork, driving, computer work    Manual Treatments:  PROM / STM / Oscillations-Mobs:  G-I, II, III, IV (PA's, Inf., Post.)  [] (24586) Provided manual therapy to mobilize LE, proximal hip and/or LS spine soft tissue/joints for the purpose of modulating pain, promoting relaxation,  increasing ROM, reducing/eliminating soft tissue swelling/inflammation/restriction, improving soft tissue extensibility and allowing for proper ROM for normal function with   [] LE / lumbar: self care, mobility, lifting and ambulation. [] UE / Cervical: self care, reaching, carrying, lifting, house/yardwork, driving, computer work. Modalities:  [] (44174) Vasopneumatic compression: Utilized vasopneumatic compression to decrease edema / swelling for the purpose of improving mobility and quad tone / recruitment which will allow for increased overall function including but not limited to self-care, transfers, ambulation, and ascending / descending stairs. Modalities:      Charges:  Timed Code Treatment Minutes: 45   Total Treatment Minutes: 45     [] EVAL - LOW (59425)   [] EVAL - MOD (17857)  [] EVAL - HIGH (24934)  [] RE-EVAL (73970)  [x] TE (87838) x1      [] Ionto (70272)  [] NMR (25129) x      [] Vaso (57929)  [x] Manual (65012) x1      [] Ultrasound  [x] TA (19490) x  1    [] Mech Traction (02255)  [] Gait Training x     [] ES (un) (84697):   [] Aquatic therapy x   [] Other:   [] Group:     If [facility] Please Indicate Time In/Out  CPT Code Time in Time out                                     GOALS:   Patient stated goal: Decrease Pain, Increase Strength   [x] Progressing: [] Met: [] Not Met: [] Adjusted     Therapist goals for Patient:   Short Term Goals: To be achieved in: 2 weeks  1.  Independent in [facility] and progression per patient tolerance, in order to prevent re-injury. [] Progressing: [x] Met: [] Not Met: [] Adjusted  2. Patient will have a decrease in pain to facilitate improvement in movement, function, and ADLs as indicated by Functional Deficits. [x] Progressing: [] Met: [] Not Met: [] Adjusted     Long Term Goals: To be achieved in: 8 weeks  1. Disability index score of 10% or less for the UEFI or Quick DASH to assist with reaching prior level of function. [x] Progressing: [] Met: [] Not Met: [] Adjusted  2. Patient will demonstrate increased AROM to 180 degrees to allow for proper joint functioning as indicated by patients Functional Deficits. [x] Progressing: [] Met: [] Not Met: [] Adjusted  3. Patient will demonstrate an increase in Strength to 5/5 to allow for proper functional mobility as indicated by patients Functional Deficits. [x] Progressing: [] Met: [] Not Met: [] Adjusted  4. Patient will return to functional activities including overhead lifting without increased symptoms or restriction. [x] Progressing: [] Met: [] Not Met: [] Adjusted    Overall Progression Towards Functional goals/ Treatment Progress Update:  [x] Patient is progressing as expected towards functional goals listed. [] Progression is slowed due to complexities/Impairments listed. [] Progression has been slowed due to co-morbidities. [] Plan just implemented, too soon to assess goals progression <30days   [] Goals require adjustment due to lack of progress  [] Patient is not progressing as expected and requires additional follow up with physician  [] Other    Persisting Functional Limitations/Impairments:  [x]Sleeping []Sitting               []Standing []Transfers        []Walking []Kneeling               []Stairs []Squatting / bending   [x]ADLs [x]Reaching  [x]Lifting  [x]Housework  [x]Driving [x]Job related tasks  [x]Sports/Recreation []Other:        ASSESSMENT: Pt with fair exercise tolerance.  No increase in pain but was fatigued with CC shoulder strengthening exercises, especially IR/ER. Demonstrated improved shoulder flexion AROM with pulleys today. Stated after manual he didn't have pain behind the shoulder anymore. Treatment/Activity Tolerance:  [x] Patient able to complete tx [] Patient limited by fatique  [] Patient limited by pain  [] Patient limited by other medical complications  [] Other:     Prognosis: [x] Good [] Fair  [] Poor    Patient Requires Follow-up: [x] Yes  [] No    Plan for next treatment session:    PLAN: See eval. PT 2x / week for 8 weeks. [x] Continue per plan of care [] Alter current plan (see comments)  [] Plan of care initiated [] Hold pending MD visit [] Discharge    Electronically signed by: Stefania Hernández, PTA [id number]    Note: If patient does not return for scheduled/ recommended follow up visits, this note will serve as a discharge from care along with most recent update on progress.

## 2022-12-16 ENCOUNTER — APPOINTMENT (OUTPATIENT)
Dept: PHYSICAL THERAPY | Age: 62
End: 2022-12-16
Payer: MEDICARE

## 2022-12-19 ENCOUNTER — APPOINTMENT (OUTPATIENT)
Dept: PHYSICAL THERAPY | Age: 62
End: 2022-12-19
Payer: MEDICARE

## 2022-12-21 ENCOUNTER — HOSPITAL ENCOUNTER (OUTPATIENT)
Dept: PHYSICAL THERAPY | Age: 62
Setting detail: THERAPIES SERIES
Discharge: HOME | End: 2022-12-21
Payer: MEDICARE

## 2022-12-21 PROCEDURE — 97530 THERAPEUTIC ACTIVITIES: CPT

## 2022-12-21 PROCEDURE — 97140 MANUAL THERAPY 1/> REGIONS: CPT

## 2022-12-21 PROCEDURE — 97110 THERAPEUTIC EXERCISES: CPT

## 2022-12-21 NOTE — FLOWSHEET NOTE
Fernando Delong  Phone: [phone]  Fax: [phone]    Physical Therapy Treatment Note/ Progress Report:     Date:  2022     Patient Name:  Raymundo Duff    :  1960  MRN: [id number]  Restrictions/Precautions:    Medical/Treatment Diagnosis Information:  Chronic right shoulder pain [M25.511, G89.29]  Treatment Diagnosis: Decreased Right Shoulder AROM & Strength  Insurance/Certification information:  PT Insurance Information: [facility] Medicare (Med Nec)  Physician Information:  Elvi Wilkinson MD   Plan of care signed (Y/N): [x]  Yes  []  No     Date of Patient follow up with Physician:      Progress Report: []  Yes  [x]  No     Date Range for reporting period:  Beginnin2022  Ending:     Progress report due (10 Rx/or 30 days whichever is less): 8/3/46     Recertification due (POC duration/ or 90 days whichever is less): 3/2/23     Visit # Insurance Allowable Auth required? Date Range    Med Nec  []  Yes  []  No      Latex Allergy:  [x]NO      []YES  Preferred Language for Healthcare:   [x]English       []other:    Functional Scale:        Date assessed:  [facility] physical FS primary measure score = 57; risk adjusted = 44  22    Pain level:  410     History:  Patient stated complaint: Pt presents with c/o right shoulder pain. Had labral repair 7-8 years ago. Having pain and N/T. Rates his pain a 7/10. Has numbness from shoulder into right thumb and finger that has been going for the last couple months. Pain is affecting his ability to sleep. Pt is right hand dominant. Right arm feels weak. Pt is retired but does help his friend at his work where is uses a  for multiple hours per day that is extremely powerful. At times develops neck pain when using the .      SUBJECTIVE: Pt reports that he had a great time at the [employer] game but car broke down on the drive back - didn't have much trouble with his shoulder. Does have some numbness in his arm today. OBJECTIVE: See eval      RESTRICTIONS/PRECAUTIONS: Previous Right Shoulder Labral Repair 7-8 years ago, Left Total Shoulder Replacement, A-Fib, Depression, Hodgkin's Disease     Exercises/Interventions:   R shldr  Therapeutic Exercises  (48734) Resistance / level Sets/sec Reps Notes   UBE  4' for     Pulleys   10 flex    Wall wash flex       Ball on table roll green  10 ea    3 way curls    4# x 10 B    UT stretch  Doorway pec stretch  Doorknob stretch     2x20\"                                Therapeutic Activities (20353)       CC: Mid row  Shldr ext  [employer] add  IR  ER  High row  PNF   12.5#  7.5#  5#  5#  5#  15#  7.5#   2  2  2      2   10  10  10 R  10 R  10 R  10  10 B    BOSU Black side up    AP, ML, CW/CCW x 10                   Neuromuscular Re-ed (68089)                                                 Manual Intervention (27131)       R shldr PROM, gentle GH distraction  10'     Manual cx traction  3'                                     Pt. Education:  -pt educated on diagnosis, prognosis and expectations for rehab  -all pt questions were answered  Access Code: [email or web address]  URL: [email or web address]  Date: 12/04/2022  Prepared by: Jessi Velarde    Exercises  Supine Shoulder Flexion Extension AAROM with Dowel - 1 x daily - 7 x weekly - 3 sets - 10 reps  Seated Shoulder Abduction AAROM with Dowel - 1 x daily - 7 x weekly - 3 sets - 10 reps  Shoulder Internal Rotation with Resistance - 1 x daily - 7 x weekly - 3 sets - 10 reps  Shoulder External Rotation with Anchored Resistance - 1 x daily - 7 x weekly - 3 sets - 10 reps    Home Exercise Program:  12/2/22:  The following exercises were performed and added to the pt's home program (educated on appropriate frequency, intensity and duration etc.):      Therapeutic Exercise and NMR EXR  [x]  (16744) Provided verbal/tactile cueing for activities related to strengthening, flexibility, endurance, ROM for improvements in  [] LE / Lumbar: LE, proximal hip, and core control with self care, mobility, lifting, ambulation. [x] UE / Cervical: cervical, postural, scapular, scapulothoracic and UE control with self care, reaching, carrying, lifting, house/yardwork, driving, computer work.  [] (55227) Provided verbal/tactile cueing for activities related to improving balance, coordination, kinesthetic sense, posture, motor skill, proprioception to assist with   [] LE / lumbar: LE, proximal hip, and core control in self care, mobility, lifting, ambulation and eccentric single leg control. [] UE / cervical: cervical, scapular, scapulothoracic and UE control with self care, reaching, carrying, lifting, house/yardwork, driving, computer work.      NMR and Therapeutic Activities:    [] (65381 or 44381) Provided verbal/tactile cueing for activities related to improving balance, coordination, kinesthetic sense, posture, motor skill, proprioception and motor activation to allow for proper function of   [] LE: / Lumbar core, proximal hip and LE with self care and ADLs  [] UE / Cervical: cervical, postural, scapular, scapulothoracic and UE control with self care, carrying, lifting, driving, computer work.   [] (10599) Gait Re-education- Provided training and instruction to the patient for proper LE, core and proximal hip recruitment and positioning and eccentric body weight control with ambulation re-education including up and down stairs     Home Exercise Program:    [x] (00859) Reviewed/Progressed HEP activities related to strengthening, flexibility, endurance, ROM of   [] LE / Lumbar: core, proximal hip and LE for functional self-care, mobility, lifting and ambulation/stair navigation   [x] UE / Cervical: cervical, postural, scapular, scapulothoracic and UE control with self care, reaching, carrying, lifting, house/yardwork, driving, computer work  [] (07805)Reviewed/Progressed HEP activities related to improving balance, coordination, kinesthetic sense, posture, motor skill, proprioception of   [] LE: core, proximal hip and LE for self care, mobility, lifting, and ambulation/stair navigation    [] UE / Cervical: cervical, postural,  scapular, scapulothoracic and UE control with self care, reaching, carrying, lifting, house/yardwork, driving, computer work    Manual Treatments:  PROM / STM / Oscillations-Mobs:  G-I, II, III, IV (PA's, Inf., Post.)  [] (78108) Provided manual therapy to mobilize LE, proximal hip and/or LS spine soft tissue/joints for the purpose of modulating pain, promoting relaxation,  increasing ROM, reducing/eliminating soft tissue swelling/inflammation/restriction, improving soft tissue extensibility and allowing for proper ROM for normal function with   [] LE / lumbar: self care, mobility, lifting and ambulation. [] UE / Cervical: self care, reaching, carrying, lifting, house/yardwork, driving, computer work. Modalities:  [] (62705) Vasopneumatic compression: Utilized vasopneumatic compression to decrease edema / swelling for the purpose of improving mobility and quad tone / recruitment which will allow for increased overall function including but not limited to self-care, transfers, ambulation, and ascending / descending stairs.        Modalities:      Charges:  Timed Code Treatment Minutes: 45   Total Treatment Minutes: 45     [] EVAL - LOW (88398)   [] EVAL - MOD (43990)  [] EVAL - HIGH (93923)  [] RE-EVAL (98084)  [x] TE (81163) x1      [] Ionto (15201)  [] NMR (47235) x      [] Vaso (83516)  [x] Manual (95526) x1      [] Ultrasound  [x] TA (88313) x  1    [] Mech Traction (57366)  [] Gait Training x     [] ES (un) (31853):   [] Aquatic therapy x   [] Other:   [] Group:     If [facility] Please Indicate Time In/Out  CPT Code Time in Time out                                     GOALS:   Patient stated goal: Decrease Pain, Increase Strength   [x] Progressing: [] Met: [] Not Met: [] Adjusted     Therapist goals for Patient:   Short Term Goals: To be achieved in: 2 weeks  1. Independent in HEP and progression per patient tolerance, in order to prevent re-injury. [] Progressing: [x] Met: [] Not Met: [] Adjusted  2. Patient will have a decrease in pain to facilitate improvement in movement, function, and ADLs as indicated by Functional Deficits. [x] Progressing: [] Met: [] Not Met: [] Adjusted     Long Term Goals: To be achieved in: 8 weeks  1. Disability index score of 10% or less for the UEFI or Quick DASH to assist with reaching prior level of function. [x] Progressing: [] Met: [] Not Met: [] Adjusted  2. Patient will demonstrate increased AROM to 180 degrees to allow for proper joint functioning as indicated by patients Functional Deficits. [x] Progressing: [] Met: [] Not Met: [] Adjusted  3. Patient will demonstrate an increase in Strength to 5/5 to allow for proper functional mobility as indicated by patients Functional Deficits. [x] Progressing: [] Met: [] Not Met: [] Adjusted  4. Patient will return to functional activities including overhead lifting without increased symptoms or restriction. [x] Progressing: [] Met: [] Not Met: [] Adjusted    Overall Progression Towards Functional goals/ Treatment Progress Update:  [x] Patient is progressing as expected towards functional goals listed. [] Progression is slowed due to complexities/Impairments listed. [] Progression has been slowed due to co-morbidities.   [] Plan just implemented, too soon to assess goals progression <30days   [] Goals require adjustment due to lack of progress  [] Patient is not progressing as expected and requires additional follow up with physician  [] Other    Persisting Functional Limitations/Impairments:  [x]Sleeping []Sitting               []Standing []Transfers        []Walking []Kneeling               []Stairs []Squatting / bending   [x]ADLs [x]Reaching  [x]Lifting  [x]Housework  [x]Driving [x]Job related tasks  [x]Sports/Recreation []Other:        ASSESSMENT: Pt tolerated today's session well. Minimal c/o pain. Did well with strengthening exercises. Noted tightness with doorway pec stretch. Demonstrated good R shldr PROM today. Overall good session. Treatment/Activity Tolerance:  [x] Patient able to complete tx [] Patient limited by fatique  [] Patient limited by pain  [] Patient limited by other medical complications  [] Other:     Prognosis: [x] Good [] Fair  [] Poor    Patient Requires Follow-up: [x] Yes  [] No    Plan for next treatment session:    PLAN: See eval. PT 2x / week for 8 weeks. [x] Continue per plan of care [] Alter current plan (see comments)  [] Plan of care initiated [] Hold pending MD visit [] Discharge    Electronically signed by: Stefania Hernández, PTA [id number]    Note: If patient does not return for scheduled/ recommended follow up visits, this note will serve as a discharge from care along with most recent update on progress.

## 2022-12-27 ENCOUNTER — HOSPITAL ENCOUNTER (OUTPATIENT)
Dept: PHYSICAL THERAPY | Age: 62
Setting detail: THERAPIES SERIES
Discharge: HOME | End: 2022-12-27
Payer: MEDICARE

## 2022-12-27 NOTE — PROGRESS NOTES
Fernando Delong    Physical Therapy  Cancellation/No-show Note  Patient Name:  Frank Saenz  :  1960   Date:  2022    Cancelled visits to date: 1  No-shows to date: 0    For today's appointment patient:  [x]  Cancelled   []  Rescheduled appointment  []  No-show     Reason given by patient:  [x]  Patient ill  []  Conflicting appointment  []  No transportation    []  Conflict with work  []  No reason given  []  Other:     Comments:      Phone call information:   []  Phone call made today to patient at _ time at number provided:      []  Patient answered, conversation as follows:    []  Patient did not answer, message left as follows:  []  Phone call not made today  [x]  Phone call not needed - pt contacted us to cancel and provided reason for cancellation.      Electronically signed by:  Bon Perez, [id number]

## 2022-12-30 ENCOUNTER — APPOINTMENT (OUTPATIENT)
Dept: PHYSICAL THERAPY | Age: 62
End: 2022-12-30
Payer: MEDICARE

## 2023-01-03 ENCOUNTER — HOSPITAL ENCOUNTER (OUTPATIENT)
Dept: PHYSICAL THERAPY | Age: 63
Setting detail: THERAPIES SERIES
Discharge: HOME | End: 2023-01-03
Payer: MEDICARE

## 2023-01-03 NOTE — PROGRESS NOTES
Fernando Delong    Physical Therapy  Cancellation/No-show Note  Patient Name:  Bright Her  :  1960   Date:  1/3/2023    Cancelled visits to date: 1  No-shows to date: 1    For today's appointment patient:  []  [facility]   []  Rescheduled appointment  [x]  No-show 1/3/23     Reason given by patient:  []  Patient ill  []  Conflicting appointment  []  No transportation    []  Conflict with work  []  No reason given  []  Other:     Comments:      Phone call information:   [x]  Phone call made today to patient at 10:40am time at number provided:      []  Patient answered, conversation as follows:    [x]  Patient did not answer, message left as follows: reminder of next appt and to call us if any issues with schedule  []  Phone call not made today  []  Phone call not needed - pt contacted us to cancel and provided reason for cancellation.      Electronically signed by:  Vahid Killian, [id number]

## 2023-01-06 ENCOUNTER — HOSPITAL ENCOUNTER (OUTPATIENT)
Dept: PHYSICAL THERAPY | Age: 63
Setting detail: THERAPIES SERIES
Discharge: HOME | End: 2023-01-06
Payer: MEDICARE

## 2023-01-06 PROCEDURE — 97110 THERAPEUTIC EXERCISES: CPT

## 2023-01-06 PROCEDURE — 97530 THERAPEUTIC ACTIVITIES: CPT

## 2023-01-06 PROCEDURE — 97140 MANUAL THERAPY 1/> REGIONS: CPT

## 2023-01-06 NOTE — FLOWSHEET NOTE
Fernando Delong  Phone: [phone]  Fax: [phone]    Physical Therapy Treatment Note/ Progress Report:     Date:  2023     Patient Name:  Bright Her    :  1960  MRN: [id number]  Restrictions/Precautions:    Medical/Treatment Diagnosis Information:  Chronic right shoulder pain [M25.511, G89.29]  Treatment Diagnosis: Decreased Right Shoulder AROM & Strength  Insurance/Certification information:  PT Insurance Information: [facility] Medicare (Med Nec)  Physician Information:  Stevie Coronado MD   Plan of care signed (Y/N): [x]  Yes  []  No     Date of Patient follow up with Physician:      Progress Report: []  Yes  [x]  No     Date Range for reporting period:  Beginnin2023  Ending:     Progress report due (10 Rx/or 30 days whichever is less): 72     Recertification due (POC duration/ or 90 days whichever is less): 3/2/23     Visit # Insurance Allowable Auth required? Date Range    Med Nec  []  Yes  []  No      Latex Allergy:  [x]NO      []YES  Preferred Language for Healthcare:   [x]English       []other:    Functional Scale:        Date assessed:  [facility] physical FS primary measure score = 57; risk adjusted = 44  22    Pain level:  0/10     History:  Patient stated complaint: Pt presents with c/o right shoulder pain. Had labral repair 7-8 years ago. Having pain and N/T. Rates his pain a 7/10. Has numbness from shoulder into right thumb and finger that has been going for the last couple months. Pain is affecting his ability to sleep. Pt is right hand dominant. Right arm feels weak. Pt is retired but does help his friend at his work where is uses a  for multiple hours per day that is extremely powerful. At times develops neck pain when using the . SUBJECTIVE: Pt states he is feeling better and is not having any pain or N/T. Does state that his knee hurts now though.      OBJECTIVE: 22: Supine Shoulder AROM:    Right:    GH Flexion:      175 degrees   GH aBduction:      180 degrees  GH IR (at 90 degrees aBd):     70 degrees  GH ER (at 90 degrees aBd):     87 degrees     RESTRICTIONS/PRECAUTIONS: Previous Right Shoulder Labral Repair 7-8 years ago, Left Total Shoulder Replacement, A-Fib, Depression, Hodgkin's Disease     Exercises/Interventions:   R shldr  Therapeutic Exercises  (95508) Resistance / level Sets/sec Reps Notes   UBE  4' for     Pulleys   10 flex    Wall wash flex       Ball on table roll green  10 ea    3 way curls    4# x 10 B    UT stretch  Doorway pec stretch  Doorknob stretch     2x20\"                                Therapeutic Activities (85088)       CC: Mid row  Shldr ext  [employer] add  IR  ER  High row  PNF   12.5#  7.5#  5#  5#  5#  15#  7.5#   2  2  2      2   10  10  10 R  10 R  10 R  10  10 B    BOSU Black side up    AP, ML, CW/CCW x 10                   Neuromuscular Re-ed (53000)                                                 Manual Intervention (26110)       R shldr PROM, gentle GH distraction  10'     Manual cx traction  3'                                     Pt. Education:  -pt educated on diagnosis, prognosis and expectations for rehab  -all pt questions were answered  Access Code: [email or web address]  URL: [email or web address]  Date: 12/04/2022  Prepared by: Yanick Del Toro    Exercises  Supine Shoulder Flexion Extension AAROM with Dowel - 1 x daily - 7 x weekly - 3 sets - 10 reps  Seated Shoulder Abduction AAROM with Dowel - 1 x daily - 7 x weekly - 3 sets - 10 reps  Shoulder Internal Rotation with Resistance - 1 x daily - 7 x weekly - 3 sets - 10 reps  Shoulder External Rotation with Anchored Resistance - 1 x daily - 7 x weekly - 3 sets - 10 reps    Home Exercise Program:  12/2/22:  The following exercises were performed and added to the pt's home program (educated on appropriate frequency, intensity and duration etc.):      Therapeutic Exercise and NMR EXR  [x] (59968) Provided verbal/tactile cueing for activities related to strengthening, flexibility, endurance, ROM for improvements in  [] LE / Lumbar: LE, proximal hip, and core control with self care, mobility, lifting, ambulation. [x] UE / Cervical: cervical, postural, scapular, scapulothoracic and UE control with self care, reaching, carrying, lifting, house/yardwork, driving, computer work.  [] (70822) Provided verbal/tactile cueing for activities related to improving balance, coordination, kinesthetic sense, posture, motor skill, proprioception to assist with   [] LE / lumbar: LE, proximal hip, and core control in self care, mobility, lifting, ambulation and eccentric single leg control. [] UE / cervical: cervical, scapular, scapulothoracic and UE control with self care, reaching, carrying, lifting, house/yardwork, driving, computer work.      NMR and Therapeutic Activities:    [] (31594 or 17477) Provided verbal/tactile cueing for activities related to improving balance, coordination, kinesthetic sense, posture, motor skill, proprioception and motor activation to allow for proper function of   [] LE: / Lumbar core, proximal hip and LE with self care and ADLs  [] UE / Cervical: cervical, postural, scapular, scapulothoracic and UE control with self care, carrying, lifting, driving, computer work.   [] (21075) Gait Re-education- Provided training and instruction to the patient for proper LE, core and proximal hip recruitment and positioning and eccentric body weight control with ambulation re-education including up and down stairs     Home Exercise Program:    [x] (86300) Reviewed/Progressed HEP activities related to strengthening, flexibility, endurance, ROM of   [] LE / Lumbar: core, proximal hip and LE for functional self-care, mobility, lifting and ambulation/stair navigation   [x] UE / Cervical: cervical, postural, scapular, scapulothoracic and UE control with self care, reaching, carrying, lifting, house/yardwork, driving, computer work  [] (02515)Reviewed/Progressed HEP activities related to improving balance, coordination, kinesthetic sense, posture, motor skill, proprioception of   [] LE: core, proximal hip and LE for self care, mobility, lifting, and ambulation/stair navigation    [] UE / Cervical: cervical, postural,  scapular, scapulothoracic and UE control with self care, reaching, carrying, lifting, house/yardwork, driving, computer work    Manual Treatments:  PROM / STM / Oscillations-Mobs:  G-I, II, III, IV (PA's, Inf., Post.)  [] ([id number]) Provided manual therapy to mobilize LE, proximal hip and/or LS spine soft tissue/joints for the purpose of modulating pain, promoting relaxation,  increasing ROM, reducing/eliminating soft tissue swelling/inflammation/restriction, improving soft tissue extensibility and allowing for proper ROM for normal function with   [] LE / lumbar: self care, mobility, lifting and ambulation. [] UE / Cervical: self care, reaching, carrying, lifting, house/yardwork, driving, computer work. Modalities:  [] (27654) Vasopneumatic compression: Utilized vasopneumatic compression to decrease edema / swelling for the purpose of improving mobility and quad tone / recruitment which will allow for increased overall function including but not limited to self-care, transfers, ambulation, and ascending / descending stairs.        Modalities:      Charges:  Timed Code Treatment Minutes: 45   Total Treatment Minutes: 45     [] EVAL - LOW (80161)   [] EVAL - MOD (75158)  [] EVAL - HIGH (05894)  [] RE-EVAL (67827)  [x] TE (85257) x1      [] Ionto (06441)  [] NMR (30528) x      [] Vaso (60629)  [x] Manual (08886) x1      [] Ultrasound  [x] TA (22037) x  1    [] Mech Traction (31670)  [] Gait Training x     [] ES (un) (94845):   [] Aquatic therapy x   [] Other:   [] Group:     If BW Please Indicate Time In/Out  CPT Code Time in Time out                                     GOALS:   Patient stated goal: Decrease Pain, Increase Strength   [x] Progressing: [] Met: [] Not Met: [] Adjusted     Therapist goals for Patient:   Short Term Goals: To be achieved in: 2 weeks  1. Independent in HEP and progression per patient tolerance, in order to prevent re-injury. [] Progressing: [x] Met: [] Not Met: [] Adjusted  2. Patient will have a decrease in pain to facilitate improvement in movement, function, and ADLs as indicated by Functional Deficits. [x] Progressing: [] Met: [] Not Met: [] Adjusted     Long Term Goals: To be achieved in: 8 weeks  1. Disability index score of 10% or less for the UEFI or Quick DASH to assist with reaching prior level of function. [x] Progressing: [] Met: [] Not Met: [] Adjusted  2. Patient will demonstrate increased AROM to 180 degrees to allow for proper joint functioning as indicated by patients Functional Deficits. [x] Progressing: [] Met: [] Not Met: [] Adjusted  3. Patient will demonstrate an increase in Strength to 5/5 to allow for proper functional mobility as indicated by patients Functional Deficits. [x] Progressing: [] Met: [] Not Met: [] Adjusted  4. Patient will return to functional activities including overhead lifting without increased symptoms or restriction. [x] Progressing: [] Met: [] Not Met: [] Adjusted    Overall Progression Towards Functional goals/ Treatment Progress Update:  [x] Patient is progressing as expected towards functional goals listed. [] Progression is slowed due to complexities/Impairments listed. [] Progression has been slowed due to co-morbidities.   [] Plan just implemented, too soon to assess goals progression <30days   [] Goals require adjustment due to lack of progress  [] Patient is not progressing as expected and requires additional follow up with physician  [] Other    Persisting Functional Limitations/Impairments:  [x]Sleeping []Sitting               []Standing []Transfers        []Walking []Kneeling               []Stairs []Squatting / bending   [x]ADLs [x]Reaching  [x]Lifting  [x]Housework  [x]Driving [x]Job related tasks  [x]Sports/Recreation []Other:        ASSESSMENT: Pt tolerated today's session well. Treatment/Activity Tolerance:  [x] Patient able to complete tx [] Patient limited by fatique  [] Patient limited by pain  [] Patient limited by other medical complications  [] Other:     Prognosis: [x] Good [] Fair  [] Poor    Patient Requires Follow-up: [x] Yes  [] No    Plan for next treatment session:    PLAN: See eval. PT 2x / week for 8 weeks. [x] Continue per plan of care [] Alter current plan (see comments)  [] Plan of care initiated [] Hold pending MD visit [] Discharge    Electronically signed by: Dasha Fink PT     Note: If patient does not return for scheduled/ recommended follow up visits, this note will serve as a discharge from care along with most recent update on progress.

## 2023-01-10 ENCOUNTER — HOSPITAL ENCOUNTER (OUTPATIENT)
Dept: GENERAL RADIOLOGY | Age: 63
Discharge: HOME | End: 2023-01-10
Payer: MEDICARE

## 2023-01-10 ENCOUNTER — OFFICE VISIT (OUTPATIENT)
Dept: FAMILY MEDICINE CLINIC | Age: 63
End: 2023-01-10
Payer: MEDICARE

## 2023-01-10 VITALS
SYSTOLIC BLOOD PRESSURE: 122 MMHG | OXYGEN SATURATION: 96 % | HEART RATE: 77 BPM | DIASTOLIC BLOOD PRESSURE: 76 MMHG | WEIGHT: 221 LBS | BODY MASS INDEX: 29.29 KG/M2 | HEIGHT: 73 IN

## 2023-01-10 DIAGNOSIS — R05.1 ACUTE COUGH: ICD-10-CM

## 2023-01-10 DIAGNOSIS — R05.1 ACUTE COUGH: Primary | ICD-10-CM

## 2023-01-10 DIAGNOSIS — J30.1 SEASONAL ALLERGIC RHINITIS DUE TO POLLEN: ICD-10-CM

## 2023-01-10 DIAGNOSIS — I48.0 PAROXYSMAL ATRIAL FIBRILLATION (HCC): ICD-10-CM

## 2023-01-10 DIAGNOSIS — F32.2 SEVERE SINGLE CURRENT EPISODE OF MAJOR DEPRESSIVE DISORDER, WITHOUT PSYCHOTIC FEATURES (HCC): ICD-10-CM

## 2023-01-10 DIAGNOSIS — I50.32 DIASTOLIC CHF, CHRONIC (HCC): ICD-10-CM

## 2023-01-10 DIAGNOSIS — T14.91XA SUICIDE ATTEMPT (HCC): ICD-10-CM

## 2023-01-10 PROCEDURE — 71046 X-RAY EXAM CHEST 2 VIEWS: CPT

## 2023-01-10 PROCEDURE — 99214 OFFICE O/P EST MOD 30 MIN: CPT | Performed by: FAMILY MEDICINE

## 2023-01-10 RX ORDER — FLUTICASONE PROPIONATE 50 MCG
SPRAY, SUSPENSION (ML) NASAL
Qty: 16 G | Refills: 5 | Status: SHIPPED | OUTPATIENT
Start: 2023-01-10

## 2023-01-10 NOTE — PROGRESS NOTES
Chief complaint: Follow-up and Cough (1 month worse at night, possible sinus issues )      SUBJECTIVE:  JEOVANY Shrestha (:  1960) is a 63 y.o. male with a past medical history of cardiomyopathy and MDD (hx of SA) who presents with a chief complaint of:  Right shoulder pain is better; doing physical therapy    Dry cough- worse at night when trying to go to sleep. Sleeps elevated. Worse w/ lying down. Got sick a month ago; feels fine but cough won't go away. Has something running down his throat and tickles back of throat. No issues with allergies lately; previously on lisinopril and it was stopped d/t cough. He has knot in middle of chest. Hears wheezing 'rice crispy snap crackle pop' in his chest. Clearing throat a lot. Had similar situation in 22 when we first met. CXR PFTs wnl. Stopped lisinopril and started losartan for cardiomyopathy purposes. Cough resolved after PFTs were done and by the time he went to see pulm in april, the cough was gone. Cardiology for cardiomyopathy/afib/HTN; losartan and metoprolol; due for q3mos f/u    Hx of SA and MDD. Well controlled on zoloft 100mg daily.      SH- twin granddaughters coming in march    Review of Systems:  General: No F/C/NS/fatigue/wt loss   Cardiovascular: No CP  Respiratory: No SOB  GI: No N/V/D/C/abd pain/blood in stool  Neuro: No HA/weakness  Psych: No depressed mood/anxiety  Musculoskeletal: No myalgias    Patient Active Problem List   Diagnosis    Cardiomyopathy ([address]    Shoulder arthritis    Status post total shoulder replacement, left    Coronary atherosclerosis    Diastolic CHF, chronic (HCC)    Hodgkin's disease, nodular sclerosis, of lymph nodes of head, face, and neck (HCC)    Hyperlipidemia    Left bundle branch block (LBBB) on electrocardiogram    Paroxysmal atrial fibrillation (HCC)    Severe single current episode of major depressive disorder, without psychotic features ([address]    Suicide attempt ([address]    Abnormal thyroid function test    Abnormal stress electrocardiogram test    Neuropathy    Male erectile dysfunction    Vitamin D deficiency    Chronic right shoulder pain     Past Medical History:   Diagnosis Date    Allergic rhinitis     Anesthesia complication     INCREASED HEART RATE AFTER PORT PLACEMENT    Arthritis     Atrial fibrillation [facility])     2 episodes:paroxysmal:prior cardiologist.    Coronary atherosclerosis 6/24/2015    Depression     under care of psychiatry    Dilated cardiomyopathy [address] [address]     Hodgkin's disease ([facility] [address]     2000 hodgkins stage 4:Dr. Apolinar Alvarez    Hyperlipidemia     Neuropathy     Prolonged emergence from general anesthesia     X1    S/P colonoscopy 12/27/2019    Dr. Sylvie Hinton drug overdose, intentional self-harm, initial encounter 1/31/2022    Tendonitis     Testicular pain, left 8/21/2012    Viral pharyngitis 11/27/2019    Vitamin D deficiency      Current Outpatient Medications on File Prior to Visit   Medication Sig Dispense Refill    atorvastatin (LIPITOR) 80 MG tablet TAKE 1 TABLET BY MOUTH  DAILY 90 tablet 3    Tadalafil 2.5 MG TABS Take 1 tab po qd prn erectile dysfunction 30 tablet 5    losartan (COZAAR) 25 MG tablet Take 1 tablet by mouth daily 90 tablet 3    metoprolol succinate (TOPROL XL) 25 MG extended release tablet TAKE 1 TABLET BY MOUTH  DAILY 90 tablet 3    vitamin D (ERGOCALCIFEROL) 1.25 MG (12785 UT) CAPS capsule TAKE 1 CAPSULE BY MOUTH  ONCE WEEKLY 13 capsule 3    gabapentin (NEURONTIN) 300 MG capsule Take 1 capsule by mouth 3 times daily. 270 capsule 3    sertraline (ZOLOFT) 100 MG tablet TAKE 1 TABLET BY MOUTH  DAILY 90 tablet 3    aspirin EC 81 MG EC tablet Take 1 tablet by mouth daily 90 tablet 3     No current facility-administered medications on file prior to visit.        OBJECTIVE:  /76 (Site: Right Upper Arm, Position: Sitting, Cuff Size: Large Adult)   Pulse 77   Ht 6' 1\" (1.854 m)   Wt 221 lb (100.2 kg)   SpO2 96%   BMI 29.16 kg/m²      Physical exam:  afebrile, vitals reviewed  Gen:  WD, WN, NAD, A&Ox3, pleasant  Eyes:  Sclerae clear  Neck:  Supple, No cervical or submandibular LAD. No obvious thyromegaly. Heart:  RRR, no murmur, rubs, gallops  Lungs:  CTAB, no W/R/R  Abd:  soft, NT/ND  Skin: No obvious rashes      ASSESSMENT/PLAN:  1. Acute cough  Acute on chronic, uncontrolled. Viral URI seems to cause cough that lingers for months between nov-march. CXR to eval for pathology. Considered allergic cause given this occurred last year around the same time. Considered montelukast but do not recommend d/t hx of MDD and suicide attempt. Restart flonase x 6 weeks daily. Considered neuropathy of nerve to diaphragm. Could consider increasing gabapentin. However, I wouldn't expect this to come and go seasonally. -     XR CHEST (2 VW); Future  2. Paroxysmal atrial fibrillation (HCC)  3. Severe single current episode of major depressive disorder, without psychotic features ([address]  4. Suicide attempt ([facility] [address]  5. Seasonal allergic rhinitis due to pollen  -     fluticasone (FLONASE) 50 MCG/ACT nasal spray; USE 1 SPRAY IN EACH NOSTRIL DAILY, Disp-16 g, R-5Normal  6. Diastolic CHF, chronic (HCC)    Return in about 1 month (around 2/10/2023) for cough if it continues. Electronically signed by Brittni Phipps MD on 1/10/2023 at 4:40 PM.     Please note, portions of this note were completed with a voice recognition program.  Although every effort was made to ensure the accuracy of this automated transcription, some errors in transcription may have occurred.

## 2023-01-13 ENCOUNTER — TELEPHONE (OUTPATIENT)
Dept: FAMILY MEDICINE CLINIC | Age: 63
End: 2023-01-13

## 2023-01-13 NOTE — TELEPHONE ENCOUNTER
Patient is requesting a cough syrup to help with his cough. He isn't sleeping at night.   He was seen on 1/10/23        Please advise

## 2023-01-16 DIAGNOSIS — R05.1 ACUTE COUGH: Primary | ICD-10-CM

## 2023-01-16 RX ORDER — BENZONATATE 200 MG/1
200 CAPSULE ORAL 3 TIMES DAILY PRN
Qty: 30 CAPSULE | Refills: 0 | Status: SHIPPED | OUTPATIENT
Start: 2023-01-16 | End: 2023-01-23

## 2023-01-16 NOTE — TELEPHONE ENCOUNTER
Clemencia Shone, MD  [facility] Clinical Staff 21 minutes ago (5:33 PM)     CH  Cough drops called in.  So sorry for the delay      Left voicemail to callback

## 2023-01-27 ENCOUNTER — HOSPITAL ENCOUNTER (OUTPATIENT)
Dept: PHYSICAL THERAPY | Age: 63
Setting detail: THERAPIES SERIES
Discharge: HOME | End: 2023-01-27
Payer: MEDICARE

## 2023-01-27 PROCEDURE — 97530 THERAPEUTIC ACTIVITIES: CPT | Performed by: SPECIALIST/TECHNOLOGIST

## 2023-01-27 PROCEDURE — 97140 MANUAL THERAPY 1/> REGIONS: CPT | Performed by: SPECIALIST/TECHNOLOGIST

## 2023-01-27 PROCEDURE — 97110 THERAPEUTIC EXERCISES: CPT | Performed by: SPECIALIST/TECHNOLOGIST

## 2023-01-27 NOTE — FLOWSHEET NOTE
Fernando Delong  Phone: [phone]  Fax: [phone]    Physical Therapy Treatment Note/ Progress Report:     Date:  2023     Patient Name:  Leslie Guerrero    :  1960  MRN: [id number]  Restrictions/Precautions:    Medical/Treatment Diagnosis Information:  Chronic right shoulder pain [M25.511, G89.29]  Treatment Diagnosis: Decreased Right Shoulder AROM & Strength  Insurance/Certification information:  PT Insurance Information: [facility] Medicare (Med Nec)  Physician Information:  Nora Patel MD   Plan of care signed (Y/N): [x]  Yes  []  No     Date of Patient follow up with Physician:      Progress Report: []  Yes  [x]  No     Date Range for reporting period:  Beginnin2023  Ending:     Progress report due (10 Rx/or 30 days whichever is less):      Recertification due (POC duration/ or 90 days whichever is less): 3/2/23     Visit # Insurance Allowable Auth required? Date Range   2023   Med Nec  []  Yes  []  No      Latex Allergy:  [x]NO      []YES  Preferred Language for Healthcare:   [x]English       []other:    Functional Scale:        Date assessed:  TO physical FS primary measure score = 57; risk adjusted = 44  22    Pain level:  0/10     History:  Patient stated complaint: Pt presents with c/o right shoulder pain. Had labral repair 7-8 years ago. Having pain and N/T. Rates his pain a 7/10. Has numbness from shoulder into right thumb and finger that has been going for the last couple months. Pain is affecting his ability to sleep. Pt is right hand dominant. Right arm feels weak. Pt is retired but does help his friend at his work where is uses a  for multiple hours per day that is extremely powerful. At times develops neck pain when using the .      SUBJECTIVE: Pt reports having no numbness and tingling in his right hand but has adjusted the nozzle on handle which has taken that away. Does report some numbness when he sleeps on his side in his hands but otherwise doesn't have it. Has been stretching but not using wt recently. OBJECTIVE: 1/6/22:   Supine Shoulder AROM:    Right:    GH Flexion:      175 degrees   GH aBduction:      180 degrees  GH IR (at 90 degrees aBd):     70 degrees  GH ER (at 90 degrees aBd):     87 degrees     RESTRICTIONS/PRECAUTIONS: Previous Right Shoulder Labral Repair 7-8 years ago, Left Total Shoulder Replacement, A-Fib, Depression, Hodgkin's Disease     Exercises/Interventions:  39'  R shldr  Therapeutic Exercises  (71867) 20' Resistance / level Sets/sec Reps Notes   UBE  4' for  1/27   Pulleys   Supine cane flexion    10 flex  10x flex/5'     1/27   Wall wash flex          3 way curls    4# x 10 B    UT stretch  Doorway pec stretch  Doorknob stretch     2x20\"    IR sleeper stretch   15\" 3x 1/27   Bench press  5# 2 10x 1/27   Supine punch 4# 2 10x 1/27   SL ER  SL punch   SL ABD 2# 3 10xea 1/27   Therapeutic Activities (78924) 15       CC: Mid row RT  Shldr Ext  RT  Shldr add  IR  ER  High row  PNF D2 flex   15#  7.5#  5#  5#  5#  15#  5#   2  2  2      2   10  10  10 R  10 R  10 R  10  10 R 1/27   BOSU Black side up    AP, ML, CW/CCW x 10     Scaptions   B 3# 2 10x 1/27          Neuromuscular Re-ed (78181)       B ER/ IR yellow 15\" 3x 1/27                                      Manual Intervention (65568) 10'       R shldr PROM,  GH  mobs grade 1-2/distraction     10' 1/27 IR emphasis with posterior shoulder                                       Pt. Education:  -pt educated on diagnosis, prognosis and expectations for rehab  -all pt questions were answered  Access Code: [email or web address]  URL: [email or web address]  Date: 12/04/2022  Prepared by: Triny Lei    Exercises  Supine Shoulder Flexion Extension AAROM with Dowel - 1 x daily - 7 x weekly - 3 sets - 10 reps  Seated Shoulder Abduction AAROM with Dowel - 1 x daily - 7 x weekly - 3 sets - 10 reps  Shoulder Internal Rotation with Resistance - 1 x daily - 7 x weekly - 3 sets - 10 reps  Shoulder External Rotation with Anchored Resistance - 1 x daily - 7 x weekly - 3 sets - 10 reps    Home Exercise Program:  12/2/22: The following exercises were performed and added to the pt's home program (educated on appropriate frequency, intensity and duration etc.):      Therapeutic Exercise and NMR EXR  [x]  (08938) Provided verbal/tactile cueing for activities related to strengthening, flexibility, endurance, ROM for improvements in  [] LE / Lumbar: LE, proximal hip, and core control with self care, mobility, lifting, ambulation. [x] UE / Cervical: cervical, postural, scapular, scapulothoracic and UE control with self care, reaching, carrying, lifting, house/yardwork, driving, computer work.  [] (76550) Provided verbal/tactile cueing for activities related to improving balance, coordination, kinesthetic sense, posture, motor skill, proprioception to assist with   [] LE / lumbar: LE, proximal hip, and core control in self care, mobility, lifting, ambulation and eccentric single leg control. [] UE / cervical: cervical, scapular, scapulothoracic and UE control with self care, reaching, carrying, lifting, house/yardwork, driving, computer work.      NMR and Therapeutic Activities:    [] (30898 or 73829) Provided verbal/tactile cueing for activities related to improving balance, coordination, kinesthetic sense, posture, motor skill, proprioception and motor activation to allow for proper function of   [] LE: / Lumbar core, proximal hip and LE with self care and ADLs  [] UE / Cervical: cervical, postural, scapular, scapulothoracic and UE control with self care, carrying, lifting, driving, computer work.   [] (59096) Gait Re-education- Provided training and instruction to the patient for proper LE, core and proximal hip recruitment and positioning and eccentric body weight control with ambulation re-education including up and down stairs     Home Exercise Program:    [x] (48048) Reviewed/Progressed HEP activities related to strengthening, flexibility, endurance, ROM of   [] LE / Lumbar: core, proximal hip and LE for functional self-care, mobility, lifting and ambulation/stair navigation   [x] UE / Cervical: cervical, postural, scapular, scapulothoracic and UE control with self care, reaching, carrying, lifting, house/yardwork, driving, computer work  [] (95789)Reviewed/Progressed HEP activities related to improving balance, coordination, kinesthetic sense, posture, motor skill, proprioception of   [] LE: core, proximal hip and LE for self care, mobility, lifting, and ambulation/stair navigation    [] UE / Cervical: cervical, postural,  scapular, scapulothoracic and UE control with self care, reaching, carrying, lifting, house/yardwork, driving, computer work    Manual Treatments:  PROM / STM / Oscillations-Mobs:  G-I, II, III, IV (PA's, Inf., Post.)  [] (53848) Provided manual therapy to mobilize LE, proximal hip and/or LS spine soft tissue/joints for the purpose of modulating pain, promoting relaxation,  increasing ROM, reducing/eliminating soft tissue swelling/inflammation/restriction, improving soft tissue extensibility and allowing for proper ROM for normal function with   [] LE / lumbar: self care, mobility, lifting and ambulation. [] UE / Cervical: self care, reaching, carrying, lifting, house/yardwork, driving, computer work. Modalities:  [] (77831) Vasopneumatic compression: Utilized vasopneumatic compression to decrease edema / swelling for the purpose of improving mobility and quad tone / recruitment which will allow for increased overall function including but not limited to self-care, transfers, ambulation, and ascending / descending stairs.        Modalities:      Charges:  Timed Code Treatment Minutes: 45   Total Treatment Minutes: 45     [] EVAL - LOW (09664)   [] EVAL - MOD (54463)  [] EVAL - HIGH (19388)  [] RE-EVAL (36727)  [x] TE [phone] x1      [] Ionto (23235)  [] NMR (64089) x      [] Vaso (66951)  [x] Manual (88283) x1      [] Ultrasound  [x] TA (62090) x  1    [] Mech Traction (37913)  [] Gait Training x     [] ES (un) (50806):   [] Aquatic therapy x   [] Other:   [] Group:     If BWC Please Indicate Time In/Out  CPT Code Time in Time out                                     GOALS:   Patient stated goal: Decrease Pain, Increase Strength   [x] Progressing: [] Met: [] Not Met: [] Adjusted     Therapist goals for Patient:   Short Term Goals: To be achieved in: 2 weeks  1. Independent in HEP and progression per patient tolerance, in order to prevent re-injury. [] Progressing: [x] Met: [] Not Met: [] Adjusted  2. Patient will have a decrease in pain to facilitate improvement in movement, function, and ADLs as indicated by Functional Deficits. [x] Progressing: [] Met: [] Not Met: [] Adjusted     Long Term Goals: To be achieved in: 8 weeks  1. Disability index score of 10% or less for the UEFI or Quick DASH to assist with reaching prior level of function. [x] Progressing: [] Met: [] Not Met: [] Adjusted  2. Patient will demonstrate increased AROM to 180 degrees to allow for proper joint functioning as indicated by patients Functional Deficits. [x] Progressing: [] Met: [] Not Met: [] Adjusted  3. Patient will demonstrate an increase in Strength to 5/5 to allow for proper functional mobility as indicated by patients Functional Deficits. [x] Progressing: [] Met: [] Not Met: [] Adjusted  4. Patient will return to functional activities including overhead lifting without increased symptoms or restriction. [x] Progressing: [] Met: [] Not Met: [] Adjusted    Overall Progression Towards Functional goals/ Treatment Progress Update:  [x] Patient is progressing as expected towards functional goals listed. [] Progression is slowed due to complexities/Impairments listed.   [] Progression has been slowed due to co-morbidities. [] Plan just implemented, too soon to assess goals progression <30days   [] Goals require adjustment due to lack of progress  [] Patient is not progressing as expected and requires additional follow up with physician  [] Other    Persisting Functional Limitations/Impairments:  [x]Sleeping []Sitting               []Standing []Transfers        []Walking []Kneeling               []Stairs []Squatting / bending   [x]ADLs [x]Reaching  [x]Lifting  [x]Housework  [x]Driving [x]Job related tasks  [x]Sports/Recreation []Other:        ASSESSMENT:  Pt reports that his R.shoulder was fatigued with progressions  today but has decreased IR ROM with increased capsular stiffness. Pt challenged with program progressions with emphasizing RTC/ and scapular stability today. Pt has good overall strength but needs cues to not push into pain or compensate with his biceps to perform exercises. Pt program added IR sleeper stretch today which will decrease Rt shoulder tightness. Treatment/Activity Tolerance:  [x] Patient able to complete tx [] Patient limited by fatique  [] Patient limited by pain  [] Patient limited by other medical complications  [] Other:     Prognosis: [x] Good [] Fair  [] Poor    Patient Requires Follow-up: [x] Yes  [] No    Plan for next treatment session:    PLAN:  PT 2x / week for 8 weeks. [x] Continue per plan of care [] Alter current plan (see comments)  [] Plan of care initiated [] Hold pending MD visit [] Discharge    Electronically signed by: Marge Leyden, SHABANA, [id number]     Note: If patient does not return for scheduled/ recommended follow up visits, this note will serve as a discharge from care along with most recent update on progress.

## 2023-02-07 ENCOUNTER — OFFICE VISIT (OUTPATIENT)
Dept: FAMILY MEDICINE CLINIC | Age: 63
End: 2023-02-07
Payer: MEDICARE

## 2023-02-07 VITALS
BODY MASS INDEX: 29.03 KG/M2 | OXYGEN SATURATION: 95 % | WEIGHT: 219 LBS | DIASTOLIC BLOOD PRESSURE: 76 MMHG | HEIGHT: 73 IN | SYSTOLIC BLOOD PRESSURE: 118 MMHG | HEART RATE: 97 BPM

## 2023-02-07 DIAGNOSIS — B96.89 ACUTE BACTERIAL SINUSITIS: Primary | ICD-10-CM

## 2023-02-07 DIAGNOSIS — J01.90 ACUTE BACTERIAL SINUSITIS: Primary | ICD-10-CM

## 2023-02-07 PROCEDURE — 99214 OFFICE O/P EST MOD 30 MIN: CPT | Performed by: FAMILY MEDICINE

## 2023-02-07 RX ORDER — AMOXICILLIN AND CLAVULANATE POTASSIUM 875; 125 MG/1; MG/1
1 TABLET, FILM COATED ORAL 2 TIMES DAILY
Qty: 10 TABLET | Refills: 0 | Status: SHIPPED | OUTPATIENT
Start: 2023-02-07 | End: 2023-02-12

## 2023-02-07 SDOH — ECONOMIC STABILITY: FOOD INSECURITY: WITHIN THE PAST 12 MONTHS, THE FOOD YOU BOUGHT JUST DIDN'T LAST AND YOU DIDN'T HAVE MONEY TO GET MORE.: NEVER TRUE

## 2023-02-07 SDOH — ECONOMIC STABILITY: INCOME INSECURITY: HOW HARD IS IT FOR YOU TO PAY FOR THE VERY BASICS LIKE FOOD, HOUSING, MEDICAL CARE, AND HEATING?: NOT HARD AT ALL

## 2023-02-07 SDOH — ECONOMIC STABILITY: FOOD INSECURITY: WITHIN THE PAST 12 MONTHS, YOU WORRIED THAT YOUR FOOD WOULD RUN OUT BEFORE YOU GOT MONEY TO BUY MORE.: NEVER TRUE

## 2023-02-07 ASSESSMENT — PATIENT HEALTH QUESTIONNAIRE - PHQ9
SUM OF ALL RESPONSES TO PHQ QUESTIONS 1-9: 0
4. FEELING TIRED OR HAVING LITTLE ENERGY: 0
10. IF YOU CHECKED OFF ANY PROBLEMS, HOW DIFFICULT HAVE THESE PROBLEMS MADE IT FOR YOU TO DO YOUR WORK, TAKE CARE OF THINGS AT HOME, OR GET ALONG WITH OTHER PEOPLE: 0
7. TROUBLE CONCENTRATING ON THINGS, SUCH AS READING THE NEWSPAPER OR WATCHING TELEVISION: 0
SUM OF ALL RESPONSES TO PHQ QUESTIONS 1-9: 0
2. FEELING DOWN, DEPRESSED OR HOPELESS: 0
8. MOVING OR SPEAKING SO SLOWLY THAT OTHER PEOPLE COULD HAVE NOTICED. OR THE OPPOSITE, BEING SO FIGETY OR RESTLESS THAT YOU HAVE BEEN MOVING AROUND A LOT MORE THAN USUAL: 0
5. POOR APPETITE OR OVEREATING: 0
SUM OF ALL RESPONSES TO PHQ QUESTIONS 1-9: 0
SUM OF ALL RESPONSES TO PHQ9 QUESTIONS 1 & 2: 0
SUM OF ALL RESPONSES TO PHQ QUESTIONS 1-9: 0
6. FEELING BAD ABOUT YOURSELF - OR THAT YOU ARE A FAILURE OR HAVE LET YOURSELF OR YOUR FAMILY DOWN: 0
9. THOUGHTS THAT YOU WOULD BE BETTER OFF DEAD, OR OF HURTING YOURSELF: 0
1. LITTLE INTEREST OR PLEASURE IN DOING THINGS: 0
3. TROUBLE FALLING OR STAYING ASLEEP: 0

## 2023-02-07 NOTE — PROGRESS NOTES
Chief complaint: Follow-up (cough) and Sinus Problem      SUBJECTIVE:  HPI  Mp Gamboa (:  1960) is a 63 y.o. male with a past medical history of hodkin's lymphoma s/p radiation and chemo with cardiomyopathy who presents with a chief complaint of: f/u cough- which has resolved. Sinus pressure x7 days. Coughing up nasty stuff. Previously had mutliple sinus infections prior to chemo and then they seemed to go away. Had gotten 'immune to amoxicillin' and was put on biaxin or zpack. No fevers, chills, ear pain, ear pressure, tooth pain. He has a headache right now from the pressure in his sinuses. Located underneath his eyes.     Review of Systems:  General: No F/C/NS/fatigue/wt loss   Cardiovascular: No CP  Respiratory: No SOB  GI: No N/V/D/C/abd pain/blood in stool  Neuro: No HA/weakness  Psych: No depressed mood/anxiety  Musculoskeletal: No myalgias    Patient Active Problem List   Diagnosis    Cardiomyopathy ([address]    Shoulder arthritis    Status post total shoulder replacement, left    Coronary atherosclerosis    Diastolic CHF, chronic (HCC)    Hodgkin's disease, nodular sclerosis, of lymph nodes of head, face, and neck (HCC)    Hyperlipidemia    Left bundle branch block (LBBB) on electrocardiogram    Paroxysmal atrial fibrillation (HCC)    Severe single current episode of major depressive disorder, without psychotic features ([address]    Suicide attempt ([address]    Abnormal thyroid function test    Abnormal stress electrocardiogram test    Neuropathy    Male erectile dysfunction    Vitamin D deficiency    Chronic right shoulder pain     Past Medical History:   Diagnosis Date    Allergic rhinitis     Anesthesia complication     INCREASED HEART RATE AFTER PORT PLACEMENT    Arthritis     Atrial fibrillation [facility])     2 episodes:paroxysmal:prior cardiologist.    Coronary atherosclerosis 2015    Depression     under care of psychiatry    Dilated cardiomyopathy ([address]     Hodgkin's disease ([address]      hodgkins stage 4:Dr. Aleen Najjar    Hyperlipidemia     Neuropathy     Prolonged emergence from general anesthesia     X1    S/P colonoscopy 12/27/2019    Dr. Jose Davis drug overdose, intentional self-harm, initial encounter 1/31/2022    Tendonitis     Testicular pain, left 8/21/2012    Viral pharyngitis 11/27/2019    Vitamin D deficiency      Current Outpatient Medications on File Prior to Visit   Medication Sig Dispense Refill    fluticasone (FLONASE) 50 MCG/ACT nasal spray USE 1 SPRAY IN EACH NOSTRIL DAILY 16 g 5    atorvastatin (LIPITOR) 80 MG tablet TAKE 1 TABLET BY MOUTH  DAILY 90 tablet 3    Tadalafil 2.5 MG TABS Take 1 tab po qd prn erectile dysfunction 30 tablet 5    losartan (COZAAR) 25 MG tablet Take 1 tablet by mouth daily 90 tablet 3    metoprolol succinate (TOPROL XL) 25 MG extended release tablet TAKE 1 TABLET BY MOUTH  DAILY 90 tablet 3    vitamin D (ERGOCALCIFEROL) 1.25 MG (67884 UT) CAPS capsule TAKE 1 CAPSULE BY MOUTH  ONCE WEEKLY 13 capsule 3    gabapentin (NEURONTIN) 300 MG capsule Take 1 capsule by mouth 3 times daily. 270 capsule 3    sertraline (ZOLOFT) 100 MG tablet TAKE 1 TABLET BY MOUTH  DAILY 90 tablet 3    aspirin EC 81 MG EC tablet Take 1 tablet by mouth daily 90 tablet 3     No current facility-administered medications on file prior to visit. OBJECTIVE:  /76 (Site: Right Upper Arm, Position: Sitting, Cuff Size: Medium Adult)   Pulse 97   Ht 6' 1\" (1.854 m)   Wt 219 lb (99.3 kg)   SpO2 95%   BMI 28.89 kg/m²      Physical exam:  afebrile, vitals reviewed  Gen:  WD, WN, NAD, A&Ox3, pleasant  Eyes: no conjunctivitis, EOMI, PERRLA. Ears: TM clear b/l, light reflex wnl. No lesions in mouth or lips. Oropharynx slightly erythematous, no tonsilar hypertrophy or exudates  Neck:  Supple, No cervical or submandibular LAD. No obvious thyromegaly. Heart:  RRR, no murmur, rubs, gallops  Lungs:  CTAB, no W/R/R  Abd:  soft, NT/ND      ASSESSMENT/PLAN:  1.  Acute bacterial sinusitis  Acute on chronic. Uncontrolled. Augmentin x5 days, call if not improved and will do longer course. Pt agrees  ENT referral if recurs d/t hx of recurrent sinusitis. -     amoxicillin-clavulanate (AUGMENTIN) 875-125 MG per tablet; Take 1 tablet by mouth 2 times daily for 5 days, Disp-10 tablet, R-0Normal    Return if symptoms worsen or fail to improve. Electronically signed by Koki Bass MD on 2/7/2023 at 10:53 AM.     Please note, portions of this note were completed with a voice recognition program.  Although every effort was made to ensure the accuracy of this automated transcription, some errors in transcription may have occurred.

## 2023-02-08 ENCOUNTER — OFFICE VISIT (OUTPATIENT)
Dept: CARDIOLOGY CLINIC | Age: 63
End: 2023-02-08
Payer: MEDICARE

## 2023-02-08 VITALS
HEART RATE: 68 BPM | WEIGHT: 222 LBS | BODY MASS INDEX: 29.29 KG/M2 | SYSTOLIC BLOOD PRESSURE: 130 MMHG | DIASTOLIC BLOOD PRESSURE: 80 MMHG

## 2023-02-08 DIAGNOSIS — E78.5 HYPERLIPIDEMIA, UNSPECIFIED HYPERLIPIDEMIA TYPE: ICD-10-CM

## 2023-02-08 DIAGNOSIS — R93.1 ABNORMAL ECHOCARDIOGRAM: ICD-10-CM

## 2023-02-08 DIAGNOSIS — I42.0 DILATED CARDIOMYOPATHY (HCC): ICD-10-CM

## 2023-02-08 DIAGNOSIS — I48.0 PAROXYSMAL ATRIAL FIBRILLATION (HCC): ICD-10-CM

## 2023-02-08 DIAGNOSIS — I50.22 CHRONIC SYSTOLIC CONGESTIVE HEART FAILURE (HCC): Primary | ICD-10-CM

## 2023-02-08 PROCEDURE — 99214 OFFICE O/P EST MOD 30 MIN: CPT | Performed by: INTERNAL MEDICINE

## 2023-02-08 ASSESSMENT — ENCOUNTER SYMPTOMS
COUGH: 0
SHORTNESS OF BREATH: 0
ABDOMINAL DISTENTION: 0
CHOKING: 0
APNEA: 0
CHEST TIGHTNESS: 0

## 2023-02-08 NOTE — PROGRESS NOTES
Subjective:      Patient ID: Gold Reeder is a 63 y.o. male. Follow up for abn echo/cardiomyopathy/hyperlipid/afib. Sinus issues/congestion. Better on antibiotics. Remains active. No exertional sx. Remains stable. No sob. Rhythm stable. No syncope. No edema. No pnd/orthopnea. No  palps. Past Medical History:   Diagnosis Date    Allergic rhinitis     Anesthesia complication     INCREASED HEART RATE AFTER PORT PLACEMENT    Arthritis     Atrial fibrillation [facility])     2 episodes:paroxysmal:prior cardiologist.    Coronary atherosclerosis 6/24/2015    Depression     under care of psychiatry    [address]     Hodgkin's disease ([facility]     2000 hodgkins stage 4:Dr. Killian [facility]    Hyperlipidemia     Neuropathy     Prolonged emergence from general anesthesia     X1    S/P colonoscopy 12/27/2019    Dr. Coral Cottrell drug overdose, intentional self-harm, initial encounter 1/31/2022    Tendonitis     Testicular pain, left 8/21/2012    Viral pharyngitis 11/27/2019    Vitamin D deficiency      Past Surgical History:   Procedure Laterality Date    COLONOSCOPY  2009    Nml per pt'.     ENDOSCOPY, COLON, DIAGNOSTIC      HERNIA REPAIR      2    KNEE SURGERY Right     OTHER SURGICAL HISTORY Left     thumb surgery    REPLACEMENT SHOULDER TOTAL Left 10/3/2019    LEFT TOTAL SHOULDER ARTHROPLASTY WITH INTERSCALENE BLOCK FOR PAIN CONTROL   [facility] performed by Millicent Morgan MD at [address] [facility] ARTHROSCOPY      SHOULDER SURGERY Bilateral     rt shoulder LABRUM ROTATOR CUFF, LEFT ARTHROSCOPY    TUNNELED VENOUS PORT PLACEMENT      REMOVED     Social History     Socioeconomic History    Marital status:      Spouse name: Not on file    Number of children: Not on file    Years of education: Not on file    Highest education level: Not on file   Occupational History    Occupation: [employer]   Tobacco Use    Smoking status: Never    Smokeless tobacco: Never   Vaping Use    Vaping Use: Never used   Substance and Sexual Activity    Alcohol use: Yes     Alcohol/week: 0.0 standard drinks     Comment: drinks daily, drinks rum and diet about 1/3 of a bottle daily, occasionally beer    Drug use: No    Sexual activity: Yes     Comment: - 3 children    Other Topics Concern    Not on file   Social History Narrative    Not on file     Social Determinants of Health     Financial Resource Strain: Low Risk     Difficulty of Paying Living Expenses: Not hard at all   Food Insecurity: No Food Insecurity    Worried About [address] in the Last Year: Never true    [address] [employer] in the Last Year: Never true   Transportation Needs: Unknown    Lack of Transportation (Medical): Not on file    Lack of Transportation (Non-Medical): No   Physical Activity: Not on file   Stress: Not on file   Social Connections: Not on file   Intimate Partner Violence: Not on file   Housing Stability: Unknown    Unable to Pay for Housing in the Last Year: Not on file    Number of Places Lived in the Last Year: Not on file    Unstable Housing in the Last Year: No     FH reviewed,  No FH cardiac issues. Vitals:    02/08/23 1320   BP: 130/80   Pulse: 68       Wt 222 ( up 7 lbs)        Review of Systems   Constitutional:  Negative for activity change and fatigue. Respiratory:  Negative for apnea, cough, choking, chest tightness and shortness of breath. Cardiovascular:  Negative for chest pain, palpitations and leg swelling. No PND or orthopnea. No tachycardia. Gastrointestinal:  Negative for abdominal distention. Musculoskeletal:  Negative for myalgias. Neurological:  Negative for dizziness, syncope and light-headedness. Psychiatric/Behavioral:  Negative for agitation, behavioral problems and confusion. All other systems reviewed and are negative. Objective:   Physical Exam  Constitutional:       General: He is not in acute distress. Appearance: He is well-developed.    HENT:      Head: Normocephalic and atraumatic. Eyes:      General:         Right eye: No discharge. Left eye: No discharge. Neck:      Vascular: No JVD. Cardiovascular:      Rate and Rhythm: Normal rate and regular rhythm. Heart sounds: Normal heart sounds. No gallop. Pulmonary:      Effort: Pulmonary effort is normal. No respiratory distress. Breath sounds: Normal breath sounds. No stridor. No wheezing or rales. Abdominal:      General: Bowel sounds are normal.      Palpations: Abdomen is soft. Tenderness: There is no abdominal tenderness. Musculoskeletal:         General: Normal range of motion. Cervical back: Normal range of motion and neck supple. Skin:     General: Skin is warm and dry. Neurological:      Mental Status: He is alert and oriented to person, place, and time. Psychiatric:         Behavior: Behavior normal.         Thought Content: Thought content normal.       Assessment:       Diagnosis Orders   1. Chronic systolic congestive heart failure ([address]        2. Dilated cardiomyopathy (HCC)        3. Paroxysmal atrial fibrillation ([address]        4. Abnormal echocardiogram        5. Hyperlipidemia, unspecified hyperlipidemia type                Plan:      CV stable. He gives hx afib yrs ago. Rhythm stable. Compensated. No angina. BP good. Reviewed previous records and testing including echo 9/19 and cath 10/19 and echo 6/22 with normal LV function. Will continue Losartan 25 mg daily and toprol 25 mg qd for CHF/cmyop/afib. No changes. Continue to monitor. Follow up 3 months.          Claudetta Canner, MD

## 2023-03-22 DIAGNOSIS — E55.9 VITAMIN D DEFICIENCY: ICD-10-CM

## 2023-03-22 RX ORDER — ERGOCALCIFEROL 1.25 MG/1
CAPSULE ORAL
Qty: 12 CAPSULE | Refills: 0 | Status: SHIPPED | OUTPATIENT
Start: 2023-03-22

## 2023-03-22 NOTE — TELEPHONE ENCOUNTER
Medication:   Requested Prescriptions     Pending Prescriptions Disp Refills    vitamin D (ERGOCALCIFEROL) 1.25 MG (62476 UT) CAPS capsule [Pharmacy Med Name: Vitamin D (Ergocalciferol) 1.25 MG (29032 UT) Oral Capsule] 13 capsule 3     Sig: TAKE 1 CAPSULE BY MOUTH  ONCE WEEKLY       Last Filled:      Patient Phone Number: [phone] (home)     Last appt: 1/31/22  Next appt: Visit date not found    Last Labs DM: No results found for: Rudolph [place]

## 2023-04-18 ENCOUNTER — OFFICE VISIT (OUTPATIENT)
Dept: FAMILY MEDICINE CLINIC | Age: 63
End: 2023-04-18
Payer: MEDICARE

## 2023-04-18 VITALS
BODY MASS INDEX: 29.26 KG/M2 | HEART RATE: 59 BPM | TEMPERATURE: 96.8 F | WEIGHT: 221.8 LBS | SYSTOLIC BLOOD PRESSURE: 122 MMHG | OXYGEN SATURATION: 100 % | DIASTOLIC BLOOD PRESSURE: 76 MMHG

## 2023-04-18 DIAGNOSIS — R07.89 ATYPICAL CHEST PAIN: ICD-10-CM

## 2023-04-18 DIAGNOSIS — S06.0X1A CONCUSSION WITH LOSS OF CONSCIOUSNESS OF 30 MINUTES OR LESS, INITIAL ENCOUNTER: Primary | ICD-10-CM

## 2023-04-18 PROCEDURE — 99214 OFFICE O/P EST MOD 30 MIN: CPT | Performed by: FAMILY MEDICINE

## 2023-04-18 RX ORDER — METHOCARBAMOL 500 MG/1
500 TABLET, FILM COATED ORAL 3 TIMES DAILY PRN
COMMUNITY
Start: 2023-04-13

## 2023-04-18 NOTE — PROGRESS NOTES
Chief complaint: ED Follow-up      SUBJECTIVE:  HPI  Bryon Beal (:  1960) is a 63 y.o. male with a past medical history of Afib who presents with a chief complaint of: f/u ER visit. Fall from ladder on . He has 5 staples on back of head. Dizziness/spinning room with standing up too fast, bending over too quick. Left chest wall still hurts. Takes his breath away. Robaxin helps him sleep but it puts him to sleep. +LOC- woke up in ambulance; doesn't remember that they were getting ready to  Go camping prior to him falling off the ladder from 8-10ft. No worsening headaches. Wondering if he should be driving. Here with his wife.  Flying in July to [place] to see their daughter, who's in the [employer]    Patient Active Problem List   Diagnosis    Cardiomyopathy ([address]    Shoulder arthritis    Status post total shoulder replacement, left    Coronary atherosclerosis    Diastolic CHF, chronic (HCC)    Hodgkin's disease, nodular sclerosis, of lymph nodes of head, face, and neck (HCC)    Hyperlipidemia    Left bundle branch block (LBBB) on electrocardiogram    Paroxysmal atrial fibrillation (HCC)    Severe single current episode of major depressive disorder, without psychotic features ([address]    Suicide attempt ([address]    Abnormal thyroid function test    Abnormal stress electrocardiogram test    Neuropathy    Male erectile dysfunction    Vitamin D deficiency    Chronic right shoulder pain    Concussion wth loss of consciousness of 30 minutes or less     Past Medical History:   Diagnosis Date    Allergic rhinitis     Anesthesia complication     INCREASED HEART RATE AFTER PORT PLACEMENT    Arthritis     Atrial fibrillation ([address]     2 episodes:paroxysmal:prior cardiologist.    Coronary atherosclerosis 2015    Depression     under care of psychiatry    Dilated cardiomyopathy ([address]     Hodgkin's disease ([address]     2000 hodgkins stage 4:Dr. Saintclair Chou    Hyperlipidemia     Neuropathy     Prolonged emergence from general

## 2023-04-19 PROBLEM — S06.0X1A CONCUSSION WTH LOSS OF CONSCIOUSNESS OF 30 MINUTES OR LESS: Status: ACTIVE | Noted: 2023-04-19

## 2023-04-21 ENCOUNTER — OFFICE VISIT (OUTPATIENT)
Dept: FAMILY MEDICINE CLINIC | Age: 63
End: 2023-04-21

## 2023-04-21 DIAGNOSIS — S06.0X1D CONCUSSION WITH LOSS OF CONSCIOUSNESS OF 30 MINUTES OR LESS, SUBSEQUENT ENCOUNTER: Primary | ICD-10-CM

## 2023-04-21 NOTE — PROGRESS NOTES
(FLONASE) 50 MCG/ACT nasal spray USE 1 SPRAY IN EACH NOSTRIL DAILY 16 g 5    atorvastatin (LIPITOR) 80 MG tablet TAKE 1 TABLET BY MOUTH  DAILY 90 tablet 3    Tadalafil 2.5 MG TABS Take 1 tab po qd prn erectile dysfunction 30 tablet 5    losartan (COZAAR) 25 MG tablet Take 1 tablet by mouth daily 90 tablet 3    metoprolol succinate (TOPROL XL) 25 MG extended release tablet TAKE 1 TABLET BY MOUTH  DAILY 90 tablet 3    sertraline (ZOLOFT) 100 MG tablet TAKE 1 TABLET BY MOUTH  DAILY 90 tablet 3    aspirin EC 81 MG EC tablet Take 1 tablet by mouth daily 90 tablet 3    gabapentin (NEURONTIN) 300 MG capsule Take 1 capsule by mouth 3 times daily. 270 capsule 3     No current facility-administered medications on file prior to visit. OBJECTIVE:  There were no vitals taken for this visit. Physical EXAM:  afebrile, vitals reviewed  Gen:  No acute distress, A/Ox3, pleasant; mentating appropriately  Eyes:  Sclerae clear, EOM intact  Neck:  No obvious thyromegaly. Heart:  Regular rate  Lungs:  breathing comfortably on RA, no cough  Abd:  non-distended  Skin: No obvious rashes    ASSESSMENT/PLAN:  1. Concussion with loss of consciousness of 30 minutes or less, subsequent encounter  5 staples removed without complication.  F/u prn      Electronically signed by Dejah Brenner MD on 4/21/2023 at 10:59 AM.

## 2023-04-25 DIAGNOSIS — G62.9 NEUROPATHY: ICD-10-CM

## 2023-04-25 RX ORDER — GABAPENTIN 300 MG/1
300 CAPSULE ORAL 3 TIMES DAILY
Qty: 270 CAPSULE | Refills: 3 | Status: SHIPPED | OUTPATIENT
Start: 2023-04-25 | End: 2024-04-24

## 2023-04-25 NOTE — TELEPHONE ENCOUNTER
Medication:   Requested Prescriptions     Pending Prescriptions Disp Refills    gabapentin (NEURONTIN) 300 MG capsule [Pharmacy Med Name: Gabapentin 300 MG Oral Capsule] 270 capsule 3     Sig: TAKE 1 CAPSULE BY MOUTH 3  TIMES DAILY        Last Filled:  04/20/2022 #[id number]    Patient Phone Number: [phone] (home)     Last appt: 4/21/2023   Next appt: Visit date not found    Last OARRS:   RX Monitoring 9/29/2020   Periodic Controlled Substance Monitoring Possible medication side effects, risk of tolerance/dependence & alternative treatments discussed. ;No signs of potential drug abuse or diversion identified.

## 2023-05-02 ENCOUNTER — OFFICE VISIT (OUTPATIENT)
Dept: FAMILY MEDICINE CLINIC | Age: 63
End: 2023-05-02
Payer: MEDICARE

## 2023-05-02 VITALS
SYSTOLIC BLOOD PRESSURE: 138 MMHG | HEART RATE: 89 BPM | OXYGEN SATURATION: 100 % | WEIGHT: 223.4 LBS | DIASTOLIC BLOOD PRESSURE: 92 MMHG | RESPIRATION RATE: 16 BRPM | BODY MASS INDEX: 29.47 KG/M2 | TEMPERATURE: 97.2 F

## 2023-05-02 DIAGNOSIS — R07.89 ATYPICAL CHEST PAIN: Primary | ICD-10-CM

## 2023-05-02 DIAGNOSIS — M79.661 RIGHT CALF PAIN: ICD-10-CM

## 2023-05-02 PROCEDURE — 99214 OFFICE O/P EST MOD 30 MIN: CPT | Performed by: FAMILY MEDICINE

## 2023-05-02 RX ORDER — TRAMADOL HYDROCHLORIDE 50 MG/1
50 TABLET ORAL DAILY PRN
Qty: 10 TABLET | Refills: 0 | Status: SHIPPED | OUTPATIENT
Start: 2023-05-02 | End: 2023-05-12

## 2023-05-02 NOTE — PROGRESS NOTES
Chief complaint: Follow-up (From fall off ladder .  Still having right calf pain, knot, dizziness and left side rib area )      SUBJECTIVE:  HPI  Eunice Jarrett (:  1960) is a 63 y.o. male with a past medical history of *** who presents with a chief complaint of:    Patient Active Problem List   Diagnosis    Cardiomyopathy ([address]    Shoulder arthritis    Status post total shoulder replacement, left    Coronary atherosclerosis    Diastolic CHF, chronic ([address]    Hodgkin's disease, nodular sclerosis, of lymph nodes of head, face, and neck (HCC)    Hyperlipidemia    Left bundle branch block (LBBB) on electrocardiogram    Paroxysmal atrial fibrillation (HCC)    Severe single current episode of major depressive disorder, without psychotic features ([address]    Suicide attempt ([address]    Abnormal thyroid function test    Abnormal stress electrocardiogram test    Neuropathy    Male erectile dysfunction    Vitamin D deficiency    Chronic right shoulder pain    Concussion wth loss of consciousness of 30 minutes or less     Past Medical History:   Diagnosis Date    Allergic rhinitis     Anesthesia complication     INCREASED HEART RATE AFTER PORT PLACEMENT    Arthritis     Atrial fibrillation ([address]     2 episodes:paroxysmal:prior cardiologist.    Coronary atherosclerosis 2015    Depression     under care of psychiatry    Dilated cardiomyopathy ([address]     Hodgkin's disease ([address]     2000 hodgkins stage 4:Dr. Naida Venegas    Hyperlipidemia     Neuropathy     Prolonged emergence from general anesthesia     X1    S/P colonoscopy 2019    Dr. Suman Iglesias drug overdose, intentional self-harm, initial encounter 2022    Tendonitis     Testicular pain, left 2012    Viral pharyngitis 2019    Vitamin D deficiency      Current Outpatient Medications on File Prior to Visit   Medication Sig Dispense Refill    gabapentin (NEURONTIN) 300 MG capsule TAKE 1 CAPSULE BY MOUTH 3  TIMES DAILY 270 capsule 3

## 2023-05-02 NOTE — PROGRESS NOTES
Chief complaint: Follow-up (From fall off ladder . Still having right calf pain, knot, dizziness and left side rib area )      SUBJECTIVE:  JEOVANY Hargrove (:  1960) is a 63 y.o. male with a past medical history of cardiomyopathy and diastolic CHF who presents with a chief complaint of: Right calf knot- bruised here from the fall. The cut is healing well. He has large bruise. Stopped taking aspirin after the ER visit. No redness, swelling in his calf or foot. He has pain with touching the healing bruise where the knot is. Pain is not there at rest or when no one touches it. f/u left rib chest wall that is still driving him nuts. Occurs with certain movements. Tried OTC pain relief. Homeopathiic regimen- Ronny Sotelo is active ingredient. His mom's hydrocodone took the edge off  Occasional dizziness that is improving.  Will occur when getting up from sitting, but not as often or as long lasting or as severe      Patient Active Problem List   Diagnosis    Cardiomyopathy ([address]    Shoulder arthritis    Status post total shoulder replacement, left    Coronary atherosclerosis    Diastolic CHF, chronic (HCC)    Hodgkin's disease, nodular sclerosis, of lymph nodes of head, face, and neck (HCC)    Hyperlipidemia    Left bundle branch block (LBBB) on electrocardiogram    Paroxysmal atrial fibrillation (HCC)    Severe single current episode of major depressive disorder, without psychotic features ([address]    Suicide attempt ([address]    Abnormal thyroid function test    Abnormal stress electrocardiogram test    Neuropathy    Male erectile dysfunction    Vitamin D deficiency    Chronic right shoulder pain    Concussion wth loss of consciousness of 30 minutes or less     Past Medical History:   Diagnosis Date    Allergic rhinitis     Anesthesia complication     INCREASED HEART RATE AFTER PORT PLACEMENT    Arthritis     Atrial fibrillation ([address]     2 episodes:paroxysmal:prior cardiologist.    Coronary atherosclerosis

## 2023-05-10 ENCOUNTER — OFFICE VISIT (OUTPATIENT)
Dept: CARDIOLOGY CLINIC | Age: 63
End: 2023-05-10
Payer: MEDICARE

## 2023-05-10 VITALS
DIASTOLIC BLOOD PRESSURE: 60 MMHG | BODY MASS INDEX: 29.16 KG/M2 | HEART RATE: 80 BPM | SYSTOLIC BLOOD PRESSURE: 110 MMHG | WEIGHT: 221 LBS

## 2023-05-10 DIAGNOSIS — I42.0 DILATED CARDIOMYOPATHY (HCC): ICD-10-CM

## 2023-05-10 DIAGNOSIS — I50.22 CHRONIC SYSTOLIC CONGESTIVE HEART FAILURE (HCC): Primary | ICD-10-CM

## 2023-05-10 DIAGNOSIS — I48.0 PAROXYSMAL ATRIAL FIBRILLATION (HCC): ICD-10-CM

## 2023-05-10 DIAGNOSIS — R93.1 ABNORMAL ECHOCARDIOGRAM: ICD-10-CM

## 2023-05-10 DIAGNOSIS — E78.5 HYPERLIPIDEMIA, UNSPECIFIED HYPERLIPIDEMIA TYPE: ICD-10-CM

## 2023-05-10 PROCEDURE — 99214 OFFICE O/P EST MOD 30 MIN: CPT | Performed by: INTERNAL MEDICINE

## 2023-05-10 ASSESSMENT — ENCOUNTER SYMPTOMS
SHORTNESS OF BREATH: 0
CHOKING: 0
COUGH: 0
ABDOMINAL DISTENTION: 0
CHEST TIGHTNESS: 0
APNEA: 0

## 2023-05-10 NOTE — PROGRESS NOTES
Subjective:      Patient ID: Gerard Ma is a 63 y.o. male. Follow up for abn echo/cardiomyopathy/hyperlipid/afib. Fall off roof. 3 wks ago. Still recovering. No exertional sx. Remains stable. No sob. Rhythm stable. No syncope. No edema. No pnd/orthopnea. No  palps. Past Medical History:   Diagnosis Date    Allergic rhinitis     Anesthesia complication     INCREASED HEART RATE AFTER PORT PLACEMENT    Arthritis     Atrial fibrillation [facility])     2 episodes:paroxysmal:prior cardiologist.    Coronary atherosclerosis 6/24/2015    Depression     under care of psychiatry    Dilated cardiomyopathy [address]     Hodgkin's disease ([facility]     2000 hodgkins stage 4:Dr. Tushar Alvarado    Hyperlipidemia     Neuropathy     Prolonged emergence from general anesthesia     X1    S/P colonoscopy 12/27/2019    Dr. Marylee Rave drug overdose, intentional self-harm, initial encounter 1/31/2022    Tendonitis     Testicular pain, left 8/21/2012    Viral pharyngitis 11/27/2019    Vitamin D deficiency      Past Surgical History:   Procedure Laterality Date    COLONOSCOPY  2009    Nml per pt'.     ENDOSCOPY, COLON, DIAGNOSTIC      HERNIA REPAIR      2    KNEE SURGERY Right     OTHER SURGICAL HISTORY Left     thumb surgery    REPLACEMENT SHOULDER TOTAL Left 10/3/2019    LEFT TOTAL SHOULDER ARTHROPLASTY WITH INTERSCALENE BLOCK FOR PAIN CONTROL   [facility]      SHOULDER SURGERY Bilateral     rt shoulder LABRUM ROTATOR CUFF, LEFT ARTHROSCOPY    TUNNELED VENOUS PORT PLACEMENT      REMOVED     Social History     Socioeconomic History    Marital status:      Spouse name: Not on file    Number of children: Not on file    Years of education: Not on file    Highest education level: Not on file   Occupational History    Occupation: [employer]   Tobacco Use    Smoking status: Never    Smokeless tobacco: Never   Vaping Use    Vaping Use: Never used

## 2023-05-19 DIAGNOSIS — E55.9 VITAMIN D DEFICIENCY: ICD-10-CM

## 2023-05-19 RX ORDER — ERGOCALCIFEROL 1.25 MG/1
CAPSULE ORAL
Qty: 12 CAPSULE | Refills: 3 | Status: SHIPPED | OUTPATIENT
Start: 2023-05-19

## 2023-05-19 NOTE — TELEPHONE ENCOUNTER
Medication:   Requested Prescriptions     Pending Prescriptions Disp Refills    vitamin D (ERGOCALCIFEROL) 1.25 MG (79010 UT) CAPS capsule [Pharmacy Med Name: Vitamin D (Ergocalciferol) 1.25 MG (82659 UT) Oral Capsule] 12 capsule 3     Sig: TAKE 1 CAPSULE BY MOUTH ONCE  WEEKLY        Last Filled:  3/22/2023 12 caps 0 refills     Patient Phone Number: [phone] (home)     Last appt: 5/2/2023   Next appt: Visit date not found    Last OARRS:   RX Monitoring 9/29/2020   Periodic Controlled Substance Monitoring Possible medication side effects, risk of tolerance/dependence & alternative treatments discussed. ;No signs of potential drug abuse or diversion identified.

## 2023-05-26 DIAGNOSIS — F33.0 MILD EPISODE OF RECURRENT MAJOR DEPRESSIVE DISORDER (HCC): ICD-10-CM

## 2023-05-26 RX ORDER — SERTRALINE HYDROCHLORIDE 100 MG/1
100 TABLET, FILM COATED ORAL DAILY
Qty: 90 TABLET | Refills: 3 | Status: SHIPPED | OUTPATIENT
Start: 2023-05-26

## 2023-05-26 NOTE — TELEPHONE ENCOUNTER
sertraline (ZOLOFT) 100 MG tablet [[id number]]     Order Details  Dose, Route, Frequency: As Directed   Dispense Quantity: 90 tablet Refills: 3    Note to Pharmacy: Requesting 1 year supply         Sig: TAKE 1 TABLET BY MOUTH  DAILY         Start Date: 09/02/21 End Date: --   Written Date: 09/02/21

## 2023-05-26 NOTE — TELEPHONE ENCOUNTER
Medication:   Requested Prescriptions     Pending Prescriptions Disp Refills    sertraline (ZOLOFT) 100 MG tablet 90 tablet 3     Sig: Take 1 tablet by mouth daily        Last Filled:  9/21/2021 90 tabs 3 refills     Patient Phone Number: [phone] (home)     Last appt: 5/2/2023   Next appt: Visit date not found    Last OARRS:   RX Monitoring 9/29/2020   Periodic Controlled Substance Monitoring Possible medication side effects, risk of tolerance/dependence & alternative treatments discussed. ;No signs of potential drug abuse or diversion identified.

## 2023-05-26 NOTE — TELEPHONE ENCOUNTER
Patient requesting a 90-day supply be sent to. ..   [facility] [address] - F [phone]

## 2023-08-17 ENCOUNTER — OFFICE VISIT (OUTPATIENT)
Dept: CARDIOLOGY CLINIC | Age: 63
End: 2023-08-17
Payer: MEDICARE

## 2023-08-17 VITALS
WEIGHT: 216.4 LBS | DIASTOLIC BLOOD PRESSURE: 72 MMHG | HEART RATE: 60 BPM | BODY MASS INDEX: 28.55 KG/M2 | SYSTOLIC BLOOD PRESSURE: 110 MMHG

## 2023-08-17 DIAGNOSIS — I50.22 CHRONIC SYSTOLIC CONGESTIVE HEART FAILURE (HCC): Primary | ICD-10-CM

## 2023-08-17 DIAGNOSIS — R93.1 ABNORMAL ECHOCARDIOGRAM: ICD-10-CM

## 2023-08-17 DIAGNOSIS — I48.0 PAROXYSMAL ATRIAL FIBRILLATION (HCC): ICD-10-CM

## 2023-08-17 DIAGNOSIS — E78.5 HYPERLIPIDEMIA, UNSPECIFIED HYPERLIPIDEMIA TYPE: ICD-10-CM

## 2023-08-17 DIAGNOSIS — I42.0 DILATED CARDIOMYOPATHY (HCC): ICD-10-CM

## 2023-08-17 PROCEDURE — 99213 OFFICE O/P EST LOW 20 MIN: CPT | Performed by: INTERNAL MEDICINE

## 2023-08-17 ASSESSMENT — ENCOUNTER SYMPTOMS
SHORTNESS OF BREATH: 0
CHEST TIGHTNESS: 0
CHOKING: 0
APNEA: 0
COUGH: 0
ABDOMINAL DISTENTION: 0

## 2023-08-17 NOTE — PROGRESS NOTES
Subjective:      Patient ID: Candis Salvador is a 66 y.o. male. Follow up for abn echo/cardiomyopathy/hyperlipid/afib. Doing well. Exercising. Loosing wt. No exertional sx. Remains stable. No sob. Rhythm stable. No syncope. No edema. No pnd/orthopnea. No  palps. Past Medical History:   Diagnosis Date    Allergic rhinitis     Anesthesia complication     INCREASED HEART RATE AFTER PORT PLACEMENT    Arthritis     Atrial fibrillation [facility])     2 episodes:paroxysmal:prior cardiologist.    Coronary atherosclerosis 6/24/2015    Depression     under care of psychiatry    Dilated cardiomyopathy ([facility])     Hodgkin's disease ([facility])     2000 hodgkins stage 4:Dr. Suzette Baez    Hyperlipidemia     Neuropathy     Prolonged emergence from general anesthesia     X1    S/P colonoscopy 12/27/2019    Dr. Hakan Langford drug overdose, intentional self-harm, initial encounter 1/31/2022    Tendonitis     Testicular pain, left 8/21/2012    Viral pharyngitis 11/27/2019    Vitamin D deficiency      Past Surgical History:   Procedure Laterality Date    COLONOSCOPY  2009    Nml per pt'.     ENDOSCOPY, COLON, DIAGNOSTIC      HERNIA REPAIR      2    KNEE SURGERY Right     OTHER SURGICAL HISTORY Left     thumb surgery    REPLACEMENT SHOULDER TOTAL Left 10/3/2019    LEFT TOTAL SHOULDER ARTHROPLASTY WITH INTERSCALENE BLOCK FOR PAIN CONTROL   [facility] performed by Ju Saenz MD at [address] [place] [address] ARTHROSCOPY      SHOULDER SURGERY Bilateral     rt shoulder LABRUM ROTATOR CUFF, LEFT ARTHROSCOPY    TUNNELED VENOUS PORT PLACEMENT      REMOVED     Social History     Socioeconomic History    Marital status:      Spouse name: Not on file    Number of children: Not on file    Years of education: Not on file    Highest education level: Not on file   Occupational History    Occupation: [employer]   Tobacco Use    Smoking status: Never    Smokeless tobacco: Never   Vaping Use    Vaping Use: Never used

## 2023-08-31 ENCOUNTER — OFFICE VISIT (OUTPATIENT)
Dept: FAMILY MEDICINE CLINIC | Age: 63
End: 2023-08-31

## 2023-08-31 VITALS
TEMPERATURE: 97.2 F | SYSTOLIC BLOOD PRESSURE: 132 MMHG | RESPIRATION RATE: 16 BRPM | OXYGEN SATURATION: 98 % | HEART RATE: 70 BPM | BODY MASS INDEX: 28.52 KG/M2 | DIASTOLIC BLOOD PRESSURE: 88 MMHG | WEIGHT: 216.2 LBS

## 2023-08-31 DIAGNOSIS — C81.11 HODGKIN'S DISEASE, NODULAR SCLEROSIS, OF LYMPH NODES OF HEAD, FACE, AND NECK (HCC): ICD-10-CM

## 2023-08-31 DIAGNOSIS — G56.21 CUBITAL TUNNEL SYNDROME ON RIGHT: ICD-10-CM

## 2023-08-31 DIAGNOSIS — M25.511 ACUTE PAIN OF RIGHT SHOULDER: Primary | ICD-10-CM

## 2023-08-31 DIAGNOSIS — J30.1 SEASONAL ALLERGIC RHINITIS DUE TO POLLEN: ICD-10-CM

## 2023-08-31 DIAGNOSIS — N52.8 OTHER MALE ERECTILE DYSFUNCTION: ICD-10-CM

## 2023-08-31 RX ORDER — FLUTICASONE PROPIONATE 50 MCG
SPRAY, SUSPENSION (ML) NASAL
Qty: 16 G | Refills: 5 | Status: SHIPPED | OUTPATIENT
Start: 2023-08-31

## 2023-08-31 RX ORDER — TADALAFIL 2.5 MG/1
TABLET ORAL
Qty: 30 TABLET | Refills: 5 | Status: SHIPPED | OUTPATIENT
Start: 2023-08-31

## 2023-08-31 NOTE — PROGRESS NOTES
complaining it feels numb/tingly at the time), <2 sec distal capillary refill  (E) Strength: No asymmetrical weakness with eccentric load of rotator cuff; 5/5 supraspinatous, 5/5 Infraspinatous/Teres Minor, and 5/5 Subscapularis   (F) Provocative tests:   Impingement Tests: Hawkin's - no pain. Neer's - no pain. Biceps Tendon: Speeds - no pain. ASSESSMENT/PLAN:  1. Acute pain of right shoulder  New, uncontrolled. Hx of labral repair approx 15yrs ago. RC tendonitis on exam. Recommend physical therapy. F/u if persists, will need MRI and ortho referral at that point. Prefers orthocincy to [address] ([facility]  2. Seasonal allergic rhinitis due to pollen  Est, stable. Continue current therapy  -     fluticasone (FLONASE) 50 MCG/ACT nasal spray; USE 1 SPRAY IN EACH NOSTRIL DAILY, Disp-16 g, R-5Normal  3. Male erectile dysfunction  Est, stable. Continue current therapy  -     Tadalafil 2.5 MG TABS; Take 1 tab po qd prn erectile dysfunction, Disp-30 tablet, R-5Normal  4. Cubital tunnel syndrome on right  New, uncontrolled. 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 Recommend decreasing that work or taking 1-2weeks off to heal. May also need PT for this as well. If persists, will get EMG/NCV and refer to ortho  -     [facility] ([facility]  5. Hodgkin's disease, nodular sclerosis, of lymph nodes of head, face, and neck (HCC)  Est, stable. In remission. 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 Continue f/u q3mos with cards, Dr. Arun Murphy    Return if symptoms worsen or fail to improve.     Electronically signed by Olivia Downs MD on 8/31/2023 at 9:43 AM.

## 2023-09-05 DIAGNOSIS — N52.8 OTHER MALE ERECTILE DYSFUNCTION: ICD-10-CM

## 2023-09-05 RX ORDER — TADALAFIL 2.5 MG/1
TABLET ORAL
Qty: 30 TABLET | Refills: 5 | Status: CANCELLED | OUTPATIENT
Start: 2023-09-05

## 2023-09-05 RX ORDER — TADALAFIL 5 MG/1
2.5 TABLET ORAL PRN
Qty: 30 TABLET | Refills: 3 | Status: SHIPPED | OUTPATIENT
Start: 2023-09-05

## 2023-09-05 NOTE — TELEPHONE ENCOUNTER
Medication:   Requested Prescriptions     Pending Prescriptions Disp Refills    Tadalafil 2.5 MG TABS 30 tablet 5     Sig: Take 1 tab po qd prn erectile dysfunction     Last Filled:  8/31/23 PHARMACY DOES NOT HAVE 2.5 MG, ASKING IF CAN BE FILLED 5 MG. PATIENT STATES HE IS LEAVING ON VACATION IN A FEW HOURS    Last appt: 8/31/2023   Next appt: Visit date not found    Last OARRS:   RX Monitoring 9/29/2020   Periodic Controlled Substance Monitoring Possible medication side effects, risk of tolerance/dependence & alternative treatments discussed. ;No signs of potential drug abuse or diversion identified.

## 2023-09-18 ENCOUNTER — HOSPITAL ENCOUNTER (OUTPATIENT)
Dept: PHYSICAL THERAPY | Age: 63
Setting detail: THERAPIES SERIES
Discharge: HOME | End: 2023-09-18
Attending: FAMILY MEDICINE
Payer: MEDICARE

## 2023-09-18 PROCEDURE — 97140 MANUAL THERAPY 1/> REGIONS: CPT | Performed by: PHYSICAL THERAPIST

## 2023-09-18 PROCEDURE — 97161 PT EVAL LOW COMPLEX 20 MIN: CPT | Performed by: PHYSICAL THERAPIST

## 2023-09-18 PROCEDURE — 97110 THERAPEUTIC EXERCISES: CPT | Performed by: PHYSICAL THERAPIST

## 2023-09-18 NOTE — FLOWSHEET NOTE
Patient scheduled for 8/24 @ 10:00   Treatment/Activity Tolerance:  [x] Patient able to complete tx [] Patient limited by fatique  [] Patient limited by pain  [] Patient limited by other medical complications  [] Other:     Prognosis:  [x] Good [] Fair  [] Poor    Patient Requires Follow-up: [x] Yes  [] No    Return to Play:    [x]  N/A     []  Stage 1: Intro to Strength   []  Stage 2: Dynamic Strength and Intro to Plyometrics   []  Stage 3: Advanced Plyometrics and Intro to Throwing   []  Stage 4: Sport specific Training/Return to Sport     []  Ready to Return to Play, [employer]   []  Not Ready for Return to Sports   Comments:      Plan for next treatment session:    PLAN: See eval. PT 2x / week for 6 weeks. [] Continue per plan of care [] Alter current plan (see comments)  [x] Plan of care initiated [] Hold pending MD visit [] Discharge    Electronically signed by: Hermann Mckeon, PT MPT [id number]      Note: If patient does not return for scheduled/ recommended follow up visits, this note will serve as a discharge from care along with most recent update on progress.

## 2023-09-22 ENCOUNTER — HOSPITAL ENCOUNTER (OUTPATIENT)
Dept: PHYSICAL THERAPY | Age: 63
Setting detail: THERAPIES SERIES
Discharge: HOME | End: 2023-09-22
Attending: FAMILY MEDICINE
Payer: MEDICARE

## 2023-09-22 PROCEDURE — 97110 THERAPEUTIC EXERCISES: CPT | Performed by: PHYSICAL THERAPIST

## 2023-09-22 PROCEDURE — 97140 MANUAL THERAPY 1/> REGIONS: CPT | Performed by: PHYSICAL THERAPIST

## 2023-09-22 NOTE — FLOWSHEET NOTE
[address]  Phone: [phone]  Fax: [phone]    Physical Therapy Treatment Note/ Progress Report:     Date:  2023    Patient Name:  Jamal Cooper    :  1960  MRN: [id number]  Restrictions/Precautions:    Medical/Treatment Diagnosis Information:  Diagnosis: M25.511 R Shldr Pain, G56.21 R Cubital tunnel syn  Treatment Diagnosis: Cx Radiculopathy, R Shldr pain with weakness  Insurance/Certification information:  PT Insurance Information: [facility] Medicare   $20 co-pay  Physician Information: Sherice Patient, MD  Plan of care signed (Y/N): []  Yes  [x]  No     Date of Patient follow up with Physician:      Progress Report: []  Yes  [x]  No     Date Range for reporting period:  Beginnin23  Ending:     Progress report due (10 Rx/or 30 days whichever is less):      Recertification due (POC duration/ or 90 days whichever is less):      Visit # Insurance Allowable Auth required? Date Range   2 MN []  Yes  []  No PCY     Latex Allergy:  [x]NO      []YES  Preferred Language for Healthcare:   [x]English       []other:    Functional Scale:        Date assessed:  Quick DASH: raw score = 25; dysfunction = 32%   23    Pain level:  2/10     History:Patient reports onset of R posterior shldr pain with NT about 2 months ago. He has a h/o R shldr labral repair. Numbness travels down R UE into the thumb - 3rd digit. Tingling will onset with supine and R Slying. His function is affected. Pt was referred to PT by his PCP. 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 He work duty involves sitting on stool and using a  with R arm.     OBJECTIVE: See eval  Observation:   Test measurements:      RESTRICTIONS/PRECAUTIONS: h/o Hodgkin lymphoma with neuropathy since chemo, 4 shldr Sx, HTN, CHF, Anxiety, Depression     Exercises/Interventions: R UE Cx Radic, R shldr pain w weakness  Therapeutic

## 2023-09-25 ENCOUNTER — HOSPITAL ENCOUNTER (OUTPATIENT)
Dept: PHYSICAL THERAPY | Age: 63
Setting detail: THERAPIES SERIES
Discharge: HOME | End: 2023-09-25
Attending: FAMILY MEDICINE
Payer: MEDICARE

## 2023-09-25 NOTE — PROGRESS NOTES
[address] [facility]    Physical Therapy  Cancellation/No-show Note  Patient Name:  Catherine Alatorre  :  1960   Date:  2023    Cancelled visits to date: 1  No-shows to date: 0    For today's appointment patient:  [x]  Cancelled   []  Rescheduled appointment  []  No-show     Reason given by patient:  []  Patient ill  [x]  Conflicting appointment  []  No transportation    []  Conflict with work  []  No reason given  []  Other:     Comments:      Phone call information:   []  Phone call made today to patient at _ time at number provided:      []  Patient answered, conversation as follows:    []  Patient did not answer, message left as follows:  []  Phone call not made today  [x]  Phone call not needed - pt contacted us to cancel and provided reason for cancellation.      Electronically signed by:  Antonio Cui, [id number]

## 2023-09-28 ENCOUNTER — HOSPITAL ENCOUNTER (OUTPATIENT)
Dept: PHYSICAL THERAPY | Age: 63
Setting detail: THERAPIES SERIES
Discharge: HOME | End: 2023-09-28
Attending: FAMILY MEDICINE
Payer: MEDICARE

## 2023-09-28 PROCEDURE — 97140 MANUAL THERAPY 1/> REGIONS: CPT

## 2023-09-28 PROCEDURE — 97110 THERAPEUTIC EXERCISES: CPT

## 2023-09-28 NOTE — FLOWSHEET NOTE
[address]  Phone: [phone]  Fax: [phone]    Physical Therapy Treatment Note/ Progress Report:     Date:  2023    Patient Name:  Twin Farr    :  1960  MRN: [id number]  Restrictions/Precautions:    Medical/Treatment Diagnosis Information:  Diagnosis: M25.511 R Shldr Pain, G56.21 R Cubital tunnel syn  Treatment Diagnosis: Cx Radiculopathy, R Shldr pain with weakness  Insurance/Certification information:  PT Insurance Information: [facility] Medicare   $20 co-pay  Physician Information: Maki Carrillo MD  Plan of care signed (Y/N): [x]  Yes  []  No     Date of Patient follow up with Physician:      Progress Report: []  Yes  [x]  No     Date Range for reporting period:  Beginnin23  Ending:     Progress report due (10 Rx/or 30 days whichever is less):      Recertification due (POC duration/ or 90 days whichever is less):      Visit # Insurance Allowable Auth required? Date Range   3 MN []  Yes  []  No PCY     Latex Allergy:  [x]NO      []YES  Preferred Language for Healthcare:   [x]English       []other:    Functional Scale:        Date assessed:  Quick DASH: raw score = 25; dysfunction = 32%   23    Pain level:  2/10     History:Patient reports onset of R posterior shldr pain with NT about 2 months ago. He has a h/o R shldr labral repair. Numbness travels down R UE into the thumb - 3rd digit. Tingling will onset with supine and R Slying. His function is affected. Pt was referred to PT by his PCP. SUBJECTIVE: Pt reports that he was feeling really good after last session, then watched the kids which was exhausting. Is good about doing stretches daily, feels like massage has been most helpful.     OBJECTIVE: See eval  Observation:   Test measurements:      RESTRICTIONS/PRECAUTIONS: h/o Hodgkin lymphoma with neuropathy since chemo, 4 shldr Sx, HTN, CHF, Anxiety, Depression     Exercises/Interventions: R UE Cx Radic, R

## 2023-10-02 ENCOUNTER — HOSPITAL ENCOUNTER (OUTPATIENT)
Dept: PHYSICAL THERAPY | Age: 63
Setting detail: THERAPIES SERIES
Discharge: HOME | End: 2023-10-02
Attending: FAMILY MEDICINE
Payer: MEDICARE

## 2023-10-02 PROCEDURE — 97110 THERAPEUTIC EXERCISES: CPT | Performed by: PHYSICAL THERAPIST

## 2023-10-02 PROCEDURE — 97140 MANUAL THERAPY 1/> REGIONS: CPT | Performed by: PHYSICAL THERAPIST

## 2023-10-02 NOTE — FLOWSHEET NOTE
progress  [] Patient is not progressing as expected and requires additional follow up with physician  [] Other    Persisting Functional Limitations/Impairments:  []Sitting []Standing   []Transfers  []Sleeping   [x]Reaching [x]Lifting   [x]ADLs []Housework  []Driving []Job related tasks  []Sports/Recreation []Other:    ASSESSMENT: Pt tolerated today's session well. With relief after IASTM along R UT. Rotation in R shldr remains tight, however his ROM is WNLs. Added mid cx MET to improve his cx rotation. Treatment/Activity Tolerance:  [x] Patient able to complete tx [] Patient limited by fatique  [] Patient limited by pain  [] Patient limited by other medical complications  [] Other:     Prognosis:  [x] Good [] Fair  [] Poor    Patient Requires Follow-up: [x] Yes  [] No    Return to Play:    [x]  N/A     []  Stage 1: Intro to Strength   []  Stage 2: Dynamic Strength and Intro to Plyometrics   []  Stage 3: Advanced Plyometrics and Intro to Throwing   []  Stage 4: Sport specific Training/Return to Sport     []  Ready to Return to Play, [employer]   []  Not Ready for Return to Sports   Comments:      Plan for next treatment session:cont manual    PLAN: See eval. PT 2x / week for 6 weeks. [x] Continue per plan of care [] Alter current plan (see comments)  [] Plan of care initiated [] Hold pending MD visit [] Discharge    Electronically signed by: Reji Luna, PT MPT [id number]      Note: If patient does not return for scheduled/ recommended follow up visits, this note will serve as a discharge from care along with most recent update on progress.

## 2023-10-05 ENCOUNTER — HOSPITAL ENCOUNTER (OUTPATIENT)
Dept: PHYSICAL THERAPY | Age: 63
Setting detail: THERAPIES SERIES
Discharge: HOME | End: 2023-10-05
Attending: FAMILY MEDICINE
Payer: MEDICARE

## 2023-10-05 PROCEDURE — 97110 THERAPEUTIC EXERCISES: CPT

## 2023-10-05 PROCEDURE — 97140 MANUAL THERAPY 1/> REGIONS: CPT

## 2023-10-05 NOTE — FLOWSHEET NOTE
[address] [place]  Phone: [phone]  Fax: [phone]    Physical Therapy Treatment Note/ Progress Report:     Date:  10/5/2023    Patient Name:  Catherine Alatorre    :  1960  MRN: [id number]  Restrictions/Precautions:    Medical/Treatment Diagnosis Information:  Diagnosis: M25.511 R Shldr Pain, G56.21 R Cubital tunnel syn  Treatment Diagnosis: Cx Radiculopathy, R Shldr pain with weakness  Insurance/Certification information:  PT Insurance Information: [facility] Medicare   $20 co-pay  Physician Information: Austin Caceres MD  Plan of care signed (Y/N): [x]  Yes  []  No     Date of Patient follow up with Physician:      Progress Report: []  Yes  [x]  No     Date Range for reporting period:  Beginnin23  Ending:     Progress report due (10 Rx/or 30 days whichever is less):      Recertification due (POC duration/ or 90 days whichever is less):      Visit # Insurance Allowable Auth required? Date Range   5 [place] []  Yes  []  No PCY     Latex Allergy:  [x]NO      []YES  Preferred Language for Healthcare:   [x]English       []other:    Functional Scale:        Date assessed:  Quick DASH: raw score = 25; dysfunction = 32%   23    Pain level:  2/10     History:Patient reports onset of R posterior shldr pain with NT about 2 months ago. He has a h/o R shldr labral repair. Numbness travels down R UE into the thumb - 3rd digit. Tingling will onset with supine and R Slying. His function is affected. Pt was referred to PT by his PCP. SUBJECTIVE: Pt reports that he is groggy today, couldn't sleep last night.  Problem area has been feeling a lot better    OBJECTIVE: See eval  Observation:   Test measurements:  R UE AROM: flex 150, abd 155, IR 70, ER 65    RESTRICTIONS/PRECAUTIONS: h/o Hodgkin lymphoma with neuropathy since chemo, 4 shldr Sx, HTN, CHF, Anxiety, Depression     Exercises/Interventions: R UE Cx Radic, R shldr pain w weakness  Therapeutic

## 2023-10-09 ENCOUNTER — HOSPITAL ENCOUNTER (OUTPATIENT)
Dept: PHYSICAL THERAPY | Age: 63
Setting detail: THERAPIES SERIES
Discharge: HOME | End: 2023-10-09
Attending: FAMILY MEDICINE
Payer: MEDICARE

## 2023-10-09 PROCEDURE — 97140 MANUAL THERAPY 1/> REGIONS: CPT | Performed by: PHYSICAL THERAPIST

## 2023-10-09 PROCEDURE — 97110 THERAPEUTIC EXERCISES: CPT | Performed by: PHYSICAL THERAPIST

## 2023-10-09 NOTE — FLOWSHEET NOTE
[address], [place]  Phone: [phone]  Fax: [phone]    Physical Therapy Treatment Note/ Progress Report:     Date:  10/9/2023    Patient Name:  Elly Huff    :  1960  MRN: [id number]  Restrictions/Precautions:    Medical/Treatment Diagnosis Information:  Diagnosis: M25.511 R Shldr Pain, G56.21 R Cubital tunnel syn  Treatment Diagnosis: Cx Radiculopathy, R Shldr pain with weakness  Insurance/Certification information:  PT Insurance Information: [facility] Medicare   $20 co-pay  Physician Information: Marianna Yuan MD  Plan of care signed (Y/N): [x]  Yes  []  No     Date of Patient follow up with Physician:      Progress Report: []  Yes  [x]  No     Date Range for reporting period:  Beginnin23  Ending:     Progress report due (10 Rx/or 30 days whichever is less):      Recertification due (POC duration/ or 90 days whichever is less):      Visit # Insurance Allowable Auth required? Date Range   6 [place] []  Yes  []  No PCY     Latex Allergy:  [x]NO      []YES  Preferred Language for Healthcare:   [x]English       []other:    Functional Scale:        Date assessed:  Quick DASH: raw score = 25; dysfunction = 32%   23    Pain level:  210     History:Patient reports onset of R posterior shldr pain with NT about 2 months ago. He has a h/o R shldr labral repair. Numbness travels down R UE into the thumb - 3rd digit. Tingling will onset with supine and R Slying. His function is affected. Pt was referred to PT by his PCP. SUBJECTIVE: Pt reports that he is doing well. He was able to tolerate Fowling (football bowling) over the weekend without any c/os afterwards. OBJECTIVE:   Observation: 10/9: decreased CPVM mm guarding. Patient cont to have IR/ER end range tightness.   Test measurements:  R UE AROM: flex 150, abd 155, IR 70, ER 65    RESTRICTIONS/PRECAUTIONS: h/o Hodgkin lymphoma with neuropathy since chemo, 4 shldr Sx, HTN, CHF,

## 2023-10-12 ENCOUNTER — HOSPITAL ENCOUNTER (OUTPATIENT)
Dept: PHYSICAL THERAPY | Age: 63
Setting detail: THERAPIES SERIES
Discharge: HOME | End: 2023-10-12
Attending: FAMILY MEDICINE
Payer: MEDICARE

## 2023-10-12 PROCEDURE — 97140 MANUAL THERAPY 1/> REGIONS: CPT | Performed by: PHYSICAL THERAPIST

## 2023-10-12 PROCEDURE — 97110 THERAPEUTIC EXERCISES: CPT | Performed by: PHYSICAL THERAPIST

## 2023-10-12 NOTE — FLOWSHEET NOTE
endurance, ROM  for improvements in scapular, scapulothoracic and UE control with self care, reaching, carrying, lifting, house/yardwork, driving/computer work.    [] (25823) Provided verbal/tactile cueing for activities related to improving balance, coordination, kinesthetic sense, posture, motor skill, proprioception  to assist with  scapular, scapulothoracic and UE control with self care, reaching, carrying, lifting, house/yardwork, driving/computer work. Therapeutic Activities:    [] (02471 or 22169) Provided verbal/tactile cueing for activities related to improving balance, coordination, kinesthetic sense, posture, motor skill, proprioception and motor activation to allow for proper function of scapular, scapulothoracic and UE control with self care, carrying, lifting, driving/computer work.      Home Exercise Program:    [x] (85673) Reviewed/Progressed HEP activities related to strengthening, flexibility, endurance, ROM of scapular, scapulothoracic and UE control with self care, reaching, carrying, lifting, house/yardwork, driving/computer work  [] (30446) Reviewed/Progressed HEP activities related to improving balance, coordination, kinesthetic sense, posture, motor skill, proprioception of scapular, scapulothoracic and UE control with self care, reaching, carrying, lifting, house/yardwork, driving/computer work      Manual Treatments:  PROM / STM / Oscillations-Mobs:  G-I, II, III, IV (PA's, Inf., Post.)  [x] (83857) Provided manual therapy to mobilize soft tissue/joints of cervical/CT, scapular GHJ and UE for the purpose of modulating pain, promoting relaxation,  increasing ROM, reducing/eliminating soft tissue swelling/inflammation/restriction, improving soft tissue extensibility and allowing for proper ROM for normal function with self care, reaching, carrying, lifting, house/yardwork, driving/computer work    Modalities:      Charges:  Timed Code Treatment Minutes: 45   Total Treatment Minutes: 45

## 2023-11-21 ENCOUNTER — OFFICE VISIT (OUTPATIENT)
Dept: CARDIOLOGY CLINIC | Age: 63
End: 2023-11-21
Payer: MEDICARE

## 2023-11-21 VITALS
HEART RATE: 62 BPM | WEIGHT: 215 LBS | SYSTOLIC BLOOD PRESSURE: 126 MMHG | BODY MASS INDEX: 28.37 KG/M2 | DIASTOLIC BLOOD PRESSURE: 60 MMHG

## 2023-11-21 DIAGNOSIS — I42.0 DILATED CARDIOMYOPATHY (HCC): ICD-10-CM

## 2023-11-21 DIAGNOSIS — I48.0 PAROXYSMAL ATRIAL FIBRILLATION (HCC): ICD-10-CM

## 2023-11-21 DIAGNOSIS — E78.5 HYPERLIPIDEMIA, UNSPECIFIED HYPERLIPIDEMIA TYPE: ICD-10-CM

## 2023-11-21 DIAGNOSIS — I50.22 CHRONIC SYSTOLIC CONGESTIVE HEART FAILURE (HCC): Primary | ICD-10-CM

## 2023-11-21 PROCEDURE — 99214 OFFICE O/P EST MOD 30 MIN: CPT | Performed by: INTERNAL MEDICINE

## 2023-11-21 ASSESSMENT — ENCOUNTER SYMPTOMS
ABDOMINAL DISTENTION: 0
CHEST TIGHTNESS: 0
SHORTNESS OF BREATH: 0
COUGH: 0
APNEA: 0
CHOKING: 0

## 2023-11-21 NOTE — PROGRESS NOTES
Subjective:      Patient ID: Meryle Lamy is a 66 y.o. male. Follow up for abn echo/cardiomyopathy/hyperlipid/afib. Doing well. Exercising. Wt stable. No exertional sx. Remains stable. No sob. Rhythm stable. No syncope. No edema. No pnd/orthopnea. No  palps. Past Medical History:   Diagnosis Date    Allergic rhinitis     Anesthesia complication     INCREASED HEART RATE AFTER PORT PLACEMENT    Arthritis     Atrial fibrillation [facility])     2 episodes:paroxysmal:prior cardiologist.    Coronary atherosclerosis 6/24/2015    Depression     under care of psychiatry    Dilated cardiomyopathy ([address] [facility] [place])     Hodgkin's disease ([address] [facility] [place])     2000 hodgkins stage 4:Dr. Rachele Toth    Hyperlipidemia     Neuropathy     Prolonged emergence from general anesthesia     X1    S/P colonoscopy 12/27/2019    Dr. Vilma Cerna drug overdose, intentional self-harm, initial encounter 1/31/2022    Tendonitis     Testicular pain, left 8/21/2012    Viral pharyngitis 11/27/2019    Vitamin D deficiency      Past Surgical History:   Procedure Laterality Date    COLONOSCOPY  2009    Nml per pt'.     ENDOSCOPY, COLON, DIAGNOSTIC      HERNIA REPAIR      2    KNEE SURGERY Right     OTHER SURGICAL HISTORY Left     thumb surgery    REPLACEMENT SHOULDER TOTAL Left 10/3/2019    LEFT TOTAL SHOULDER ARTHROPLASTY WITH INTERSCALENE BLOCK FOR PAIN CONTROL   [facility] performed by Margaret Mercado MD at [address] [place] [address] ARTHROSCOPY      SHOULDER SURGERY Bilateral     rt shoulder LABRUM ROTATOR CUFF, LEFT ARTHROSCOPY    TUNNELED VENOUS PORT PLACEMENT      REMOVED     Social History     Socioeconomic History    Marital status:      Spouse name: Not on file    Number of children: Not on file    Years of education: Not on file    Highest education level: Not on file   Occupational History    Occupation: [employer] [place]   Tobacco Use    Smoking status: Never    Smokeless tobacco: Never   Vaping Use    Vaping Use: Never used   Substance

## 2023-12-28 ENCOUNTER — TELEPHONE (OUTPATIENT)
Dept: FAMILY MEDICINE CLINIC | Age: 63
End: 2023-12-28

## 2023-12-28 DIAGNOSIS — B96.89 ACUTE BACTERIAL SINUSITIS: Primary | ICD-10-CM

## 2023-12-28 DIAGNOSIS — J01.90 ACUTE BACTERIAL SINUSITIS: Primary | ICD-10-CM

## 2023-12-28 RX ORDER — AMOXICILLIN AND CLAVULANATE POTASSIUM 875; 125 MG/1; MG/1
1 TABLET, FILM COATED ORAL 2 TIMES DAILY
Qty: 10 TABLET | Refills: 0 | Status: SHIPPED | OUTPATIENT
Start: 2023-12-28 | End: 2024-01-02

## 2023-12-28 NOTE — TELEPHONE ENCOUNTER
Pt is currently in [place] and has a bad sinus infection. He would like to know if something can be called in. His symptoms:  Cough, sore throat, drainage, neg Covid test. Thinks it is his yearly sinus infection. [address]  [phone]    Please advise and thank you.

## 2023-12-28 NOTE — TELEPHONE ENCOUNTER
Please advise [Thyroid Normal, No Nodules] : the thyroid was normal and there were no nodules present

## 2024-01-12 ENCOUNTER — OFFICE VISIT (OUTPATIENT)
Dept: FAMILY MEDICINE CLINIC | Age: 64
End: 2024-01-12

## 2024-01-12 VITALS
HEART RATE: 88 BPM | HEIGHT: 73 IN | SYSTOLIC BLOOD PRESSURE: 132 MMHG | BODY MASS INDEX: 27.7 KG/M2 | DIASTOLIC BLOOD PRESSURE: 86 MMHG | WEIGHT: 209 LBS | OXYGEN SATURATION: 96 %

## 2024-01-12 DIAGNOSIS — C81.11 HODGKIN'S DISEASE, NODULAR SCLEROSIS, OF LYMPH NODES OF HEAD, FACE, AND NECK (HCC): ICD-10-CM

## 2024-01-12 DIAGNOSIS — R05.8 POST-VIRAL COUGH SYNDROME: Primary | ICD-10-CM

## 2024-01-12 DIAGNOSIS — I50.32 DIASTOLIC CHF, CHRONIC (HCC): ICD-10-CM

## 2024-01-12 DIAGNOSIS — E55.9 VITAMIN D DEFICIENCY: ICD-10-CM

## 2024-01-12 RX ORDER — PREDNISONE 20 MG/1
20 TABLET ORAL DAILY
Qty: 7 TABLET | Refills: 0 | Status: SHIPPED | OUTPATIENT
Start: 2024-01-12 | End: 2024-01-19

## 2024-01-12 RX ORDER — LOSARTAN POTASSIUM 25 MG/1
25 TABLET ORAL DAILY
Qty: 90 TABLET | Refills: 3 | Status: SHIPPED | OUTPATIENT
Start: 2024-01-12

## 2024-01-12 RX ORDER — ERGOCALCIFEROL 1.25 MG/1
50000 CAPSULE ORAL WEEKLY
Qty: 12 CAPSULE | Refills: 3 | Status: SHIPPED | OUTPATIENT
Start: 2024-01-12

## 2024-01-12 NOTE — PROGRESS NOTES
Chief complaint: Cough      SUBJECTIVE:  HPI  Arturo Green (:  1960) is a 63 y.o. male with a past medical history of hodgkins lymphoma, cardiomyopathy, diastolic HF, paroxysmal AF, MDD who presents with a chief complaint of: sinus infection while in [place], amoxicillin cleared up the sinuses but still having the scratchy throat, post nasal drip, tickle in back of throat. Doesn't feel horrible. Its just aggravating.  Having trouble sleeping d/t PND and tickle in the back of his throat.  Flonase every morning. Afrin later in the day/at night.    Patient Active Problem List   Diagnosis    Cardiomyopathy (HCC)    Shoulder arthritis    Status post total shoulder replacement, left    Coronary atherosclerosis    Diastolic CHF, chronic (HCC)    Hodgkin's disease, nodular sclerosis, of lymph nodes of head, face, and neck (HCC)    Hyperlipidemia    Left bundle branch block (LBBB) on electrocardiogram    Paroxysmal atrial fibrillation (HCC)    Severe single current episode of major depressive disorder, without psychotic features (HCC)    Suicide attempt (HCC)    Abnormal thyroid function test    Abnormal stress electrocardiogram test    Neuropathy    Male erectile dysfunction    Vitamin D deficiency    Chronic right shoulder pain    Concussion wth loss of consciousness of 30 minutes or less    Seasonal allergic rhinitis due to pollen     Past Medical History:   Diagnosis Date    Allergic rhinitis     Anesthesia complication     INCREASED HEART RATE AFTER PORT PLACEMENT    Arthritis     Atrial fibrillation (HCC)     2 episodes:paroxysmal:prior cardiologist.    Coronary atherosclerosis 2015    Depression     under care of psychiatry    Dilated cardiomyopathy (HCC)     Hodgkin's disease (HCC)      hodgkins stage 4:Dr. Gaming    Hyperlipidemia     Neuropathy     Prolonged emergence from general anesthesia     X1    S/P colonoscopy 2019    Dr. Gabbie Dupont    Sleeping drug overdose, intentional self-harm,

## 2024-01-26 DIAGNOSIS — G62.9 NEUROPATHY: ICD-10-CM

## 2024-01-29 RX ORDER — GABAPENTIN 300 MG/1
300 CAPSULE ORAL 3 TIMES DAILY
Qty: 300 CAPSULE | Refills: 0 | Status: SHIPPED | OUTPATIENT
Start: 2024-01-29 | End: 2025-01-28

## 2024-01-29 NOTE — TELEPHONE ENCOUNTER
Medication:   Requested Prescriptions     Pending Prescriptions Disp Refills    gabapentin (NEURONTIN) 300 MG capsule [Pharmacy Med Name: Gabapentin 300 MG Oral Capsule] 300 capsule 2     Sig: Take 1 capsule by mouth 3 times daily.        Last Filled:  04/25/2023 #270 0rf     Patient Phone Number: [phone] (home)     Last appt: 1/12/2024   Next appt: Visit date not found    Last OARRS:       9/29/2020    12:23 PM   RX Monitoring   Periodic Controlled Substance Monitoring Possible medication side effects, risk of tolerance/dependence & alternative treatments discussed.;No signs of potential drug abuse or diversion identified.

## 2024-02-20 ENCOUNTER — OFFICE VISIT (OUTPATIENT)
Dept: CARDIOLOGY CLINIC | Age: 64
End: 2024-02-20
Payer: MEDICARE

## 2024-02-20 VITALS
HEART RATE: 69 BPM | DIASTOLIC BLOOD PRESSURE: 70 MMHG | SYSTOLIC BLOOD PRESSURE: 130 MMHG | BODY MASS INDEX: 28.87 KG/M2 | WEIGHT: 218.8 LBS

## 2024-02-20 DIAGNOSIS — I48.0 PAROXYSMAL ATRIAL FIBRILLATION (HCC): ICD-10-CM

## 2024-02-20 DIAGNOSIS — I50.22 CHRONIC SYSTOLIC CONGESTIVE HEART FAILURE (HCC): Primary | ICD-10-CM

## 2024-02-20 DIAGNOSIS — I42.0 DILATED CARDIOMYOPATHY (HCC): ICD-10-CM

## 2024-02-20 DIAGNOSIS — E78.5 HYPERLIPIDEMIA, UNSPECIFIED HYPERLIPIDEMIA TYPE: ICD-10-CM

## 2024-02-20 PROCEDURE — 99214 OFFICE O/P EST MOD 30 MIN: CPT | Performed by: INTERNAL MEDICINE

## 2024-02-20 ASSESSMENT — ENCOUNTER SYMPTOMS
SHORTNESS OF BREATH: 0
CHEST TIGHTNESS: 0
ABDOMINAL DISTENTION: 0
COUGH: 0
CHOKING: 0
APNEA: 0

## 2024-02-20 NOTE — PROGRESS NOTES
and Sexual Activity    Alcohol use: Yes     Alcohol/week: 0.0 standard drinks of alcohol     Comment: drinks daily, drinks rum and diet about 1/3 of a bottle daily, occasionally beer    Drug use: No    Sexual activity: Yes     Comment: - 3 children    Other Topics Concern    Not on file   Social History Narrative    Not on file     Social Determinants of Health     Financial Resource Strain: Low Risk  (2/7/2023)    Overall Financial Resource Strain (CARDIA)     Difficulty of Paying Living Expenses: Not hard at all   Food Insecurity: Not on file (2/7/2023)   Transportation Needs: Unknown (2/7/2023)    PRAPARE - Transportation     Lack of Transportation (Medical): Not on file     Lack of Transportation (Non-Medical): No   Physical Activity: Inactive (1/31/2022)    Exercise Vital Sign     Days of Exercise per Week: 0 days     Minutes of Exercise per Session: 0 min   Stress: No Stress Concern Present (1/31/2022)    [employer] [place] [employer] - Occupational Stress Questionnaire     Feeling of Stress : Not at all   Social Connections: Socially Integrated (1/31/2022)    Social Connection and Isolation Panel [NHANES]     Frequency of Communication with Friends and Family: More than three times a week     Frequency of Social Gatherings with Friends and Family: Three times a week     Attends [employer] Services: More than 4 times per year     Active Member of Clubs or Organizations: Yes     Attends Club or Organization Meetings: More than 4 times per year     Marital Status:    Intimate Partner Violence: Not on file   Housing Stability: Unknown (2/7/2023)    Housing Stability Vital Sign     Unable to Pay for Housing in the Last Year: Not on file     Number of Places Lived in the Last Year: Not on file     Unstable Housing in the Last Year: No     FH reviewed,  No FH cardiac issues.      Vitals:    02/20/24 1357   BP: 130/70   Pulse: 69         Wt 218        Review of Systems   Constitutional:

## 2024-03-17 SDOH — ECONOMIC STABILITY: FOOD INSECURITY: WITHIN THE PAST 12 MONTHS, YOU WORRIED THAT YOUR FOOD WOULD RUN OUT BEFORE YOU GOT MONEY TO BUY MORE.: NEVER TRUE

## 2024-03-17 SDOH — HEALTH STABILITY: PHYSICAL HEALTH: ON AVERAGE, HOW MANY MINUTES DO YOU ENGAGE IN EXERCISE AT THIS LEVEL?: 60 MIN

## 2024-03-17 SDOH — ECONOMIC STABILITY: FOOD INSECURITY: WITHIN THE PAST 12 MONTHS, THE FOOD YOU BOUGHT JUST DIDN'T LAST AND YOU DIDN'T HAVE MONEY TO GET MORE.: NEVER TRUE

## 2024-03-17 SDOH — HEALTH STABILITY: PHYSICAL HEALTH: ON AVERAGE, HOW MANY DAYS PER WEEK DO YOU ENGAGE IN MODERATE TO STRENUOUS EXERCISE (LIKE A BRISK WALK)?: 1 DAY

## 2024-03-17 ASSESSMENT — LIFESTYLE VARIABLES
HOW OFTEN DURING THE LAST YEAR HAVE YOU NEEDED AN ALCOHOLIC DRINK FIRST THING IN THE MORNING TO GET YOURSELF GOING AFTER A NIGHT OF HEAVY DRINKING: NEVER
HOW MANY STANDARD DRINKS CONTAINING ALCOHOL DO YOU HAVE ON A TYPICAL DAY: 3 OR 4
HOW OFTEN DO YOU HAVE A DRINK CONTAINING ALCOHOL: 4
HOW OFTEN DO YOU HAVE A DRINK CONTAINING ALCOHOL: 2-3 TIMES A WEEK
HOW OFTEN DURING THE LAST YEAR HAVE YOU BEEN UNABLE TO REMEMBER WHAT HAPPENED THE NIGHT BEFORE BECAUSE YOU HAD BEEN DRINKING: NEVER
HOW OFTEN DURING THE LAST YEAR HAVE YOU HAD A FEELING OF GUILT OR REMORSE AFTER DRINKING: NEVER
HOW OFTEN DO YOU HAVE SIX OR MORE DRINKS ON ONE OCCASION: 2
HOW OFTEN DURING THE LAST YEAR HAVE YOU NEEDED AN ALCOHOLIC DRINK FIRST THING IN THE MORNING TO GET YOURSELF GOING AFTER A NIGHT OF HEAVY DRINKING: NEVER
HOW OFTEN DURING THE LAST YEAR HAVE YOU FOUND THAT YOU WERE NOT ABLE TO STOP DRINKING ONCE YOU HAD STARTED: NEVER
HAS A RELATIVE, FRIEND, DOCTOR, OR ANOTHER HEALTH PROFESSIONAL EXPRESSED CONCERN ABOUT YOUR DRINKING OR SUGGESTED YOU CUT DOWN: NO
HOW OFTEN DURING THE LAST YEAR HAVE YOU HAD A FEELING OF GUILT OR REMORSE AFTER DRINKING: NEVER
HOW OFTEN DURING THE LAST YEAR HAVE YOU FAILED TO DO WHAT WAS NORMALLY EXPECTED FROM YOU BECAUSE OF DRINKING: NEVER
HAVE YOU OR SOMEONE ELSE BEEN INJURED AS A RESULT OF YOUR DRINKING: NO
HAVE YOU OR SOMEONE ELSE BEEN INJURED AS A RESULT OF YOUR DRINKING: NO
HOW OFTEN DURING THE LAST YEAR HAVE YOU BEEN UNABLE TO REMEMBER WHAT HAPPENED THE NIGHT BEFORE BECAUSE YOU HAD BEEN DRINKING: NEVER
HAS A RELATIVE, FRIEND, DOCTOR, OR ANOTHER HEALTH PROFESSIONAL EXPRESSED CONCERN ABOUT YOUR DRINKING OR SUGGESTED YOU CUT DOWN: NO
HOW OFTEN DURING THE LAST YEAR HAVE YOU FAILED TO DO WHAT WAS NORMALLY EXPECTED FROM YOU BECAUSE OF DRINKING: NEVER
HOW MANY STANDARD DRINKS CONTAINING ALCOHOL DO YOU HAVE ON A TYPICAL DAY: 2
HOW OFTEN DURING THE LAST YEAR HAVE YOU FOUND THAT YOU WERE NOT ABLE TO STOP DRINKING ONCE YOU HAD STARTED: NEVER

## 2024-03-17 ASSESSMENT — PATIENT HEALTH QUESTIONNAIRE - PHQ9
SUM OF ALL RESPONSES TO PHQ QUESTIONS 1-9: 1
SUM OF ALL RESPONSES TO PHQ9 QUESTIONS 1 & 2: 1
1. LITTLE INTEREST OR PLEASURE IN DOING THINGS: SEVERAL DAYS
SUM OF ALL RESPONSES TO PHQ QUESTIONS 1-9: 1
2. FEELING DOWN, DEPRESSED OR HOPELESS: NOT AT ALL

## 2024-03-19 ENCOUNTER — OFFICE VISIT (OUTPATIENT)
Dept: FAMILY MEDICINE CLINIC | Age: 64
End: 2024-03-19

## 2024-03-19 VITALS
DIASTOLIC BLOOD PRESSURE: 86 MMHG | TEMPERATURE: 96.8 F | BODY MASS INDEX: 28.04 KG/M2 | HEART RATE: 66 BPM | HEIGHT: 73 IN | SYSTOLIC BLOOD PRESSURE: 136 MMHG | OXYGEN SATURATION: 96 % | WEIGHT: 211.6 LBS | RESPIRATION RATE: 16 BRPM

## 2024-03-19 DIAGNOSIS — C81.11 HODGKIN'S DISEASE, NODULAR SCLEROSIS, OF LYMPH NODES OF HEAD, FACE, AND NECK (HCC): ICD-10-CM

## 2024-03-19 DIAGNOSIS — Z00.00 INITIAL MEDICARE ANNUAL WELLNESS VISIT: Primary | ICD-10-CM

## 2024-03-19 DIAGNOSIS — I50.32 DIASTOLIC CHF, CHRONIC (HCC): ICD-10-CM

## 2024-03-19 DIAGNOSIS — I48.0 PAROXYSMAL ATRIAL FIBRILLATION (HCC): ICD-10-CM

## 2024-03-19 DIAGNOSIS — T14.91XA SUICIDE ATTEMPT (HCC): ICD-10-CM

## 2024-03-19 DIAGNOSIS — F33.0 MILD EPISODE OF RECURRENT MAJOR DEPRESSIVE DISORDER (HCC): ICD-10-CM

## 2024-03-19 DIAGNOSIS — J30.1 SEASONAL ALLERGIC RHINITIS DUE TO POLLEN: ICD-10-CM

## 2024-03-19 DIAGNOSIS — G56.21 CUBITAL TUNNEL SYNDROME ON RIGHT: ICD-10-CM

## 2024-03-19 DIAGNOSIS — E78.00 PURE HYPERCHOLESTEROLEMIA: ICD-10-CM

## 2024-03-19 DIAGNOSIS — D68.69 SECONDARY HYPERCOAGULABLE STATE (HCC): ICD-10-CM

## 2024-03-19 DIAGNOSIS — I42.0 DILATED CARDIOMYOPATHY (HCC): ICD-10-CM

## 2024-03-19 PROBLEM — F32.2 SEVERE SINGLE CURRENT EPISODE OF MAJOR DEPRESSIVE DISORDER, WITHOUT PSYCHOTIC FEATURES (HCC): Status: RESOLVED | Noted: 2022-01-31 | Resolved: 2024-03-19

## 2024-03-19 RX ORDER — ATORVASTATIN CALCIUM 80 MG/1
80 TABLET, FILM COATED ORAL DAILY
Qty: 90 TABLET | Refills: 3 | Status: SHIPPED | OUTPATIENT
Start: 2024-03-19

## 2024-03-19 RX ORDER — SERTRALINE HYDROCHLORIDE 100 MG/1
100 TABLET, FILM COATED ORAL DAILY
Qty: 90 TABLET | Refills: 3 | Status: SHIPPED | OUTPATIENT
Start: 2024-03-19

## 2024-03-19 RX ORDER — FLUTICASONE PROPIONATE 50 MCG
SPRAY, SUSPENSION (ML) NASAL
Qty: 16 G | Refills: 5 | Status: SHIPPED | OUTPATIENT
Start: 2024-03-19

## 2024-03-19 SDOH — ECONOMIC STABILITY: INCOME INSECURITY: HOW HARD IS IT FOR YOU TO PAY FOR THE VERY BASICS LIKE FOOD, HOUSING, MEDICAL CARE, AND HEATING?: NOT HARD AT ALL

## 2024-03-19 SDOH — ECONOMIC STABILITY: FOOD INSECURITY: WITHIN THE PAST 12 MONTHS, THE FOOD YOU BOUGHT JUST DIDN'T LAST AND YOU DIDN'T HAVE MONEY TO GET MORE.: NEVER TRUE

## 2024-03-19 SDOH — ECONOMIC STABILITY: FOOD INSECURITY: WITHIN THE PAST 12 MONTHS, YOU WORRIED THAT YOUR FOOD WOULD RUN OUT BEFORE YOU GOT MONEY TO BUY MORE.: NEVER TRUE

## 2024-03-19 ASSESSMENT — PATIENT HEALTH QUESTIONNAIRE - PHQ9
SUM OF ALL RESPONSES TO PHQ QUESTIONS 1-9: 6
9. THOUGHTS THAT YOU WOULD BE BETTER OFF DEAD, OR OF HURTING YOURSELF: NOT AT ALL
10. IF YOU CHECKED OFF ANY PROBLEMS, HOW DIFFICULT HAVE THESE PROBLEMS MADE IT FOR YOU TO DO YOUR WORK, TAKE CARE OF THINGS AT HOME, OR GET ALONG WITH OTHER PEOPLE: NOT DIFFICULT AT ALL
7. TROUBLE CONCENTRATING ON THINGS, SUCH AS READING THE NEWSPAPER OR WATCHING TELEVISION: NOT AT ALL
SUM OF ALL RESPONSES TO PHQ9 QUESTIONS 1 & 2: 0
3. TROUBLE FALLING OR STAYING ASLEEP: NEARLY EVERY DAY
SUM OF ALL RESPONSES TO PHQ QUESTIONS 1-9: 6
1. LITTLE INTEREST OR PLEASURE IN DOING THINGS: NOT AT ALL
2. FEELING DOWN, DEPRESSED OR HOPELESS: NOT AT ALL
6. FEELING BAD ABOUT YOURSELF - OR THAT YOU ARE A FAILURE OR HAVE LET YOURSELF OR YOUR FAMILY DOWN: NOT AT ALL
SUM OF ALL RESPONSES TO PHQ QUESTIONS 1-9: 6
8. MOVING OR SPEAKING SO SLOWLY THAT OTHER PEOPLE COULD HAVE NOTICED. OR THE OPPOSITE, BEING SO FIGETY OR RESTLESS THAT YOU HAVE BEEN MOVING AROUND A LOT MORE THAN USUAL: NOT AT ALL
SUM OF ALL RESPONSES TO PHQ QUESTIONS 1-9: 6
5. POOR APPETITE OR OVEREATING: NOT AT ALL
4. FEELING TIRED OR HAVING LITTLE ENERGY: NEARLY EVERY DAY

## 2024-03-19 NOTE — PROGRESS NOTES
Medicare Annual Wellness Visit    Arturo Green is here for Medicare AWV    Assessment & Plan   Initial Medicare annual wellness visit  Pure hypercholesterolemia  Est, stable. Continue current therapy  -     atorvastatin (LIPITOR) 80 MG tablet; Take 1 tablet by mouth daily, Disp-90 tablet, R-3Requesting 1 year supplyNormal  Cubital tunnel syndrome on right  New. Referral for PT so he can be more active with push ups  -     [facility] [place]  Suicide attempt (HCC)  Est, stable. Continue current therapy    Seasonal allergic rhinitis due to pollen  Est, stable. Continue current therapy  -     fluticasone (FLONASE) 50 MCG/ACT nasal spray; USE 1 SPRAY IN EACH NOSTRIL DAILY, Disp-16 g, R-5Normal  Mild episode of recurrent major depressive disorder (HCC)  Est, stable. Continue current therapy  -     sertraline (ZOLOFT) 100 MG tablet; Take 1 tablet by mouth daily, Disp-90 tablet, R-3Requesting 1 year supplyNormal  Paroxysmal atrial fibrillation (HCC)  Est, stable. Continue current therapy and f/u with cardiology  Secondary hypercoagulable state (HCC)  Est, stable. Continue current therapy and f/u with cardiology  Dilated cardiomyopathy (HCC)  Est, stable. Continue current therapy and f/u with cardiology  Diastolic CHF, chronic (HCC)  Est, stable. Continue current therapy and f/u with cardiology  Hodgkin's disease, nodular sclerosis, of lymph nodes of head, face, and neck (HCC)  Est, stable. Graduated out of f/u with hem/onc    Recommendations for Preventive Services Due: see orders and patient instructions/AVS.  Recommended screening schedule for the next 5-10 years is provided to the patient in written form: see Patient Instructions/AVS.     Return in 1 year (on 3/19/2025).     Subjective   The following acute and/or chronic problems were also addressed today:    Doing well. Wondering if men have iron problems.  Trying to be more active. Can't do push ups. \"Used to do 100 every day and can't now. Has

## 2024-03-20 DIAGNOSIS — C81.11 HODGKIN'S DISEASE, NODULAR SCLEROSIS, OF LYMPH NODES OF HEAD, FACE, AND NECK (HCC): ICD-10-CM

## 2024-03-20 DIAGNOSIS — I50.32 DIASTOLIC CHF, CHRONIC (HCC): ICD-10-CM

## 2024-03-20 LAB
ANION GAP SERPL CALCULATED.3IONS-SCNC: 12 MMOL/L (ref 3–16)
BASOPHILS # BLD: 0 K/UL (ref 0–0.2)
BASOPHILS NFR BLD: 0.5 %
BUN SERPL-MCNC: 14 MG/DL (ref 7–20)
CALCIUM SERPL-MCNC: 10.4 MG/DL (ref 8.3–10.6)
CHLORIDE SERPL-SCNC: 102 MMOL/L (ref 99–110)
CHOLEST SERPL-MCNC: 338 MG/DL (ref 0–199)
CO2 SERPL-SCNC: 27 MMOL/L (ref 21–32)
CREAT SERPL-MCNC: 0.9 MG/DL (ref 0.8–1.3)
DEPRECATED RDW RBC AUTO: 13 % (ref 12.4–15.4)
EOSINOPHIL # BLD: 0.1 K/UL (ref 0–0.6)
EOSINOPHIL NFR BLD: 2.3 %
GFR SERPLBLD CREATININE-BSD FMLA CKD-EPI: >60 ML/MIN/{1.73_M2}
GLUCOSE SERPL-MCNC: 101 MG/DL (ref 70–99)
HCT VFR BLD AUTO: 42.1 % (ref 40.5–52.5)
HDLC SERPL-MCNC: 54 MG/DL (ref 40–60)
HGB BLD-MCNC: 14.5 G/DL (ref 13.5–17.5)
LDLC SERPL CALC-MCNC: 232 MG/DL
LYMPHOCYTES # BLD: 1.9 K/UL (ref 1–5.1)
LYMPHOCYTES NFR BLD: 33.6 %
MCH RBC QN AUTO: 30.8 PG (ref 26–34)
MCHC RBC AUTO-ENTMCNC: 34.5 G/DL (ref 31–36)
MCV RBC AUTO: 89.4 FL (ref 80–100)
MONOCYTES # BLD: 0.7 K/UL (ref 0–1.3)
MONOCYTES NFR BLD: 12 %
NEUTROPHILS # BLD: 3 K/UL (ref 1.7–7.7)
NEUTROPHILS NFR BLD: 51.6 %
PLATELET # BLD AUTO: 315 K/UL (ref 135–450)
PMV BLD AUTO: 8.1 FL (ref 5–10.5)
POTASSIUM SERPL-SCNC: 4.6 MMOL/L (ref 3.5–5.1)
RBC # BLD AUTO: 4.71 M/UL (ref 4.2–5.9)
SODIUM SERPL-SCNC: 141 MMOL/L (ref 136–145)
TRIGL SERPL-MCNC: 259 MG/DL (ref 0–150)
VLDLC SERPL CALC-MCNC: 52 MG/DL
WBC # BLD AUTO: 5.8 K/UL (ref 4–11)

## 2024-03-21 ENCOUNTER — HOSPITAL ENCOUNTER (OUTPATIENT)
Dept: PHYSICAL THERAPY | Age: 64
Setting detail: THERAPIES SERIES
Discharge: HOME | End: 2024-03-21
Payer: MEDICARE

## 2024-03-21 PROCEDURE — 97161 PT EVAL LOW COMPLEX 20 MIN: CPT | Performed by: PHYSICAL THERAPIST

## 2024-03-21 PROCEDURE — 97140 MANUAL THERAPY 1/> REGIONS: CPT | Performed by: PHYSICAL THERAPIST

## 2024-03-21 PROCEDURE — 97110 THERAPEUTIC EXERCISES: CPT | Performed by: PHYSICAL THERAPIST

## 2024-03-21 NOTE — PLAN OF CARE
[facility] - Outpatient Rehabilitation and Therapy [address] [place] [address] office: [phone] fax: [phone]     Physical Therapy Initial Evaluation Certification      Dear Ameena Lindsey MD,    We had the pleasure of evaluating the following patient for physical therapy services at [facility].  A summary of our findings can be found in the initial assessment below.  This includes our plan of care.  If you have any questions or concerns regarding these findings, please do not hesitate to contact me at the office phone number listed above.  Thank you for the referral.     Physician Signature:_______________________________Date:__________________  By signing above (or electronic signature), therapist’s plan is approved by physician       Physical Therapy: TREATMENT/PROGRESS NOTE   Patient: Arturo Green (63 y.o. male)   Examination Date: 2024   :  1960 MRN: [id number]   Visit #: 1   Insurance Allowable Auth Needed   [place] []Yes    []No    Insurance: Payor: [facility] MEDICARE / Plan: [facility] MEDICARE ADVANTAGE / Product Type: *No Product type* /   Insurance ID: [id number] - (Medicare Managed)  Secondary Insurance (if applicable):    Treatment Diagnosis:   B lateral epiconylitis   Medical Diagnosis:  Cubital tunnel syndrome on right [G56.21]   Referring Physician: Ameena Lindsey MD  PCP: Ameena Lindsey MD       Plan of care signed (Y/N):     Date of Patient follow up with Physician:      Progress Report/POC: EVAL today  POC update due: (10 visits /OR AUTH LIMITS, whichever is less)  2024                                             Precautions/ Contra-indications:           Latex allergy:  NO  Pacemaker:    NO  Contraindications for Manipulation: None  Date of Surgery:     Red Flags:  None    C-SSRS Triggered by Intake questionnaire:   [x] No, Questionnaire did not trigger screening.   [] Yes, Patient intake triggered further

## 2024-03-26 ENCOUNTER — HOSPITAL ENCOUNTER (OUTPATIENT)
Dept: PHYSICAL THERAPY | Age: 64
Setting detail: THERAPIES SERIES
Discharge: HOME | End: 2024-03-26
Payer: MEDICARE

## 2024-03-26 PROCEDURE — 97140 MANUAL THERAPY 1/> REGIONS: CPT

## 2024-03-26 PROCEDURE — 97035 APP MDLTY 1+ULTRASOUND EA 15: CPT

## 2024-03-26 PROCEDURE — 97110 THERAPEUTIC EXERCISES: CPT

## 2024-03-26 NOTE — FLOWSHEET NOTE
goals for Patient:   Short Term Goals: To be achieved in: 2 weeks  Independent in HEP and progression per patient tolerance, in order to progress toward full function and prevent re-injury.    Status: [] Progressing: [] Met: [] Not Met: [] Adjusted  Patient will have a decrease in pain to 3/10 to help facilitate improvement in movement, function, and ADLs as indicated by functional deficits.   Status: [] Progressing: [] Met: [] Not Met: [] Adjusted    Long Term Goals: To be achieved in: 3-5 weeks  Disability index score of 25% or less for the Quick DASH to assist with return top prior level of function.   Status: [] Progressing: [] Met: [] Not Met: [] Adjusted  Improve ROM to WNL to allow for proper joint functioning as indicated by patients functional deficits.  Status: [] Progressing: [] Met: [] Not Met: [] Adjusted  Pt to improve strength to 4+/5 or better of wrist extensors and  strength to allow for proper muscle and joint use in functional mobility, ADLs and prior level of function   Status: [] Progressing: [] Met: [] Not Met: [] Adjusted  Patient will return to  Lifting and Tool handling without increased symptoms or restriction to work towards return to prior level of function.        Status: [] Progressing: [] Met: [] Not Met: [] Adjusted  Patient will resume normal work/leisure activities without pain.          Status: [] Progressing: [] Met: [] Not Met: [] Adjusted    TREATMENT PLAN     Plan: Cont POC- Continue emphasis/focus on exercise progression, modulating pain, and increasing ROM. Next visit plan to progress weights, progress reps, and add new exercises     Electronically Signed by Jennifer Guerra, [id number]  Date: 03/26/2024     Note: Portions of this note have been templated and/or copied from initial evaluation, reassessments and prior notes for documentation efficiency.

## 2024-03-28 ENCOUNTER — HOSPITAL ENCOUNTER (OUTPATIENT)
Dept: PHYSICAL THERAPY | Age: 64
Setting detail: THERAPIES SERIES
Discharge: HOME | End: 2024-03-28
Payer: MEDICARE

## 2024-03-28 PROCEDURE — 97140 MANUAL THERAPY 1/> REGIONS: CPT

## 2024-03-28 PROCEDURE — 97110 THERAPEUTIC EXERCISES: CPT

## 2024-03-28 NOTE — FLOWSHEET NOTE
for care and/or justification of therapy services:  The patient has functional impairments and/or activity limitations and would benefit from continued outpatient therapy services to address the deficits outlined in the patients goals    Return to Play: NA    Prognosis for POC: [x] Good [] Fair  [] Poor    Patient requires continued skilled intervention: [x] Yes  [] No      CHARGE CAPTURE     PT CHARGE GRID   CPT Code (TIMED) minutes # CPT Code (UNTIMED) #     Therex (91059)  19 2  EVAL:LOW (90426 - Typically 20 minutes face-to-face)     Neuromusc. Re-ed (18698)    Re-Eval (64060)     Manual (64047) 15 1  Estim Unattended (88870)     Ther. Act (94987)    Mech. Traction (06081)     Gait (84907)    Dry Needle 1-2 muscle (20560)     Aquatic Therex (93314)    Dry Needle 3+ muscle (20561)     Iontophoresis (69967)    VASO (34381)     Ultrasound (74489) 6   Group Therapy (44351)     Estim Attended (00550)    Canalith Repositioning (26939)     Other:    Other:    Total Timed Code Tx Minutes 40 3       Total Treatment Minutes 40        Charge Justification:  (78293) THERAPEUTIC EXERCISE - Provided verbal/tactile cueing for activities related to strengthening, flexibility, endurance, ROM performed to prevent loss of range of motion, maintain or improve muscular strength or increase flexibility, following either an injury or surgery.   (77345) MANUAL THERAPY -  Manual therapy techniques, 1 or more regions, each 15 minutes (Mobilization/manipulation, manual lymphatic drainage, manual traction) for the purpose of modulating pain, promoting relaxation,  increasing ROM, reducing/eliminating soft tissue swelling/inflammation/restriction, improving soft tissue extensibility and allowing for proper ROM for normal function with self care, mobility, lifting and ambulation      GOALS     Patient stated goal: able to exercise upper body painfree  Status: [x] Progressing: [] Met: [] Not Met: [] Adjusted    Therapist goals for Patient:

## 2024-04-02 ENCOUNTER — HOSPITAL ENCOUNTER (OUTPATIENT)
Dept: PHYSICAL THERAPY | Age: 64
Setting detail: THERAPIES SERIES
Discharge: HOME | End: 2024-04-02
Payer: MEDICARE

## 2024-04-02 PROCEDURE — 97110 THERAPEUTIC EXERCISES: CPT

## 2024-04-02 PROCEDURE — 97035 APP MDLTY 1+ULTRASOUND EA 15: CPT

## 2024-04-02 PROCEDURE — 97140 MANUAL THERAPY 1/> REGIONS: CPT

## 2024-04-02 NOTE — FLOWSHEET NOTE
[facility] - Outpatient Rehabilitation and Therapy [address] [place] [address] office: [phone] fax: [phone]    Physical Therapy: TREATMENT/PROGRESS NOTE   Patient: Arturo Green (63 y.o. male)   Examination Date: 2024   :  1960 MRN: [id number]   Visit #: 4   Insurance Allowable Auth Needed   [place] []Yes    []No    Insurance: Payor: [facility] MEDICARE / Plan: [facility] MEDICARE ADVANTAGE / Product Type: *No Product type* /   Insurance ID: [id number] - (Medicare Managed)  Secondary Insurance (if applicable):    Treatment Diagnosis:   B lateral epiconylitis   Medical Diagnosis:  Cubital tunnel syndrome on right [G56.21]   Referring Physician: Ameena Lindsey MD  PCP: Ameena Lindsey MD     Plan of care signed (Y/N): Y    Date of Patient follow up with Physician:      Progress Report/POC: NO  POC update due: (10 visits /OR AUTH LIMITS, whichever is less)  2024                                             Precautions/ Contra-indications:           Latex allergy:  NO  Pacemaker:    NO  Contraindications for Manipulation: None  Date of Surgery:     Red Flags:  None    C-SSRS Triggered by Intake questionnaire:   [x] No, Questionnaire did not trigger screening.   [] Yes, Patient intake triggered further evaluation      [] C-SSRS Screening completed  [] PCP notified via Plan of Care  [] Emergency services notified     Preferred Language for Healthcare:   [x] English       [] other:    SUBJECTIVE EXAMINATION     Patient stated complaint:  Pt reports that he has been dealing with slightly more soreness in his L elbow but is able to do everything he needs to. Wasn't feeling good over the weekend and couldn't do his exercises. Going to [place] this weekend.        Test used Initial score  3/21/24 2024   Pain Summary VAS 4/10 2-3/10   Functional questionnaire Quick DASH 29/  41%    Other:                OBJECTIVE EXAMINATION

## 2024-04-04 ENCOUNTER — APPOINTMENT (OUTPATIENT)
Dept: PHYSICAL THERAPY | Age: 64
End: 2024-04-04
Payer: MEDICARE

## 2024-04-07 DIAGNOSIS — G62.9 NEUROPATHY: ICD-10-CM

## 2024-04-08 RX ORDER — GABAPENTIN 300 MG/1
300 CAPSULE ORAL 3 TIMES DAILY
Qty: 300 CAPSULE | Refills: 2 | Status: SHIPPED | OUTPATIENT
Start: 2024-04-08 | End: 2025-04-08

## 2024-04-08 NOTE — TELEPHONE ENCOUNTER
Medication:   Requested Prescriptions     Pending Prescriptions Disp Refills    gabapentin (NEURONTIN) 300 MG capsule [Pharmacy Med Name: GABAPENTIN CAP 300MG (NEUR)] 300 capsule 2     Sig: Take 1 capsule by mouth 3 times daily.        Last Filled:  1/29/24 #300 w/o RF     Patient Phone Number: [phone] (home)     Last appt: 3/19/2024   Next appt: Visit date not found    Last OARRS:       9/29/2020    12:23 PM   RX Monitoring   Periodic Controlled Substance Monitoring Possible medication side effects, risk of tolerance/dependence & alternative treatments discussed.;No signs of potential drug abuse or diversion identified.

## 2024-04-09 ENCOUNTER — HOSPITAL ENCOUNTER (OUTPATIENT)
Dept: PHYSICAL THERAPY | Age: 64
Setting detail: THERAPIES SERIES
Discharge: HOME | End: 2024-04-09
Payer: MEDICARE

## 2024-04-09 PROCEDURE — 97110 THERAPEUTIC EXERCISES: CPT

## 2024-04-09 PROCEDURE — 97035 APP MDLTY 1+ULTRASOUND EA 15: CPT

## 2024-04-09 PROCEDURE — 97140 MANUAL THERAPY 1/> REGIONS: CPT

## 2024-04-11 ENCOUNTER — HOSPITAL ENCOUNTER (OUTPATIENT)
Dept: PHYSICAL THERAPY | Age: 64
Setting detail: THERAPIES SERIES
Discharge: HOME | End: 2024-04-11
Payer: MEDICARE

## 2024-04-11 PROCEDURE — 97110 THERAPEUTIC EXERCISES: CPT

## 2024-04-11 PROCEDURE — 97140 MANUAL THERAPY 1/> REGIONS: CPT

## 2024-04-11 PROCEDURE — 97035 APP MDLTY 1+ULTRASOUND EA 15: CPT

## 2024-04-11 NOTE — FLOWSHEET NOTE
[facility] - Outpatient Rehabilitation and Therapy [address] [place] [address] office: [phone] fax: [phone]    Physical Therapy: TREATMENT/PROGRESS NOTE   Patient: Arturo Green (63 y.o. male)   Examination Date: 2024   :  1960 MRN: [id number]   Visit #: 6   Insurance Allowable Auth Needed   MN []Yes    []No    Insurance: Payor: [facility] MEDICARE / Plan: [facility] MEDICARE ADVANTAGE / Product Type: *No Product type* /   Insurance ID: [id number] - (Medicare Managed)  Secondary Insurance (if applicable):    Treatment Diagnosis:   B lateral epiconylitis   Medical Diagnosis:  Cubital tunnel syndrome on right [G56.21]   Referring Physician: Ameena Lindsey MD  PCP: Ameena Lindsey MD     Plan of care signed (Y/N): Y    Date of Patient follow up with Physician:      Progress Report/POC: NO  POC update due: (10 visits /OR AUTH LIMITS, whichever is less)  5/10/2024                                             Precautions/ Contra-indications:           Latex allergy:  NO  Pacemaker:    NO  Contraindications for Manipulation: None  Date of Surgery:     Red Flags:  None    C-SSRS Triggered by Intake questionnaire:   [x] No, Questionnaire did not trigger screening.   [] Yes, Patient intake triggered further evaluation      [] C-SSRS Screening completed  [] PCP notified via Plan of Care  [] Emergency services notified     Preferred Language for Healthcare:   [x] English       [] other:    SUBJECTIVE EXAMINATION     Patient stated complaint:  Pt reports that he does feel like things are starting to get better, still slightly aggravated in L elbow but felt better after last time. Pain isn't constant, sometimes shooting in L elbow.       Test used Initial score  3/21/24 2024   Pain Summary VAS 4/10 0-1/10 R, 3/10 L   Functional questionnaire Quick DASH 29/  41%    Other:                OBJECTIVE EXAMINATION     ROM/Strength:    Exercises/Interventions   B lateral

## 2024-04-16 ENCOUNTER — HOSPITAL ENCOUNTER (OUTPATIENT)
Dept: PHYSICAL THERAPY | Age: 64
Setting detail: THERAPIES SERIES
Discharge: HOME | End: 2024-04-16
Payer: MEDICARE

## 2024-04-16 PROCEDURE — 97035 APP MDLTY 1+ULTRASOUND EA 15: CPT

## 2024-04-16 PROCEDURE — 97110 THERAPEUTIC EXERCISES: CPT

## 2024-04-16 PROCEDURE — 97140 MANUAL THERAPY 1/> REGIONS: CPT

## 2024-04-16 NOTE — FLOWSHEET NOTE
[facility] - Outpatient Rehabilitation and Therapy [address] [place] [address] office: [phone] fax: [phone]    Physical Therapy: TREATMENT/PROGRESS NOTE   Patient: Arturo Green (63 y.o. male)   Examination Date: 2024   :  1960 MRN: [id number]   Visit #: 7   Insurance Allowable Auth Needed   MN []Yes    []No    Insurance: Payor: [facility] MEDICARE / Plan: [facility] MEDICARE ADVANTAGE / Product Type: *No Product type* /   Insurance ID: [id number] - (Medicare Managed)  Secondary Insurance (if applicable):    Treatment Diagnosis:   B lateral epiconylitis   Medical Diagnosis:  Cubital tunnel syndrome on right [G56.21]   Referring Physician: Ameena Lindsey MD  PCP: Ameena Lindsey MD     Plan of care signed (Y/N): Y    Date of Patient follow up with Physician:      Progress Report/POC: NO  POC update due: (10 visits /OR AUTH LIMITS, whichever is less)  2024                                             Precautions/ Contra-indications:           Latex allergy:  NO  Pacemaker:    NO  Contraindications for Manipulation: None  Date of Surgery:     Red Flags:  None    C-SSRS Triggered by Intake questionnaire:   [x] No, Questionnaire did not trigger screening.   [] Yes, Patient intake triggered further evaluation      [] C-SSRS Screening completed  [] PCP notified via Plan of Care  [] Emergency services notified     Preferred Language for Healthcare:   [x] English       [] other:    SUBJECTIVE EXAMINATION     Patient stated complaint:  Pt reports that his R elbow is totally fine but L one is still bothering him. Went on a camper trip this weekend and noticed it was difficult with getting things packed because of L elbow. He has been doing his stretches at home but does get pain with pushing motion and has to hang TV in the camper today.       Test used Initial score  3/21/24 2024   Pain Summary VAS 4/10 0-1/10 R, 2/10 L   Functional questionnaire Quick DASH

## 2024-04-18 ENCOUNTER — HOSPITAL ENCOUNTER (OUTPATIENT)
Dept: PHYSICAL THERAPY | Age: 64
Setting detail: THERAPIES SERIES
Discharge: HOME | End: 2024-04-18
Payer: MEDICARE

## 2024-04-18 PROCEDURE — 97110 THERAPEUTIC EXERCISES: CPT | Performed by: PHYSICAL THERAPIST

## 2024-04-18 PROCEDURE — 97035 APP MDLTY 1+ULTRASOUND EA 15: CPT | Performed by: PHYSICAL THERAPIST

## 2024-04-18 PROCEDURE — 97161 PT EVAL LOW COMPLEX 20 MIN: CPT | Performed by: PHYSICAL THERAPIST

## 2024-04-18 PROCEDURE — 97140 MANUAL THERAPY 1/> REGIONS: CPT | Performed by: PHYSICAL THERAPIST

## 2024-04-18 NOTE — FLOWSHEET NOTE
[facility] - Outpatient Rehabilitation and Therapy [address] [place] [address] office: [phone] fax: [phone]    Physical Therapy: TREATMENT/PROGRESS NOTE   Patient: Arturo Green (63 y.o. male)   Examination Date: 2024   :  1960 MRN: [id number]   Visit #:   Insurance Allowable Auth Needed   MN []Yes    []No    Insurance: Payor: [facility] MEDICARE / Plan: [facility] MEDICARE ADVANTAGE / Product Type: *No Product type* /   Insurance ID: [id number] - (Medicare Managed)  Secondary Insurance (if applicable):    Treatment Diagnosis:   B lateral epiconylitis   Medical Diagnosis:  Cubital tunnel syndrome on right [G56.21]   Referring Physician: Ameena Lindsey MD  PCP: Ameena Lindsey MD     Plan of care signed (Y/N): Y    Date of Patient follow up with Physician:      Progress Report/POC: NO  POC update due: (10 visits /OR AUTH LIMITS, whichever is less)  2024                                             Precautions/ Contra-indications:           Latex allergy:  NO  Pacemaker:    NO  Contraindications for Manipulation: None  Date of Surgery:     Red Flags:  None    C-SSRS Triggered by Intake questionnaire:   [x] No, Questionnaire did not trigger screening.   [] Yes, Patient intake triggered further evaluation      [] C-SSRS Screening completed  [] PCP notified via Plan of Care  [] Emergency services notified     Preferred Language for Healthcare:   [x] English       [] other:    SUBJECTIVE EXAMINATION     Patient stated complaint:  Pt reports that his R elbow is totally fine but L one is still bothering him. L elbow is 70% improved, however can feel when gripping or being active with L UE.     Test used Initial score  3/21/24 2024   Pain Summary  0-1/10 R, 2/10 L   Functional questionnaire Quick DASH 29/  41%    Other:                OBJECTIVE EXAMINATION     ROM/Strength:  : Cont TTP at L Epicondyle, improved flexibility of the wrist

## 2024-04-23 ENCOUNTER — HOSPITAL ENCOUNTER (OUTPATIENT)
Dept: PHYSICAL THERAPY | Age: 64
Setting detail: THERAPIES SERIES
Discharge: HOME | End: 2024-04-23
Payer: MEDICARE

## 2024-04-23 PROCEDURE — 97140 MANUAL THERAPY 1/> REGIONS: CPT | Performed by: PHYSICAL THERAPIST

## 2024-04-23 PROCEDURE — 97035 APP MDLTY 1+ULTRASOUND EA 15: CPT | Performed by: PHYSICAL THERAPIST

## 2024-04-23 PROCEDURE — 97110 THERAPEUTIC EXERCISES: CPT | Performed by: PHYSICAL THERAPIST

## 2024-04-23 NOTE — FLOWSHEET NOTE
[facility] - Outpatient Rehabilitation and Therapy [address] [place] [address] office: [phone] fax: [phone]    Physical Therapy: TREATMENT/PROGRESS NOTE   Patient: Arturo Green (63 y.o. male)   Examination Date: 2024   :  1960 MRN: [id number]   Visit #:   Insurance Allowable Auth Needed   MN []Yes    []No    Insurance: Payor: [facility] MEDICARE / Plan: [facility] MEDICARE ADVANTAGE / Product Type: *No Product type* /   Insurance ID: [id number] - (Medicare Managed)  Secondary Insurance (if applicable):    Treatment Diagnosis:   B lateral epiconylitis   Medical Diagnosis:  Cubital tunnel syndrome on right [G56.21]   Referring Physician: Ameena Lindsey MD  PCP: Ameena Lindsey MD     Plan of care signed (Y/N): Y    Date of Patient follow up with Physician:      Progress Report/POC: NO  POC update due: (10 visits /OR AUTH LIMITS, whichever is less)  2024                                             Precautions/ Contra-indications:           Latex allergy:  NO  Pacemaker:    NO  Contraindications for Manipulation: None  Date of Surgery:     Red Flags:  None    C-SSRS Triggered by Intake questionnaire:   [x] No, Questionnaire did not trigger screening.   [] Yes, Patient intake triggered further evaluation      [] C-SSRS Screening completed  [] PCP notified via Plan of Care  [] Emergency services notified     Preferred Language for Healthcare:   [x] English       [] other:    SUBJECTIVE EXAMINATION     Patient stated complaint:  Pt reports that his R elbow is totally fine but L one is still bothering him. L elbow is 70% improved, however can feel when gripping or being active with L UE. He still has not found the brace for L wrist. Gripping and twisting motions onset pain.     Test used Initial score  3/21/24 2024   Pain Summary VAS 4/10 0/10 R, 2/10 L   Functional questionnaire Quick DASH 29/  41% 19  / 18%   Other:                OBJECTIVE

## 2024-04-25 ENCOUNTER — HOSPITAL ENCOUNTER (OUTPATIENT)
Dept: PHYSICAL THERAPY | Age: 64
Setting detail: THERAPIES SERIES
Discharge: HOME | End: 2024-04-25
Payer: MEDICARE

## 2024-04-25 PROCEDURE — 97140 MANUAL THERAPY 1/> REGIONS: CPT | Performed by: PHYSICAL THERAPIST

## 2024-04-25 PROCEDURE — 97110 THERAPEUTIC EXERCISES: CPT | Performed by: PHYSICAL THERAPIST

## 2024-04-25 PROCEDURE — 97035 APP MDLTY 1+ULTRASOUND EA 15: CPT | Performed by: PHYSICAL THERAPIST

## 2024-04-25 NOTE — FLOWSHEET NOTE
Met: [] Not Met: [] Adjusted  Patient will have a decrease in pain to 3/10 to help facilitate improvement in movement, function, and ADLs as indicated by functional deficits.   Status: [] Progressing: [x] Met: [] Not Met: [] Adjusted    Long Term Goals: To be achieved in: 3-5 weeks  Disability index score of 25% or less for the Quick DASH to assist with return top prior level of function.   Status: [] Progressing: [x] Met: [] Not Met: [] Adjusted  Improve ROM to WNL to allow for proper joint functioning as indicated by patients functional deficits.  Status: [x] Progressing: [] Met: [] Not Met: [] Adjusted  Pt to improve strength to 4+/5 or better of wrist extensors and  strength to allow for proper muscle and joint use in functional mobility, ADLs and prior level of function   Status: [x] Progressing: [] Met: [] Not Met: [] Adjusted  Patient will return to  Lifting and Tool handling without increased symptoms or restriction to work towards return to prior level of function.        Status: [x] Progressing: [] Met: [] Not Met: [] Adjusted  Patient will resume normal work/leisure activities without pain.          Status: [x] Progressing: [] Met: [] Not Met: [] Adjusted    TREATMENT PLAN     Plan: Cont POC- Continue emphasis/focus on exercise progression, modulating pain, and increasing ROM. Next visit plan to progress weights, progress reps, and add new exercises   4/18: Will cont x1 more sessions to focus on L elbow. If ant s/s remain then possible Ortho consult.    Electronically Signed by Katia Jimenez, PTMPT  [id number]  Date: 04/25/2024     Note: Portions of this note have been templated and/or copied from initial evaluation, reassessments and prior notes for documentation efficiency.

## 2024-04-29 ENCOUNTER — OFFICE VISIT (OUTPATIENT)
Dept: CARDIOLOGY CLINIC | Age: 64
End: 2024-04-29
Payer: MEDICARE

## 2024-04-29 VITALS
DIASTOLIC BLOOD PRESSURE: 80 MMHG | HEART RATE: 68 BPM | SYSTOLIC BLOOD PRESSURE: 140 MMHG | BODY MASS INDEX: 28.37 KG/M2 | WEIGHT: 215 LBS

## 2024-04-29 DIAGNOSIS — I50.22 CHRONIC SYSTOLIC CONGESTIVE HEART FAILURE (HCC): Primary | ICD-10-CM

## 2024-04-29 DIAGNOSIS — I48.0 PAROXYSMAL ATRIAL FIBRILLATION (HCC): ICD-10-CM

## 2024-04-29 DIAGNOSIS — I42.0 DILATED CARDIOMYOPATHY (HCC): ICD-10-CM

## 2024-04-29 DIAGNOSIS — E78.5 HYPERLIPIDEMIA, UNSPECIFIED HYPERLIPIDEMIA TYPE: ICD-10-CM

## 2024-04-29 PROCEDURE — 99214 OFFICE O/P EST MOD 30 MIN: CPT | Performed by: INTERNAL MEDICINE

## 2024-04-29 RX ORDER — METOPROLOL SUCCINATE 25 MG/1
25 TABLET, EXTENDED RELEASE ORAL DAILY
Qty: 90 TABLET | Refills: 3 | Status: SHIPPED | OUTPATIENT
Start: 2024-04-29

## 2024-04-29 ASSESSMENT — ENCOUNTER SYMPTOMS
SHORTNESS OF BREATH: 0
APNEA: 0
CHOKING: 0
ABDOMINAL DISTENTION: 0
COUGH: 0
CHEST TIGHTNESS: 0

## 2024-04-29 NOTE — PROGRESS NOTES
Subjective:      Patient ID: Arturo Green is a 63 y.o. male.    Follow up for abn echo/cardiomyopathy/hyperlipid/afib.  Doing well.  Exercising.  Wt stable.    No exertional sx. Remains stable.   No sob. Rhythm stable.   No syncope.  No edema.  No pnd/orthopnea.   No  palps.     Past Medical History:   Diagnosis Date    Allergic rhinitis     Anesthesia complication     INCREASED HEART RATE AFTER PORT PLACEMENT    Arthritis     Atrial fibrillation (HCC)     2 episodes:paroxysmal:prior cardiologist.    Coronary atherosclerosis 6/24/2015    Depression     under care of psychiatry    Dilated cardiomyopathy (HCC)     Hodgkin's disease (HCC)     2000 hodgkins stage 4:Dr. Gaming    Hyperlipidemia     Neuropathy     Prolonged emergence from general anesthesia     X1    S/P colonoscopy 12/27/2019    Dr. Gabbie Dupont    Sleeping drug overdose, intentional self-harm, initial encounter 1/31/2022    Tendonitis     Testicular pain, left 8/21/2012    Viral pharyngitis 11/27/2019    Vitamin D deficiency      Past Surgical History:   Procedure Laterality Date    COLONOSCOPY  2009    Nml per pt'.    ENDOSCOPY, COLON, DIAGNOSTIC      HERNIA REPAIR      2    KNEE SURGERY Right     OTHER SURGICAL HISTORY Left     thumb surgery    REPLACEMENT SHOULDER TOTAL Left 10/3/2019    LEFT TOTAL SHOULDER ARTHROPLASTY WITH INTERSCALENE BLOCK FOR PAIN CONTROL   [facility] performed by Dio Benavides MD at [facility] OR    SHOULDER ARTHROSCOPY      SHOULDER SURGERY Bilateral     rt shoulder LABRUM ROTATOR CUFF, LEFT ARTHROSCOPY    TUNNELED VENOUS PORT PLACEMENT      REMOVED     Social History     Socioeconomic History    Marital status:      Spouse name: Not on file    Number of children: Not on file    Years of education: Not on file    Highest education level: Not on file   Occupational History    Occupation: Printing company   Tobacco Use    Smoking status: Never    Smokeless tobacco: Never   Vaping Use    Vaping Use: Never used   Substance

## 2024-04-30 ENCOUNTER — HOSPITAL ENCOUNTER (OUTPATIENT)
Dept: PHYSICAL THERAPY | Age: 64
Setting detail: THERAPIES SERIES
Discharge: HOME | End: 2024-04-30
Payer: MEDICARE

## 2024-04-30 PROCEDURE — 97110 THERAPEUTIC EXERCISES: CPT | Performed by: PHYSICAL THERAPIST

## 2024-04-30 PROCEDURE — 97140 MANUAL THERAPY 1/> REGIONS: CPT | Performed by: PHYSICAL THERAPIST

## 2024-04-30 PROCEDURE — 97035 APP MDLTY 1+ULTRASOUND EA 15: CPT | Performed by: PHYSICAL THERAPIST

## 2024-04-30 NOTE — FLOWSHEET NOTE
[facility] - Outpatient Rehabilitation and Therapy [address] [place] [address] office: [phone] fax: [phone]    Physical Therapy: TREATMENT/PROGRESS NOTE   Patient: Arturo Green (63 y.o. male)   Examination Date: 2024   :  1960 MRN: [id number]   Visit #:   Insurance Allowable Auth Needed   MN []Yes    []No    Insurance: Payor: [facility] MEDICARE / Plan: [facility] MEDICARE ADVANTAGE / Product Type: *No Product type* /   Insurance ID: [id number] - (Medicare Managed)  Secondary Insurance (if applicable):    Treatment Diagnosis:   B lateral epiconylitis   Medical Diagnosis:  Cubital tunnel syndrome on right [G56.21]   Referring Physician: Ameena Lindsey MD  PCP: Ameena Lindsey MD     Plan of care signed (Y/N): Y    Date of Patient follow up with Physician:      Progress Report/POC: NO  POC update due: (10 visits /OR AUTH LIMITS, whichever is less)  2024                                             Precautions/ Contra-indications:           Latex allergy:  NO  Pacemaker:    NO  Contraindications for Manipulation: None  Date of Surgery:     Red Flags:  None    C-SSRS Triggered by Intake questionnaire:   [x] No, Questionnaire did not trigger screening.   [] Yes, Patient intake triggered further evaluation      [] C-SSRS Screening completed  [] PCP notified via Plan of Care  [] Emergency services notified     Preferred Language for Healthcare:   [x] English       [] other:    SUBJECTIVE EXAMINATION     Patient stated complaint:  Pt reports that L elbow is still bothering him. Gripping and twisting motions onset pain with normal ADLs. He noted pain with lifting bags of mulch yesterday.     Test used Initial score  3/21/24 2024   Pain Summary VAS 10 0/10 R, 2/10 L   Functional questionnaire Quick DASH 29/  41% : 19 / 18%   Other:                OBJECTIVE EXAMINATION     ROM/Strength:: L epicondyle ttp  : Cont TTP at L Epicondyle,

## 2024-05-10 ENCOUNTER — HOSPITAL ENCOUNTER (OUTPATIENT)
Age: 64
End: 2024-05-10
Attending: INTERNAL MEDICINE
Payer: MEDICARE

## 2024-05-10 VITALS
DIASTOLIC BLOOD PRESSURE: 80 MMHG | HEIGHT: 73 IN | WEIGHT: 215 LBS | BODY MASS INDEX: 28.49 KG/M2 | SYSTOLIC BLOOD PRESSURE: 140 MMHG

## 2024-05-10 DIAGNOSIS — I48.0 PAROXYSMAL ATRIAL FIBRILLATION (HCC): ICD-10-CM

## 2024-05-10 DIAGNOSIS — I50.22 CHRONIC SYSTOLIC CONGESTIVE HEART FAILURE (HCC): ICD-10-CM

## 2024-05-10 DIAGNOSIS — E78.5 HYPERLIPIDEMIA, UNSPECIFIED HYPERLIPIDEMIA TYPE: ICD-10-CM

## 2024-05-10 DIAGNOSIS — I42.0 DILATED CARDIOMYOPATHY (HCC): ICD-10-CM

## 2024-05-10 LAB
ECHO AO ASC DIAM: 4 CM
ECHO AO ASCENDING AORTA INDEX: 1.8 CM/M2
ECHO AO ROOT DIAM: 3.8 CM
ECHO AO ROOT INDEX: 1.71 CM/M2
ECHO AV AREA PEAK VELOCITY: 2.2 CM2
ECHO AV AREA VTI: 2.2 CM2
ECHO AV AREA/BSA PEAK VELOCITY: 1 CM2/M2
ECHO AV AREA/BSA VTI: 1 CM2/M2
ECHO AV MEAN GRADIENT: 4 MMHG
ECHO AV MEAN VELOCITY: 0.9 M/S
ECHO AV PEAK GRADIENT: 7 MMHG
ECHO AV PEAK VELOCITY: 1.3 M/S
ECHO AV VELOCITY RATIO: 0.54
ECHO AV VTI: 31.2 CM
ECHO BSA: 2.24 M2
ECHO IVC EXP: 1.7 CM
ECHO LA AREA 2C: 17 CM2
ECHO LA AREA 4C: 19.4 CM2
ECHO LA MAJOR AXIS: 5 CM
ECHO LA MINOR AXIS: 4.8 CM
ECHO LA VOL BP: 54 ML (ref 18–58)
ECHO LA VOL MOD A2C: 49 ML (ref 18–58)
ECHO LA VOL MOD A4C: 58 ML (ref 18–58)
ECHO LA VOL/BSA BIPLANE: 24 ML/M2 (ref 16–34)
ECHO LA VOLUME INDEX MOD A2C: 22 ML/M2 (ref 16–34)
ECHO LA VOLUME INDEX MOD A4C: 26 ML/M2 (ref 16–34)
ECHO LV E' LATERAL VELOCITY: 9 CM/S
ECHO LV E' SEPTAL VELOCITY: 8 CM/S
ECHO LV FRACTIONAL SHORTENING: 23 % (ref 28–44)
ECHO LV INTERNAL DIMENSION DIASTOLE INDEX: 2.34 CM/M2
ECHO LV INTERNAL DIMENSION DIASTOLIC: 5.2 CM (ref 4.2–5.9)
ECHO LV INTERNAL DIMENSION SYSTOLIC INDEX: 1.8 CM/M2
ECHO LV INTERNAL DIMENSION SYSTOLIC: 4 CM
ECHO LV IVSD: 1.1 CM (ref 0.6–1)
ECHO LV MASS 2D: 220.8 G (ref 88–224)
ECHO LV MASS INDEX 2D: 99.4 G/M2 (ref 49–115)
ECHO LV POSTERIOR WALL DIASTOLIC: 1.1 CM (ref 0.6–1)
ECHO LV RELATIVE WALL THICKNESS RATIO: 0.42
ECHO LVOT AREA: 3.8 CM2
ECHO LVOT AV VTI INDEX: 0.58
ECHO LVOT DIAM: 2.2 CM
ECHO LVOT MEAN GRADIENT: 1 MMHG
ECHO LVOT PEAK GRADIENT: 2 MMHG
ECHO LVOT PEAK VELOCITY: 0.7 M/S
ECHO LVOT STROKE VOLUME INDEX: 30.8 ML/M2
ECHO LVOT SV: 68.4 ML
ECHO LVOT VTI: 18 CM
ECHO MV A VELOCITY: 0.93 M/S
ECHO MV AREA VTI: 2.9 CM2
ECHO MV E DECELERATION TIME (DT): 217 MS
ECHO MV E VELOCITY: 0.64 M/S
ECHO MV E/A RATIO: 0.69
ECHO MV E/E' LATERAL: 7.11
ECHO MV E/E' RATIO (AVERAGED): 7.56
ECHO MV E/E' SEPTAL: 8
ECHO MV LVOT VTI INDEX: 1.29
ECHO MV MAX VELOCITY: 1 M/S
ECHO MV MEAN GRADIENT: 1 MMHG
ECHO MV MEAN VELOCITY: 0.5 M/S
ECHO MV PEAK GRADIENT: 4 MMHG
ECHO MV VTI: 23.2 CM
ECHO PV MAX VELOCITY: 1 M/S
ECHO PV PEAK GRADIENT: 4 MMHG
ECHO RA AREA 4C: 21.9 CM2
ECHO RA END SYSTOLIC VOLUME APICAL 4 CHAMBER INDEX BSA: 34 ML/M2
ECHO RA VOLUME: 76 ML
ECHO RV BASAL DIMENSION: 4.6 CM
ECHO RV FREE WALL PEAK S': 10 CM/S
ECHO RV MID DIMENSION: 3.2 CM
ECHO RV TAPSE: 2.2 CM (ref 1.7–?)
LEFT VENTRICULAR EJECTION FRACTION MODE: NORMAL
LV EF: 45 %

## 2024-05-10 PROCEDURE — 93306 TTE W/DOPPLER COMPLETE: CPT

## 2024-05-10 PROCEDURE — 93306 TTE W/DOPPLER COMPLETE: CPT | Performed by: INTERNAL MEDICINE

## 2024-07-26 ENCOUNTER — TELEPHONE (OUTPATIENT)
Dept: FAMILY MEDICINE CLINIC | Age: 64
End: 2024-07-26

## 2024-07-26 ENCOUNTER — OFFICE VISIT (OUTPATIENT)
Dept: FAMILY MEDICINE CLINIC | Age: 64
End: 2024-07-26

## 2024-07-26 ENCOUNTER — TELEPHONE (OUTPATIENT)
Dept: CARDIOLOGY CLINIC | Age: 64
End: 2024-07-26

## 2024-07-26 VITALS
BODY MASS INDEX: 28.1 KG/M2 | SYSTOLIC BLOOD PRESSURE: 124 MMHG | WEIGHT: 212 LBS | OXYGEN SATURATION: 98 % | DIASTOLIC BLOOD PRESSURE: 78 MMHG | HEART RATE: 72 BPM | HEIGHT: 73 IN

## 2024-07-26 DIAGNOSIS — H60.502 ACUTE OTITIS EXTERNA OF LEFT EAR, UNSPECIFIED TYPE: Primary | ICD-10-CM

## 2024-07-26 DIAGNOSIS — G62.9 NEUROPATHY: ICD-10-CM

## 2024-07-26 DIAGNOSIS — I42.0 DILATED CARDIOMYOPATHY (HCC): ICD-10-CM

## 2024-07-26 DIAGNOSIS — N52.8 OTHER MALE ERECTILE DYSFUNCTION: ICD-10-CM

## 2024-07-26 DIAGNOSIS — M77.11 RIGHT LATERAL EPICONDYLITIS: ICD-10-CM

## 2024-07-26 RX ORDER — TADALAFIL 5 MG/1
TABLET ORAL
Qty: 60 TABLET | Refills: 3 | Status: SHIPPED | OUTPATIENT
Start: 2024-07-26

## 2024-07-26 RX ORDER — PREGABALIN 50 MG/1
50 CAPSULE ORAL
Qty: 30 CAPSULE | Refills: 1 | Status: SHIPPED | OUTPATIENT
Start: 2024-07-26 | End: 2024-09-24

## 2024-07-26 RX ORDER — PREGABALIN 50 MG/1
50 CAPSULE ORAL
Qty: 3 CAPSULE | Refills: 1 | Status: SHIPPED | OUTPATIENT
Start: 2024-07-26 | End: 2024-07-26 | Stop reason: SDUPTHER

## 2024-07-26 RX ORDER — CIPROFLOXACIN/HYDROCORTISONE 0.2 %-1 %
3 SUSPENSION, DROPS(FINAL DOSAGE FORM)(ML) OTIC (EAR) 2 TIMES DAILY
Qty: 10 ML | Refills: 0 | Status: SHIPPED | OUTPATIENT
Start: 2024-07-26 | End: 2024-08-02

## 2024-07-26 NOTE — TELEPHONE ENCOUNTER
Pharmacy called requesting clarity on script....  pregabalin (LYRICA) 50 MG capsule [[id number]]    Order Details  Dose: 50 mg Route: Oral Frequency: DAILY BEFORE BREAKFAST   Dispense Quantity: 3 capsule Refills: 1          Stated that...sig does not match quantity. Sig: Take 1 capsule by mouth every morning (before breakfast) for 30 days. Max Daily Amount: 50 mg       Please advise.     Pharmacy...[address] [phone] -  [phone]

## 2024-07-26 NOTE — PATIENT INSTRUCTIONS
Metoprolol can cause ED. Talk to cardiology about coreg    Higher dose of cialis    Darryn pancahl- NP with heart failure clinic. Works with Dr. Lo whom you'll see in 3 months or so. You'll see darryn sooner than that     Lyrica in the morning for neuropathy. Keep doing gabapentin as is, at night.    PT for the elbow with [facility] . Do dry needling

## 2024-07-26 NOTE — PROGRESS NOTES
reviewed  Gen:  WD, WN, NAD, A&Ox3, pleasant  HEENT: Eyes: no conjunctivitis, EOMI, PERRLA.   Ears: pain with tragal tugging of left ear; TM clear b/l, light reflex wnl. No lesions in mouth or lips. Oropharynx slightly erythematous, no tonsilar hypertrophy or exudates  Neck:  Supple, No cervical or submandibular LAD. No obvious thyromegaly.  Heart:  RRR, no murmur, rubs, gallops  Lungs:  CTAB, no W/R/R  Abd:  soft, NT/ND  Skin: No obvious rashes    ASSESSMENT/PLAN:  1. Acute otitis externa of left ear, unspecified type  New. Treat with topical therapy. Stop using Qtips. Only clean ears with tissues on tip of pinky. Pt agrees  -     ciprofloxacin-hydrocortisone (CIPRO HC) 0.2-1 % otic suspension; Place 3 drops into the left ear 2 times daily for 7 days, Disp-10 mL, R-0Normal    2. Male erectile dysfunction  Est. Uncontrolled. Having spontaneous erections/nocturnal erections. Do not recommend testing testosterone, but open to it if he'd like to. Increase to tadalafil 10mg daily. Talk to cardiology about toprol, as that can also cause sexual dysfunction. Perhaps coreg would be better- need BB w/ hx of cardiomyopathy.  -     tadalafil (CIALIS) 5 MG tablet; T2 tab po 1 hour prior to sexual intercourse. Max 10mg per 24 hours., Disp-60 tablet, R-3Normal    3. Dilated cardiomyopathy (HCC)  Est. Recent echo w/ decreased EF. We discussed this. Will refer to HF clinic. Msg sent to Desi Nolan to get him in sooner. The HF clinic will call to him to schedule.  -     Erica Leal MD, Cad[facility] (Heart Failure), [facility]    4. Right lateral epicondylitis  Est, uncontrolled. Rec PT with [facility]erson Hernandez for dry needling. If persists, will send to Dr. Gunn for possibly steroid injection and surgical intervention discussion. Pt agrees  -     [facility]  [facility] (Ortho [facility])-[facility]    5. Neuropathy  Est. Uncontrolled. Chemotherapy induced. Taking gabapentin 900mg qhs but sx are not well controlled

## 2024-07-29 NOTE — TELEPHONE ENCOUNTER
Left message for patient to call back and schedule ov with ELZIABETH new pt referral from Dr. Lindsey for HF.

## 2024-08-06 ENCOUNTER — HOSPITAL ENCOUNTER (OUTPATIENT)
Dept: PHYSICAL THERAPY | Age: 64
Setting detail: THERAPIES SERIES
Discharge: HOME | End: 2024-08-06
Payer: MEDICARE

## 2024-08-06 DIAGNOSIS — M77.12 LATERAL EPICONDYLITIS OF BOTH ELBOWS: Primary | ICD-10-CM

## 2024-08-06 DIAGNOSIS — M77.11 LATERAL EPICONDYLITIS OF BOTH ELBOWS: Primary | ICD-10-CM

## 2024-08-06 DIAGNOSIS — M43.6 STIFFNESS OF CERVICAL SPINE: ICD-10-CM

## 2024-08-06 PROCEDURE — 97530 THERAPEUTIC ACTIVITIES: CPT

## 2024-08-06 PROCEDURE — 97161 PT EVAL LOW COMPLEX 20 MIN: CPT

## 2024-08-06 NOTE — PLAN OF CARE
[facility] - Outpatient Rehabilitation and Therapy [address], [place] [address] office: [phone] fax: [phone]     Physical Therapy Initial Evaluation Certification    Dear Ameena Lindsey MD,    We had the pleasure of evaluating the following patient for physical therapy services at [facility].  A summary of our findings can be found in the initial assessment below.  This includes our plan of care.  If you have any questions or concerns regarding these findings, please do not hesitate to contact me at the office phone number listed above.  Thank you for the referral.     Physician Signature:_______________________________Date:__________________  By signing above (or electronic signature), therapist’s plan is approved by physician       Physical Therapy: TREATMENT/PROGRESS NOTE   Patient: Arturo Green (63 y.o. male)   Examination Date: 2024   :  1960 MRN: [id number]   Visit #: 1   Insurance Allowable Auth Needed   BOMN []Yes    [x]No    Insurance: Payor: [facility] MEDICARE / Plan: [facility] MEDICARE ADVANTAGE / Product Type: *No Product type* /   Insurance ID: [id number] - (Medicare Managed)  Secondary Insurance (if applicable):    Treatment Diagnosis:     ICD-10-CM    1. Lateral epicondylitis of both elbows  M77.11     M77.12       2. Stiffness of cervical spine  M43.6          Medical Diagnosis:  Right lateral epicondylitis [M77.11]   Referring Physician: Ameena Lindsey MD  PCP: Ameena Lindsey MD     Plan of care signed (Y/N): N    Date of Patient follow up with Physician: PRN     Plan of Care Report: EVAL today  POC update due: (10 visits /OR AUTH LIMITS, whichever is less)  2024                                             Medical History:  Comorbidities:  Hypertension  Osteoarthritis  Anxiety  Depression  Other Cardio/Pulmonary Conditions: CHF  Relevant Medical History: Left TSA (2019), R RTC/labral repair

## 2024-08-06 NOTE — PROGRESS NOTES
appreciate the opportunity of cooperating in the care of this individual.    EMELY AMOR, MICHAEL - CNP, 8/6/2024,9:00 AM

## 2024-08-07 ENCOUNTER — OFFICE VISIT (OUTPATIENT)
Dept: CARDIOLOGY CLINIC | Age: 64
End: 2024-08-07
Payer: MEDICARE

## 2024-08-07 VITALS
HEIGHT: 73 IN | DIASTOLIC BLOOD PRESSURE: 80 MMHG | SYSTOLIC BLOOD PRESSURE: 132 MMHG | HEART RATE: 82 BPM | OXYGEN SATURATION: 99 % | BODY MASS INDEX: 27.96 KG/M2 | WEIGHT: 211 LBS

## 2024-08-07 DIAGNOSIS — I42.0 DILATED CARDIOMYOPATHY (HCC): Primary | ICD-10-CM

## 2024-08-07 DIAGNOSIS — I25.10 ATHEROSCLEROSIS OF NATIVE CORONARY ARTERY OF NATIVE HEART WITHOUT ANGINA PECTORIS: ICD-10-CM

## 2024-08-07 DIAGNOSIS — R07.9 CHEST PAIN, UNSPECIFIED TYPE: ICD-10-CM

## 2024-08-07 DIAGNOSIS — I50.32 DIASTOLIC CHF, CHRONIC (HCC): ICD-10-CM

## 2024-08-07 DIAGNOSIS — I48.0 PAROXYSMAL ATRIAL FIBRILLATION (HCC): ICD-10-CM

## 2024-08-07 PROCEDURE — 99214 OFFICE O/P EST MOD 30 MIN: CPT | Performed by: NURSE PRACTITIONER

## 2024-08-07 PROCEDURE — 93000 ELECTROCARDIOGRAM COMPLETE: CPT | Performed by: NURSE PRACTITIONER

## 2024-08-07 RX ORDER — SACUBITRIL AND VALSARTAN 24; 26 MG/1; MG/1
1 TABLET, FILM COATED ORAL 2 TIMES DAILY
Qty: 60 TABLET | Refills: 3 | Status: SHIPPED | OUTPATIENT
Start: 2024-08-07

## 2024-08-07 ASSESSMENT — ENCOUNTER SYMPTOMS
GASTROINTESTINAL NEGATIVE: 1
SHORTNESS OF BREATH: 1

## 2024-08-07 NOTE — PATIENT INSTRUCTIONS
Instructions:   Medications: stop losartan and start entresto one pill twice a day  Labs: 2 weeks  Referrals: schedule stress test  Follow up: Desi in 4 weeks, Dr. Lo new pt appointment in 3months    Visit Summary and Instructions:     Today we talked about:   Cardiomyopathy which means the heart muscle is weak and does not pump the blood well. This can cause fluid build up in different areas of your body including your lungs, also known as congestive heart failure (CHF).     Congestive Heart Failure Symptoms  Some symptoms of heart failure you might be able to see, while others you may feel. It is important you pay attention to your body. Monitor for all symptoms.  Call [phone] when you notice something is wrong. By calling early, you may feel better sooner and stay well at home. If your symptoms are severe, the doctor might see you in the office or tell you to go to the hospital.   Trouble Breathing  Feeling short of breath when doing everyday activities or even at rest  Waking up feeling like you need to sit up to breathe  Trouble breathing when lying flat or feeling more comfortable sitting upright to sleep  Cough or Wheezing  Dry, hacking cough that is worse when you lie down  Swelling  Swelling in your feet, ankles or legs  Jewelry, clothing or shoes fitting tighter  Swelling in abdomen  Weight Gain  Rapid weight gain of 3 pounds or more in 1-7 days  Work with your doctor to find the ideal weight range for you to compare weight gain or loss    Tired Feeling  Feeling tired doing normal activities, like walking, bathing and shopping  Nausea, Bloating or Lack of Appetite  Feeling full or sick to your stomach after eating small meals or more so than usual  Abdominal bloating  Confusion or Impaired Thinking  Memory loss, a “foggy” feeling or confusion  This might be better observed by friends or family     4. Atrial fibrillation: irregular and rapid heart rhythm that can lead to blood clots in the heart.

## 2024-08-08 ENCOUNTER — HOSPITAL ENCOUNTER (OUTPATIENT)
Dept: PHYSICAL THERAPY | Age: 64
Setting detail: THERAPIES SERIES
Discharge: HOME | End: 2024-08-08
Payer: MEDICARE

## 2024-08-08 ENCOUNTER — OFFICE VISIT (OUTPATIENT)
Dept: FAMILY MEDICINE CLINIC | Age: 64
End: 2024-08-08

## 2024-08-08 VITALS
OXYGEN SATURATION: 97 % | BODY MASS INDEX: 28.52 KG/M2 | HEART RATE: 75 BPM | RESPIRATION RATE: 16 BRPM | SYSTOLIC BLOOD PRESSURE: 138 MMHG | DIASTOLIC BLOOD PRESSURE: 78 MMHG | WEIGHT: 216.2 LBS | TEMPERATURE: 97 F

## 2024-08-08 DIAGNOSIS — J06.9 VIRAL URI: Primary | ICD-10-CM

## 2024-08-08 DIAGNOSIS — I42.0 DILATED CARDIOMYOPATHY (HCC): ICD-10-CM

## 2024-08-08 DIAGNOSIS — I50.22 CHRONIC SYSTOLIC (CONGESTIVE) HEART FAILURE (HCC): ICD-10-CM

## 2024-08-08 DIAGNOSIS — I48.0 PAROXYSMAL ATRIAL FIBRILLATION (HCC): ICD-10-CM

## 2024-08-08 DIAGNOSIS — C81.11 HODGKIN'S DISEASE, NODULAR SCLEROSIS, OF LYMPH NODES OF HEAD, FACE, AND NECK (HCC): ICD-10-CM

## 2024-08-08 LAB
Lab: NORMAL
QC PASS/FAIL: NORMAL
SARS-COV-2 RDRP RESP QL NAA+PROBE: NEGATIVE

## 2024-08-08 PROCEDURE — 20560 NDL INSJ W/O NJX 1 OR 2 MUSC: CPT

## 2024-08-08 PROCEDURE — 97140 MANUAL THERAPY 1/> REGIONS: CPT

## 2024-08-08 NOTE — PROGRESS NOTES
Neuropathy     Prolonged emergence from general anesthesia     X1    S/P colonoscopy 12/27/2019    Dr. Gabbie Dupont    Sleeping drug overdose, intentional self-harm, initial encounter 01/31/2022    Tendonitis     Testicular pain, left 08/21/2012    Viral pharyngitis 11/27/2019    Vitamin D deficiency      Current Outpatient Medications on File Prior to Visit   Medication Sig Dispense Refill    sacubitril-valsartan (ENTRESTO) 24-26 MG per tablet Take 1 tablet by mouth 2 times daily 60 tablet 3    tadalafil (CIALIS) 5 MG tablet T2 tab po 1 hour prior to sexual intercourse. Max 10mg per 24 hours. 60 tablet 3    pregabalin (LYRICA) 50 MG capsule Take 1 capsule by mouth every morning (before breakfast) for 60 days. Max Daily Amount: 50 mg 30 capsule 1    metoprolol succinate (TOPROL XL) 25 MG extended release tablet Take 1 tablet by mouth daily 90 tablet 3    gabapentin (NEURONTIN) 300 MG capsule Take 1 capsule by mouth 3 times daily. 300 capsule 2    atorvastatin (LIPITOR) 80 MG tablet Take 1 tablet by mouth daily 90 tablet 3    fluticasone (FLONASE) 50 MCG/ACT nasal spray USE 1 SPRAY IN EACH NOSTRIL DAILY 16 g 5    sertraline (ZOLOFT) 100 MG tablet Take 1 tablet by mouth daily 90 tablet 3    vitamin D (ERGOCALCIFEROL) 1.25 MG (46149 UT) CAPS capsule Take 1 capsule by mouth Once a week at 5 PM 12 capsule 3    aspirin EC 81 MG EC tablet Take 1 tablet by mouth daily 90 tablet 3     No current facility-administered medications on file prior to visit.       OBJECTIVE:  /78   Pulse 75   Temp 97 °F (36.1 °C) (Temporal)   Resp 16   Wt 98.1 kg (216 lb 3.2 oz)   SpO2 97%   BMI 28.52 kg/m²      Physical exam:  afebrile, vitals reviewed  Gen:  WD, WN, NAD, A&Ox3, pleasant  HEENT: Eyes: no conjunctivitis, EOMI, PERRLA. Ears: TM clear b/l, light reflex wnl. No lesions in mouth or lips. Oropharynx slightly erythematous, no tonsilar hypertrophy or exudates  Neck:  Supple, No cervical or submandibular LAD. No obvious

## 2024-08-08 NOTE — FLOWSHEET NOTE
[facility] [address], [place] [address] office: [phone] fax: [phone]     Physical Therapy: TREATMENT/PROGRESS NOTE   Patient: Arturo Green (64 y.o. male)   Examination Date: 2024   :  1960 MRN: [id number]   Visit #: 2   Insurance Allowable Auth Needed   BOMN []Yes    [x]No    Insurance: Payor: [facility] MEDICARE / Plan: [facility] MEDICARE ADVANTAGE / Product Type: *No Product type* /   Insurance ID: [id number] - (Medicare Managed)  Secondary Insurance (if applicable):    Treatment Diagnosis:     ICD-10-CM    1. Lateral epicondylitis of both elbows  M77.11     M77.12       2. Stiffness of cervical spine  M43.6          Medical Diagnosis:  Right lateral epicondylitis [M77.11]   Referring Physician: Ameena Lindsey MD  PCP: Ameena Lindsey MD     Plan of care signed (Y/N): N    Date of Patient follow up with Physician: PRN     Plan of Care Report: NO  POC update due: (10 visits /OR AUTH LIMITS, whichever is less)  2024                                             Medical History:  Comorbidities:  Hypertension  Osteoarthritis  Anxiety  Depression  Other Cardio/Pulmonary Conditions: CHF  Relevant Medical History: Left TSA (2019), R RTC/labral repair                                         Precautions/ Contra-indications:           Latex allergy:  NO  Pacemaker:    NO  Contraindications for Manipulation: None  Date of Surgery: NA  Other:    Red Flags:  None    Suicide Screening:   The patient did not verbalize a primary behavioral concern, suicidal ideation, suicidal intent, or demonstrate suicidal behaviors.    Preferred Language for Healthcare:   [x] English       [] other:    SUBJECTIVE EXAMINATION     Patient stated complaint: Patient reports he is excited to trial DN     Test used Initial score  2024   Pain Summary VAS 10 4/10   Functional questionnaire Quick DASH 36.4%    Other:

## 2024-08-13 ENCOUNTER — HOSPITAL ENCOUNTER (OUTPATIENT)
Age: 64
Discharge: HOME | End: 2024-08-15
Payer: MEDICARE

## 2024-08-13 VITALS
HEART RATE: 53 BPM | HEIGHT: 73 IN | SYSTOLIC BLOOD PRESSURE: 143 MMHG | DIASTOLIC BLOOD PRESSURE: 90 MMHG | WEIGHT: 211 LBS | BODY MASS INDEX: 27.96 KG/M2

## 2024-08-13 DIAGNOSIS — I25.10 ATHEROSCLEROSIS OF NATIVE CORONARY ARTERY OF NATIVE HEART WITHOUT ANGINA PECTORIS: ICD-10-CM

## 2024-08-13 DIAGNOSIS — I42.0 DILATED CARDIOMYOPATHY (HCC): ICD-10-CM

## 2024-08-13 DIAGNOSIS — I48.0 PAROXYSMAL ATRIAL FIBRILLATION (HCC): ICD-10-CM

## 2024-08-13 DIAGNOSIS — R07.9 CHEST PAIN, UNSPECIFIED TYPE: ICD-10-CM

## 2024-08-13 DIAGNOSIS — I50.32 DIASTOLIC CHF, CHRONIC (HCC): ICD-10-CM

## 2024-08-13 LAB
ECHO BSA: 2.22 M2
NUC STRESS EJECTION FRACTION: 57 %
NUC STRESS LV EDV: 163 ML (ref 67–155)
NUC STRESS LV ESV: 70 ML (ref 22–58)
NUC STRESS LV MASS: 180 G
STRESS BASELINE DIAS BP: 90 MMHG
STRESS BASELINE HR: 53 BPM
STRESS BASELINE ST DEPRESSION: 0 MM
STRESS BASELINE SYS BP: 143 MMHG
STRESS ESTIMATED WORKLOAD: 1.9 METS
STRESS EXERCISE DUR MIN: 4 MIN
STRESS EXERCISE DUR SEC: 0 SEC
STRESS O2 SAT PEAK: 96 %
STRESS O2 SAT REST: 96 %
STRESS PEAK DIAS BP: 92 MMHG
STRESS PEAK SYS BP: 150 MMHG
STRESS PERCENT HR ACHIEVED: 56 %
STRESS POST PEAK HR: 89 BPM
STRESS RATE PRESSURE PRODUCT: ABNORMAL BPM*MMHG
STRESS TARGET HR: 158 BPM
TID: 1.07

## 2024-08-13 PROCEDURE — 6360000002 HC RX W HCPCS: Performed by: NURSE PRACTITIONER

## 2024-08-13 PROCEDURE — 93017 CV STRESS TEST TRACING ONLY: CPT

## 2024-08-13 PROCEDURE — 3430000000 HC RX DIAGNOSTIC RADIOPHARMACEUTICAL: Performed by: NURSE PRACTITIONER

## 2024-08-13 PROCEDURE — 78452 HT MUSCLE IMAGE SPECT MULT: CPT | Performed by: INTERNAL MEDICINE

## 2024-08-13 PROCEDURE — A9502 TC99M TETROFOSMIN: HCPCS | Performed by: NURSE PRACTITIONER

## 2024-08-13 PROCEDURE — 78452 HT MUSCLE IMAGE SPECT MULT: CPT

## 2024-08-13 PROCEDURE — 93016 CV STRESS TEST SUPVJ ONLY: CPT | Performed by: INTERNAL MEDICINE

## 2024-08-13 PROCEDURE — 93018 CV STRESS TEST I&R ONLY: CPT | Performed by: INTERNAL MEDICINE

## 2024-08-13 RX ORDER — REGADENOSON 0.08 MG/ML
0.4 INJECTION, SOLUTION INTRAVENOUS
Status: COMPLETED | OUTPATIENT
Start: 2024-08-13 | End: 2024-08-13

## 2024-08-13 RX ADMIN — TETROFOSMIN 31.3 MILLICURIE: 1.38 INJECTION, POWDER, LYOPHILIZED, FOR SOLUTION INTRAVENOUS at 09:55

## 2024-08-13 RX ADMIN — REGADENOSON 0.4 MG: 0.08 INJECTION, SOLUTION INTRAVENOUS at 09:50

## 2024-08-13 RX ADMIN — TETROFOSMIN 10.8 MILLICURIE: 1.38 INJECTION, POWDER, LYOPHILIZED, FOR SOLUTION INTRAVENOUS at 08:47

## 2024-08-16 ENCOUNTER — TELEPHONE (OUTPATIENT)
Dept: CARDIOLOGY CLINIC | Age: 64
End: 2024-08-16

## 2024-08-16 NOTE — TELEPHONE ENCOUNTER
I spoke to Antonio and went over Desi's recommendations. He is agreeable.     I went over pre-op instructions:    Left Heart Cath Patient Pre-procedure Instructions    Do NOT eat or drink anything after midnight morning of procedure    MEDICATIONS:  Your medications have been reviewed. Please follow medication instructions below  It is okay to drink a sip of water with meds morning of procedure  It is okay to take ALL medications morning of procedure    Transportation: Bring someone to drive you home.     Scheduling: Alyson CONNER is our full time procedure . She will call you to schedule your procedure.    Allergies: Do you have an allergy to dye or contrast? No.

## 2024-08-19 ENCOUNTER — TELEPHONE (OUTPATIENT)
Dept: PHARMACY | Facility: CLINIC | Age: 64
End: 2024-08-19

## 2024-08-19 DIAGNOSIS — I42.0 DILATED CARDIOMYOPATHY (HCC): Primary | ICD-10-CM

## 2024-08-19 DIAGNOSIS — R07.2 PRECORDIAL PAIN: ICD-10-CM

## 2024-08-19 NOTE — TELEPHONE ENCOUNTER
[facility] CLINICAL PHARMACY: ADHERENCE REVIEW  Identified care gap per [employer]: fills at [facility]: Statin adherence      ASSESSMENT  STATIN ADHERENCE    Insurance Records claims through  2024  (Prior Year PDC = not reported; YTD PDC = FIRST FILL; Potential Fail Date: 2024):   ATORVASTATIN TAB 80MG last filled on 2024 for 100 day supply. Next refill due: 2024    Prescribed si tablet/capsule daily    Per Insurer Portal: last filled on 2024 for 100 day supply.     Per [facility]: last filled on 2024 for 100 day supply and READY to . will get  100 day supply ready to  since past due.    Lab Results   Component Value Date    CHOL 338 (H) 2024    TRIG 259 (H) 2024    HDL 54 2024     Lab Results   Component Value Date     (H) 2024      ALT   Date Value Ref Range Status   2022 18 10 - 40 U/L Final     AST   Date Value Ref Range Status   2022 16 15 - 37 U/L Final     The ASCVD Risk score (Demetria DK, et al., 2019) failed to calculate for the following reasons:    The valid total cholesterol range is 130 to 320 mg/dL     PLAN  Per insurer report, LIS-0 - co-pays are based on tiers and patient is subject to coverage gap.      The following are interventions that have been identified:   Patient OVERDUE refilling  ATORVASTATIN TAB 80MG and active on home medication list.   Refill/s of ATORVASTATIN TAB 80MG READY to ship from patient's [facility].    Attempting to reach patient to review.  Left message asking for return call. [employer] message sent to patient.      Last Visit: 2024  Next Visit: 2025      Laurel Diaz MA  [facility] Clinical   [place]  [phone] Option 1     For Pharmacy Admin Tracking Only    Program: [facility]  CPA in place:  No  Recommendation Provided To: Pharmacy: 1  Intervention Detail: Adherence

## 2024-08-22 NOTE — TELEPHONE ENCOUNTER
I called and spoke Antonio and he stated that he is going out of town for a couple of weeks and wanted to wait till he returns after 9/14 to schedule his procedure.     Date of Procedure: Thursday 9/19/24 @ [facility] with Dr. Astorga     Time of arrival: 7:30 am     Procedure time: 9:00 am     Antonio is agreeable to date and time. Reviewed and sent Antonio a Scloby message with is procedure instructions and he verbalized understanding. Encouraged to call with any questions or concerns.     Published on [employer], scheduled in Cupid and e-mailed to cath lab.

## 2024-09-16 ENCOUNTER — TELEPHONE (OUTPATIENT)
Dept: CARDIOLOGY CLINIC | Age: 64
End: 2024-09-16

## 2024-09-19 ENCOUNTER — HOSPITAL ENCOUNTER (OUTPATIENT)
Age: 64
Setting detail: OUTPATIENT SURGERY
Discharge: HOME | End: 2024-09-19
Attending: STUDENT IN AN ORGANIZED HEALTH CARE EDUCATION/TRAINING PROGRAM | Admitting: STUDENT IN AN ORGANIZED HEALTH CARE EDUCATION/TRAINING PROGRAM
Payer: MEDICARE

## 2024-09-19 VITALS
OXYGEN SATURATION: 100 % | HEIGHT: 73 IN | BODY MASS INDEX: 27.96 KG/M2 | WEIGHT: 211 LBS | TEMPERATURE: 98 F | RESPIRATION RATE: 16 BRPM | DIASTOLIC BLOOD PRESSURE: 70 MMHG | HEART RATE: 63 BPM | SYSTOLIC BLOOD PRESSURE: 110 MMHG

## 2024-09-19 DIAGNOSIS — I42.0 DILATED CARDIOMYOPATHY (HCC): ICD-10-CM

## 2024-09-19 DIAGNOSIS — I25.10 CORONARY ARTERY DISEASE DUE TO LIPID RICH PLAQUE: Primary | ICD-10-CM

## 2024-09-19 DIAGNOSIS — I25.83 CORONARY ARTERY DISEASE DUE TO LIPID RICH PLAQUE: Primary | ICD-10-CM

## 2024-09-19 LAB
ANION GAP SERPL CALCULATED.3IONS-SCNC: 12 MMOL/L (ref 3–16)
BUN SERPL-MCNC: 13 MG/DL (ref 7–20)
CALCIUM SERPL-MCNC: 9.7 MG/DL (ref 8.3–10.6)
CHLORIDE SERPL-SCNC: 101 MMOL/L (ref 99–110)
CO2 SERPL-SCNC: 25 MMOL/L (ref 21–32)
CREAT SERPL-MCNC: 0.7 MG/DL (ref 0.8–1.3)
DEPRECATED RDW RBC AUTO: 13 % (ref 12.4–15.4)
EKG ATRIAL RATE: 61 BPM
EKG DIAGNOSIS: NORMAL
EKG P-R INTERVAL: 164 MS
EKG Q-T INTERVAL: 428 MS
EKG QRS DURATION: 88 MS
EKG QTC CALCULATION (BAZETT): 430 MS
EKG R AXIS: 16 DEGREES
EKG T AXIS: 13 DEGREES
EKG VENTRICULAR RATE: 61 BPM
GFR SERPLBLD CREATININE-BSD FMLA CKD-EPI: >90 ML/MIN/{1.73_M2}
GLUCOSE SERPL-MCNC: 108 MG/DL (ref 70–99)
HCT VFR BLD AUTO: 40.7 % (ref 40.5–52.5)
HGB BLD-MCNC: 13.7 G/DL (ref 13.5–17.5)
MCH RBC QN AUTO: 30.4 PG (ref 26–34)
MCHC RBC AUTO-ENTMCNC: 33.7 G/DL (ref 31–36)
MCV RBC AUTO: 90.3 FL (ref 80–100)
PLATELET # BLD AUTO: 340 K/UL (ref 135–450)
PMV BLD AUTO: 7.2 FL (ref 5–10.5)
POC ACT LR: 231 SEC
POC ACT LR: 236 SEC
POTASSIUM SERPL-SCNC: 4.2 MMOL/L (ref 3.5–5.1)
RBC # BLD AUTO: 4.51 M/UL (ref 4.2–5.9)
SODIUM SERPL-SCNC: 138 MMOL/L (ref 136–145)
WBC # BLD AUTO: 7.7 K/UL (ref 4–11)

## 2024-09-19 PROCEDURE — 92978 ENDOLUMINL IVUS OCT C 1ST: CPT | Performed by: STUDENT IN AN ORGANIZED HEALTH CARE EDUCATION/TRAINING PROGRAM

## 2024-09-19 PROCEDURE — 2709999900 HC NON-CHARGEABLE SUPPLY: Performed by: STUDENT IN AN ORGANIZED HEALTH CARE EDUCATION/TRAINING PROGRAM

## 2024-09-19 PROCEDURE — 2500000003 HC RX 250 WO HCPCS: Performed by: STUDENT IN AN ORGANIZED HEALTH CARE EDUCATION/TRAINING PROGRAM

## 2024-09-19 PROCEDURE — 93799 UNLISTED CV SVC/PROCEDURE: CPT | Performed by: STUDENT IN AN ORGANIZED HEALTH CARE EDUCATION/TRAINING PROGRAM

## 2024-09-19 PROCEDURE — 80048 BASIC METABOLIC PNL TOTAL CA: CPT

## 2024-09-19 PROCEDURE — 36415 COLL VENOUS BLD VENIPUNCTURE: CPT

## 2024-09-19 PROCEDURE — C1769 GUIDE WIRE: HCPCS | Performed by: STUDENT IN AN ORGANIZED HEALTH CARE EDUCATION/TRAINING PROGRAM

## 2024-09-19 PROCEDURE — C1894 INTRO/SHEATH, NON-LASER: HCPCS | Performed by: STUDENT IN AN ORGANIZED HEALTH CARE EDUCATION/TRAINING PROGRAM

## 2024-09-19 PROCEDURE — C1874 STENT, COATED/COV W/DEL SYS: HCPCS | Performed by: STUDENT IN AN ORGANIZED HEALTH CARE EDUCATION/TRAINING PROGRAM

## 2024-09-19 PROCEDURE — C1725 CATH, TRANSLUMIN NON-LASER: HCPCS | Performed by: STUDENT IN AN ORGANIZED HEALTH CARE EDUCATION/TRAINING PROGRAM

## 2024-09-19 PROCEDURE — C1753 CATH, INTRAVAS ULTRASOUND: HCPCS | Performed by: STUDENT IN AN ORGANIZED HEALTH CARE EDUCATION/TRAINING PROGRAM

## 2024-09-19 PROCEDURE — 99152 MOD SED SAME PHYS/QHP 5/>YRS: CPT | Performed by: STUDENT IN AN ORGANIZED HEALTH CARE EDUCATION/TRAINING PROGRAM

## 2024-09-19 PROCEDURE — 6360000002 HC RX W HCPCS: Performed by: STUDENT IN AN ORGANIZED HEALTH CARE EDUCATION/TRAINING PROGRAM

## 2024-09-19 PROCEDURE — 99153 MOD SED SAME PHYS/QHP EA: CPT | Performed by: STUDENT IN AN ORGANIZED HEALTH CARE EDUCATION/TRAINING PROGRAM

## 2024-09-19 PROCEDURE — 7100000010 HC PHASE II RECOVERY - FIRST 15 MIN: Performed by: STUDENT IN AN ORGANIZED HEALTH CARE EDUCATION/TRAINING PROGRAM

## 2024-09-19 PROCEDURE — 93005 ELECTROCARDIOGRAM TRACING: CPT

## 2024-09-19 PROCEDURE — 85347 COAGULATION TIME ACTIVATED: CPT

## 2024-09-19 PROCEDURE — 6360000004 HC RX CONTRAST MEDICATION: Performed by: STUDENT IN AN ORGANIZED HEALTH CARE EDUCATION/TRAINING PROGRAM

## 2024-09-19 PROCEDURE — C1887 CATHETER, GUIDING: HCPCS | Performed by: STUDENT IN AN ORGANIZED HEALTH CARE EDUCATION/TRAINING PROGRAM

## 2024-09-19 PROCEDURE — 93458 L HRT ARTERY/VENTRICLE ANGIO: CPT | Performed by: STUDENT IN AN ORGANIZED HEALTH CARE EDUCATION/TRAINING PROGRAM

## 2024-09-19 PROCEDURE — 92928 PRQ TCAT PLMT NTRAC ST 1 LES: CPT | Performed by: STUDENT IN AN ORGANIZED HEALTH CARE EDUCATION/TRAINING PROGRAM

## 2024-09-19 PROCEDURE — 6370000000 HC RX 637 (ALT 250 FOR IP): Performed by: STUDENT IN AN ORGANIZED HEALTH CARE EDUCATION/TRAINING PROGRAM

## 2024-09-19 PROCEDURE — 85027 COMPLETE CBC AUTOMATED: CPT

## 2024-09-19 PROCEDURE — 7100000011 HC PHASE II RECOVERY - ADDTL 15 MIN: Performed by: STUDENT IN AN ORGANIZED HEALTH CARE EDUCATION/TRAINING PROGRAM

## 2024-09-19 DEVICE — STENT ONYXNG30018UX ONYX 3.00X18RX
Type: IMPLANTABLE DEVICE | Status: FUNCTIONAL
Brand: ONYX FRONTIER™

## 2024-09-19 DEVICE — STENT ONYXNG30038UX ONYX 3.00X38RX
Type: IMPLANTABLE DEVICE | Status: FUNCTIONAL
Brand: ONYX FRONTIER™

## 2024-09-19 RX ORDER — HEPARIN SODIUM 1000 [USP'U]/ML
INJECTION, SOLUTION INTRAVENOUS; SUBCUTANEOUS PRN
Status: DISCONTINUED | OUTPATIENT
Start: 2024-09-19 | End: 2024-09-19 | Stop reason: HOSPADM

## 2024-09-19 RX ORDER — LIDOCAINE HYDROCHLORIDE 10 MG/ML
INJECTION, SOLUTION INFILTRATION; PERINEURAL PRN
Status: DISCONTINUED | OUTPATIENT
Start: 2024-09-19 | End: 2024-09-19 | Stop reason: HOSPADM

## 2024-09-19 RX ORDER — PANTOPRAZOLE SODIUM 40 MG/1
40 TABLET, DELAYED RELEASE ORAL
Qty: 30 TABLET | Refills: 3 | Status: SHIPPED | OUTPATIENT
Start: 2024-09-20

## 2024-09-19 RX ORDER — SODIUM CHLORIDE 0.9 % (FLUSH) 0.9 %
5-40 SYRINGE (ML) INJECTION PRN
OUTPATIENT
Start: 2024-09-19

## 2024-09-19 RX ORDER — PANTOPRAZOLE SODIUM 40 MG/1
40 TABLET, DELAYED RELEASE ORAL
Status: DISCONTINUED | OUTPATIENT
Start: 2024-09-20 | End: 2024-09-19 | Stop reason: HOSPADM

## 2024-09-19 RX ORDER — SODIUM CHLORIDE 0.9 % (FLUSH) 0.9 %
5-40 SYRINGE (ML) INJECTION EVERY 12 HOURS SCHEDULED
OUTPATIENT
Start: 2024-09-19

## 2024-09-19 RX ORDER — MIDAZOLAM HYDROCHLORIDE 1 MG/ML
INJECTION INTRAMUSCULAR; INTRAVENOUS PRN
Status: DISCONTINUED | OUTPATIENT
Start: 2024-09-19 | End: 2024-09-19 | Stop reason: HOSPADM

## 2024-09-19 RX ORDER — ACETAMINOPHEN 325 MG/1
650 TABLET ORAL EVERY 4 HOURS PRN
OUTPATIENT
Start: 2024-09-19

## 2024-09-19 RX ORDER — CLOPIDOGREL 300 MG/1
TABLET, FILM COATED ORAL PRN
Status: DISCONTINUED | OUTPATIENT
Start: 2024-09-19 | End: 2024-09-19 | Stop reason: HOSPADM

## 2024-09-19 RX ORDER — CLOPIDOGREL BISULFATE 75 MG/1
75 TABLET ORAL DAILY
OUTPATIENT
Start: 2024-09-20

## 2024-09-19 RX ORDER — ASPIRIN 325 MG
TABLET ORAL PRN
Status: DISCONTINUED | OUTPATIENT
Start: 2024-09-19 | End: 2024-09-19 | Stop reason: HOSPADM

## 2024-09-19 RX ORDER — CLOPIDOGREL BISULFATE 75 MG/1
75 TABLET ORAL DAILY
Qty: 90 TABLET | Refills: 3 | Status: SHIPPED | OUTPATIENT
Start: 2024-09-19

## 2024-09-19 RX ORDER — IOPAMIDOL 755 MG/ML
INJECTION, SOLUTION INTRAVASCULAR PRN
Status: DISCONTINUED | OUTPATIENT
Start: 2024-09-19 | End: 2024-09-19 | Stop reason: HOSPADM

## 2024-09-19 RX ORDER — NITROGLYCERIN 20 MG/100ML
INJECTION INTRAVENOUS CONTINUOUS PRN
Status: COMPLETED | OUTPATIENT
Start: 2024-09-19 | End: 2024-09-19

## 2024-09-19 RX ORDER — SODIUM CHLORIDE 9 MG/ML
INJECTION, SOLUTION INTRAVENOUS PRN
OUTPATIENT
Start: 2024-09-19

## 2024-09-19 RX ORDER — PHENYLEPHRINE HCL IN 0.9% NACL 1 MG/10 ML
SYRINGE (ML) INTRAVENOUS PRN
Status: DISCONTINUED | OUTPATIENT
Start: 2024-09-19 | End: 2024-09-19 | Stop reason: HOSPADM

## 2024-09-19 RX ORDER — FENTANYL CITRATE 50 UG/ML
INJECTION, SOLUTION INTRAMUSCULAR; INTRAVENOUS PRN
Status: DISCONTINUED | OUTPATIENT
Start: 2024-09-19 | End: 2024-09-19 | Stop reason: HOSPADM

## 2024-09-19 RX ORDER — ASPIRIN 81 MG/1
81 TABLET, CHEWABLE ORAL DAILY
OUTPATIENT
Start: 2024-09-20

## 2024-09-20 ENCOUNTER — CLINICAL DOCUMENTATION (OUTPATIENT)
Dept: CARDIOLOGY CLINIC | Age: 64
End: 2024-09-20

## 2024-09-20 ENCOUNTER — TELEPHONE (OUTPATIENT)
Dept: CARDIOLOGY CLINIC | Age: 64
End: 2024-09-20

## 2024-09-20 LAB — ECHO BSA: 2.22 M2

## 2024-09-20 RX ORDER — CLOPIDOGREL BISULFATE 75 MG/1
75 TABLET ORAL DAILY
Qty: 30 TABLET | Refills: 3 | Status: SHIPPED | OUTPATIENT
Start: 2024-09-20

## 2024-10-15 ASSESSMENT — ENCOUNTER SYMPTOMS: GASTROINTESTINAL NEGATIVE: 1

## 2024-10-15 NOTE — PROGRESS NOTES
[facility]   Congestive Heart Failure    PrimaryCare Doctor:  Ameena Lindsey MD  Primary Cardiologist: augusto Lo pt         Chief Complaint:  CHF    History of Present Illness:  Arturo Green is a 64 y.o. male with PMH Hodgkin's Dz, remote hx AF not on AC, NICM, HFmrEF, non-obstructive CAD, and HLD who presents today for CHF f/u. He was seen early Aug to establish with HF team when his most recent echo showed a drop in his EF. We ordered at  at that time that was positive ane he had C with PCI to LAD on 9/19/24.   Today he is feeling much better, CP resolved and fatigue is improved.  He denies palpitations, SOB, orthopnea, PND, exertional chest pressure/discomfort, early saiety, edema, syncope or wt changes.      ER Visit: No  Recent Hospitalization: No    Baseline Weight: 210-215  Wt Readings from Last 3 Encounters:   10/16/24 96.2 kg (212 lb)   09/19/24 95.7 kg (211 lb)   08/13/24 95.7 kg (211 lb)        EF: 45%  Cardiac Testing: [facility] 9/19/24L   HEMODYNAMICS:  Aortic pressure was 104/58;  LVEDP 2.  There was no gradient between the left ventricle and aorta.       ANGIOGRAM/CORONARY ARTERIOGRAM:        The left main coronary artery is very short without angiographically significant stenosis, minimal fibrotic plaque by IVUS MLA > 10 mm2.     The left anterior descending artery has a proximal to mid vessel area of 80% stenosis which was positive by DFR at 0.86, IVUS performed with primarily fibrotic plaque with mild calcification.              D1 is angiographically free of disease     The left circumflex artery is a large vessel with luminal irregularities.              OM1 has minor luminal irregularities     The right coronary artery is dominant vessel with a mid vessel 30% stenosis, eccentric plaque.              RPDA is angiographically free of disease     INTERVENTION  Guide catheter: XB 3.0 guide catheter  - LAD wired with a Comet FFR wire and DFR was performed  - IVUS performed with

## 2024-10-16 ENCOUNTER — OFFICE VISIT (OUTPATIENT)
Dept: CARDIOLOGY CLINIC | Age: 64
End: 2024-10-16
Payer: MEDICARE

## 2024-10-16 VITALS
DIASTOLIC BLOOD PRESSURE: 78 MMHG | HEART RATE: 64 BPM | BODY MASS INDEX: 28.1 KG/M2 | WEIGHT: 212 LBS | HEIGHT: 73 IN | SYSTOLIC BLOOD PRESSURE: 124 MMHG

## 2024-10-16 DIAGNOSIS — I42.0 DILATED CARDIOMYOPATHY (HCC): Primary | ICD-10-CM

## 2024-10-16 DIAGNOSIS — I25.10 ATHEROSCLEROSIS OF NATIVE CORONARY ARTERY OF NATIVE HEART WITHOUT ANGINA PECTORIS: ICD-10-CM

## 2024-10-16 DIAGNOSIS — J30.1 SEASONAL ALLERGIC RHINITIS DUE TO POLLEN: ICD-10-CM

## 2024-10-16 DIAGNOSIS — I50.22 CHRONIC SYSTOLIC (CONGESTIVE) HEART FAILURE (HCC): ICD-10-CM

## 2024-10-16 PROBLEM — I50.32 DIASTOLIC CHF, CHRONIC (HCC): Status: RESOLVED | Noted: 2019-09-17 | Resolved: 2024-10-16

## 2024-10-16 PROCEDURE — 99214 OFFICE O/P EST MOD 30 MIN: CPT | Performed by: NURSE PRACTITIONER

## 2024-10-16 RX ORDER — FLUTICASONE PROPIONATE 50 MCG
SPRAY, SUSPENSION (ML) NASAL
Qty: 16 G | Refills: 5 | Status: SHIPPED | OUTPATIENT
Start: 2024-10-16

## 2024-10-16 ASSESSMENT — ENCOUNTER SYMPTOMS: RESPIRATORY NEGATIVE: 1

## 2024-10-18 NOTE — PROGRESS NOTES
[facility]      Cardiology Consult    Arturo Green  1960 October 18, 2024    Referring Physician: Ameena Lindsey MD  Reason for Referral: Left Heart Cath     CC: \"I feel good\"    HPI:  The patient is 64 y.o. male with a past medical history significant for Allergic rhinitis, Anesthesia complication, Arthritis, Atrial fibrillation (HCC), CHF (congestive heart failure) (HCC), Coronary atherosclerosis, Depression, Dilated cardiomyopathy (HCC), Hodgkin's disease (HCC), Hyperlipidemia, Neuropathy, Prolonged emergence from general anesthesia, S/P colonoscopy, Sleeping drug overdose, intentional self-harm, initial encounter, Tendonitis, Testicular pain, left, Viral pharyngitis, and Vitamin D deficiency. r  Hodgkin's Dz, remote hx AF not on AC, NICM, HFmrEF, non-obstructive CAD, and HLD who presents today to establish with HF team.   His most recent echo showed a drop in his EF   Today he presents for follow up and states that overall he is feeling well. He denies any new sounding cardiac complaints. He denies any chest pains or worsening shortness of breath. He reports medication compliance and is tolerating. He denies any abnormal bleeding or bruising. He denies exertional chest pain/pressure, dyspnea at rest, worsening FERNANDEZ, PND, orthopnea, palpitations, lightheadedness, weight changes, changes in LE edema, and syncope.     Past Medical History:   Diagnosis Date    Allergic rhinitis     Anesthesia complication     INCREASED HEART RATE AFTER PORT PLACEMENT    Arthritis     Atrial fibrillation (HCC)     2 episodes:paroxysmal:prior cardiologist.    CHF (congestive heart failure) (HCC)     Coronary atherosclerosis 06/24/2015    Depression     under care of psychiatry    Dilated cardiomyopathy (HCC)     Hodgkin's disease (HCC)     2000 hodgkins stage 4:Dr. Gaming    Hyperlipidemia     Neuropathy     Prolonged emergence from general anesthesia     X1    S/P colonoscopy 12/27/2019    Dr. Gabbie Dupont

## 2024-10-21 ENCOUNTER — TELEPHONE (OUTPATIENT)
Dept: CARDIOLOGY CLINIC | Age: 64
End: 2024-10-21

## 2024-10-21 ENCOUNTER — OFFICE VISIT (OUTPATIENT)
Dept: CARDIOLOGY CLINIC | Age: 64
End: 2024-10-21

## 2024-10-21 VITALS
HEART RATE: 67 BPM | OXYGEN SATURATION: 98 % | DIASTOLIC BLOOD PRESSURE: 84 MMHG | BODY MASS INDEX: 28.1 KG/M2 | WEIGHT: 212 LBS | SYSTOLIC BLOOD PRESSURE: 116 MMHG | HEIGHT: 73 IN

## 2024-10-21 DIAGNOSIS — I25.10 ATHEROSCLEROSIS OF NATIVE CORONARY ARTERY OF NATIVE HEART WITHOUT ANGINA PECTORIS: ICD-10-CM

## 2024-10-21 DIAGNOSIS — R09.89 OTHER SPECIFIED SYMPTOMS AND SIGNS INVOLVING THE CIRCULATORY AND RESPIRATORY SYSTEMS: ICD-10-CM

## 2024-10-21 DIAGNOSIS — I50.22 CHRONIC SYSTOLIC (CONGESTIVE) HEART FAILURE (HCC): Primary | ICD-10-CM

## 2024-10-21 NOTE — TELEPHONE ENCOUNTER
Breana called to request a new order for the Pt Referral.  Per Breana she can not use the: Seasonal allergic rhinitis due to pollen for the diagnosis, Breana  needs a Cardiac diagnosis: and to include his stent.  Please upload a new order in Epic.  Thank you

## 2024-10-30 NOTE — PROGRESS NOTES
[facility]  Advanced CHF/Pulmonary Hypertension   Cardiac Evaluation      Arturo Green  YOB: 1960    Date of Visit:  11/11/24      Chief Complaint   Patient presents with    Congestive Heart Failure          History of Present Illness:  Arturo Green is a 64 y.o. male who presents from referral from Ameena Lindsey MD for consultation and management of cardiomyopathy, s/p chemotherapy for stage 4B hodgkin's lymphoma now with worsening EF on recnet echo. Former pt of Dr. Chua.     Pt has a history of Hodgkin's disease, remote history AF not on anticoagulation, NICM, HFmrEF, non obstructive CAD, and hyperlipidemia.    On 7/26/24, pt saw Dr. Ameena Lindsey in office. She referred him to my office for decreased EF on echo. 6/22/22 EF was 55%, on 5/10/24 EF was 40-50%.  Desi Nolan NP saw him on 8/7/24 and ordered a stress test and follow up with me. Positive stress test on 8/13/24 and a LHC was scheduled with Dr. Astorga. LHC with PCI to LAD on 9/19/24.     LOV, with Desi Nolan NP, there were no changes. Previous OV on 8/7/24, she stopped Losartan and started Entresto 24-26 mg.     Pulmonary: Follows with Leonard Hannah MD   PCP: Follows with Ameena Lindsey MD      Today, he is here to establish care for cardiomyopathy and chronic systolic heart failure. 5/10/24 EF was 40-50%. Last labs from 9/19/24 at [facility]. He states he is feeling much better and walking for exercise. He has neuropathy in his feet and that bothers him a little. He helps take care of his grand kids. LDL was 232. He states he was off atorvastatin for a few days when he ran out, but he has been taking it for a month straight now. He states his goal weight is 190. No cardiac complaints this visit. Denies SOB, chest pain, palpitations. No edema noted. Normal gait, no mobility issues. He states he only gets SOB with extreme physical activity. He spent a lot of time fixing up his daughter's new house.       We discussed

## 2024-11-11 ENCOUNTER — OFFICE VISIT (OUTPATIENT)
Dept: CARDIOLOGY CLINIC | Age: 64
End: 2024-11-11

## 2024-11-11 VITALS
HEART RATE: 61 BPM | BODY MASS INDEX: 27.83 KG/M2 | DIASTOLIC BLOOD PRESSURE: 76 MMHG | OXYGEN SATURATION: 97 % | SYSTOLIC BLOOD PRESSURE: 124 MMHG | HEIGHT: 73 IN | WEIGHT: 210 LBS

## 2024-11-11 DIAGNOSIS — I48.0 PAROXYSMAL ATRIAL FIBRILLATION (HCC): ICD-10-CM

## 2024-11-11 DIAGNOSIS — I25.10 ATHEROSCLEROSIS OF NATIVE CORONARY ARTERY OF NATIVE HEART WITHOUT ANGINA PECTORIS: ICD-10-CM

## 2024-11-11 DIAGNOSIS — R06.02 SOB (SHORTNESS OF BREATH): ICD-10-CM

## 2024-11-11 DIAGNOSIS — I50.22 CHRONIC SYSTOLIC (CONGESTIVE) HEART FAILURE (HCC): Primary | ICD-10-CM

## 2024-11-11 DIAGNOSIS — E78.00 PURE HYPERCHOLESTEROLEMIA: ICD-10-CM

## 2024-11-11 RX ORDER — EZETIMIBE 10 MG/1
10 TABLET ORAL DAILY
Qty: 90 TABLET | Refills: 3 | Status: SHIPPED | OUTPATIENT
Start: 2024-11-11

## 2024-11-11 NOTE — PATIENT INSTRUCTIONS
Start taking Zetia 10 mg daily to help lower your cholesterol which has increased  Routine FASTING labs in January or February:  BNP, CMP, LIPID  Follow up with Desi Nolan NP in April

## 2024-11-18 DIAGNOSIS — G62.9 NEUROPATHY: ICD-10-CM

## 2024-11-19 RX ORDER — PREGABALIN 50 MG/1
CAPSULE ORAL
Qty: 90 CAPSULE | Refills: 0 | Status: SHIPPED | OUTPATIENT
Start: 2024-11-19 | End: 2025-02-17

## 2024-11-26 ENCOUNTER — TELEPHONE (OUTPATIENT)
Dept: PHARMACY | Facility: CLINIC | Age: 64
End: 2024-11-26

## 2024-11-26 NOTE — TELEPHONE ENCOUNTER
[facility]   Showing future appointments within next 150 days with a meds authorizing provider and meeting all other requirements    Future Appointments   Date Time Provider Department Center   12/3/2024  9:00 AM [facility] CARD PULM EVAL 2 [facility] CARDIAC [facility]   3/20/2025 10:00 AM Ameena Lindsey MD [facility] ECC [facility]   2025  1:45 PM Desi Nolan APRN - CNP FF Cardio [facility]   10/27/2025  1:15 PM Alexis Astorga MD FF Cardio [facility] // Department, toll free [phone], Option 3    For Pharmacy Admin Tracking Only    Program: [employer]-transfection  CPA in place:  No  Recommendation Provided To: Pharmacy: 1  Intervention Detail: Adherence Monitorin  Intervention Accepted By: Pharmacy: 1  Gap Closed?: No   Time Spent (min): 10

## 2024-11-27 NOTE — TELEPHONE ENCOUNTER
Received refill request for Norma BAIRES from [facility].     Last OV: 11/11/24    Next OV: 05/21/25    Last Labs: N/A    Last Filled: 09/19/24

## 2024-12-01 RX ORDER — PANTOPRAZOLE SODIUM 40 MG/1
40 TABLET, DELAYED RELEASE ORAL
Qty: 100 TABLET | Refills: 2 | Status: SHIPPED | OUTPATIENT
Start: 2024-12-01

## 2024-12-02 ENCOUNTER — TELEPHONE (OUTPATIENT)
Dept: CARDIAC REHAB | Age: 64
End: 2024-12-02

## 2024-12-02 NOTE — TELEPHONE ENCOUNTER
Called pt. To confirm cardiac rehab evaluation adry Mclaughlin. 09:00 am. Told about $15 copay on recorder. Left message.

## 2024-12-04 ENCOUNTER — HOSPITAL ENCOUNTER (OUTPATIENT)
Dept: CARDIAC REHAB | Age: 64
Setting detail: THERAPIES SERIES
Discharge: HOME | End: 2024-12-04
Payer: MEDICARE

## 2024-12-04 PROCEDURE — 93798 PHYS/QHP OP CAR RHAB W/ECG: CPT

## 2024-12-06 ENCOUNTER — HOSPITAL ENCOUNTER (OUTPATIENT)
Dept: CARDIAC REHAB | Age: 64
Setting detail: THERAPIES SERIES
Discharge: HOME | End: 2024-12-06
Payer: MEDICARE

## 2024-12-06 PROCEDURE — 93798 PHYS/QHP OP CAR RHAB W/ECG: CPT

## 2024-12-09 ENCOUNTER — HOSPITAL ENCOUNTER (OUTPATIENT)
Dept: CARDIAC REHAB | Age: 64
Setting detail: THERAPIES SERIES
Discharge: HOME | End: 2024-12-09
Payer: MEDICARE

## 2024-12-09 PROCEDURE — 93797 PHYS/QHP OP CAR RHAB WO ECG: CPT | Performed by: DIETITIAN, REGISTERED

## 2024-12-09 PROCEDURE — 93798 PHYS/QHP OP CAR RHAB W/ECG: CPT

## 2024-12-09 NOTE — CARDIO/PULMONARY
[facility] Cardiovascular Rehabilitation  Nutrition Counseling      Patient Name: Arturo Green. Date of Birth: [id number]. MRN: [id number]      ASSESSMENT  Patient is a 64 y.o. male seen for initial MNT visit for cardiac nutrition    Pertinent Medical History: PTCA stent.    \"Rate Your Plate\" initial assessment score: 24 to 40 - there are many ways you can make your eating habits healthier     \"Rate Your Plate\" initial nutrition goals: Eat more lean meats, eat more fruits and vegetables,cut down portion size    Patient's report of current knowledge on the nutrition topic: somewhat knowledgeable     Is the patient already making diet changes? Yes If so, what? Less portions.     What changes are working well? Portion size   Are there areas you think you could improve upon or any barriers you are concerned about? Cut back on eating out, choose leaner meat, reducing sodium, limiting alcohol intake    Diet Recall:  The patient's typical intake consists of:   - Breakfast: Flvd oats and 2 toast with tub spread water and diet dew, coffee occ. with sugar and creamer  - Lunch: [place] dalton sub salami turkey ham, capicola, teas sweetened/ half  - Dinner: Fried Chicken sandwich and fries  and beer.   - Snacks: Rarely.. sweets occ.pies, cookies  - Beverages: Water, unsweetend tea, beer, diet dew.     -Who is Responsible for Meal Preparation? wife    -Who is Responsible for Grocery Shopping? Wife and self    -How Many Times Per Week Do You Eat Meals Outside the Home? 4      DIAGNOSIS  Nutrition Diagnosis: Food and nutrition-related knowledge deficit related to Lack of previous MNT/currently undergoing MNT as evidenced by Food recall.      INTERVENTION  Instructed the Patient on:   [x] Importance of a heart-healthy diet  [x] Limiting saturated fats, avoiding trans fats, and opting for unsaturated fats  [x] Reducing sodium intake  [x] Adequate fiber intake  [x] Portion Sizes  [] Fluid Restriction   [x] Eating Out  []

## 2024-12-11 ENCOUNTER — HOSPITAL ENCOUNTER (OUTPATIENT)
Dept: CARDIAC REHAB | Age: 64
Setting detail: THERAPIES SERIES
Discharge: HOME | End: 2024-12-11
Payer: MEDICARE

## 2024-12-11 PROCEDURE — 93798 PHYS/QHP OP CAR RHAB W/ECG: CPT

## 2024-12-13 ENCOUNTER — HOSPITAL ENCOUNTER (OUTPATIENT)
Dept: CARDIAC REHAB | Age: 64
Setting detail: THERAPIES SERIES
Discharge: HOME | End: 2024-12-13
Payer: MEDICARE

## 2024-12-13 PROCEDURE — 93798 PHYS/QHP OP CAR RHAB W/ECG: CPT

## 2024-12-16 ENCOUNTER — HOSPITAL ENCOUNTER (OUTPATIENT)
Dept: CARDIAC REHAB | Age: 64
Setting detail: THERAPIES SERIES
End: 2024-12-16
Payer: MEDICARE

## 2024-12-16 DIAGNOSIS — E55.9 VITAMIN D DEFICIENCY: ICD-10-CM

## 2024-12-16 RX ORDER — ERGOCALCIFEROL 1.25 MG/1
CAPSULE, LIQUID FILLED ORAL
Qty: 12 CAPSULE | Refills: 0 | Status: SHIPPED | OUTPATIENT
Start: 2024-12-16

## 2024-12-16 NOTE — TELEPHONE ENCOUNTER
Medication:   Requested Prescriptions     Pending Prescriptions Disp Refills    vitamin D (ERGOCALCIFEROL) 1.25 MG (59552 UT) CAPS capsule [Pharmacy Med Name: Vitamin D (Ergocalciferol) 1.25 MG (41661 UT) Oral Capsule] 12 capsule 0     Sig: TAKE 1 CAPSULE BY MOUTH ONCE A WEEK AT  5PM     Last Filled:  # 12 with 3 refills on 1/12/24    Last appt: 8/8/2024   Next appt: 3/20/2025    Last OARRS:       9/29/2020    12:23 PM   RX Monitoring   Periodic Controlled Substance Monitoring Possible medication side effects, risk of tolerance/dependence & alternative treatments discussed.;No signs of potential drug abuse or diversion identified.

## 2024-12-17 ENCOUNTER — HOSPITAL ENCOUNTER (OUTPATIENT)
Dept: CARDIAC REHAB | Age: 64
Setting detail: THERAPIES SERIES
Discharge: HOME | End: 2024-12-17
Payer: MEDICARE

## 2024-12-17 PROCEDURE — 93798 PHYS/QHP OP CAR RHAB W/ECG: CPT

## 2024-12-18 ENCOUNTER — HOSPITAL ENCOUNTER (OUTPATIENT)
Dept: CARDIAC REHAB | Age: 64
Setting detail: THERAPIES SERIES
Discharge: HOME | End: 2024-12-18
Payer: MEDICARE

## 2024-12-18 PROCEDURE — 93798 PHYS/QHP OP CAR RHAB W/ECG: CPT

## 2024-12-20 ENCOUNTER — HOSPITAL ENCOUNTER (OUTPATIENT)
Dept: CARDIAC REHAB | Age: 64
Setting detail: THERAPIES SERIES
Discharge: HOME | End: 2024-12-20
Payer: MEDICARE

## 2024-12-20 PROCEDURE — 93798 PHYS/QHP OP CAR RHAB W/ECG: CPT

## 2024-12-23 ENCOUNTER — HOSPITAL ENCOUNTER (OUTPATIENT)
Dept: CARDIAC REHAB | Age: 64
Setting detail: THERAPIES SERIES
Discharge: HOME | End: 2024-12-23
Payer: MEDICARE

## 2024-12-23 PROCEDURE — 93798 PHYS/QHP OP CAR RHAB W/ECG: CPT

## 2024-12-27 ENCOUNTER — HOSPITAL ENCOUNTER (OUTPATIENT)
Dept: CARDIAC REHAB | Age: 64
Setting detail: THERAPIES SERIES
Discharge: HOME | End: 2024-12-27
Payer: MEDICARE

## 2024-12-27 PROCEDURE — 93798 PHYS/QHP OP CAR RHAB W/ECG: CPT

## 2025-01-03 ENCOUNTER — HOSPITAL ENCOUNTER (OUTPATIENT)
Dept: CARDIAC REHAB | Age: 65
Setting detail: THERAPIES SERIES
End: 2025-01-03
Payer: MEDICARE

## 2025-01-03 ENCOUNTER — TELEPHONE (OUTPATIENT)
Dept: CARDIAC REHAB | Age: 65
End: 2025-01-03

## 2025-01-06 ENCOUNTER — HOSPITAL ENCOUNTER (OUTPATIENT)
Dept: CARDIAC REHAB | Age: 65
Setting detail: THERAPIES SERIES
End: 2025-01-06
Payer: MEDICARE

## 2025-01-08 ENCOUNTER — HOSPITAL ENCOUNTER (OUTPATIENT)
Dept: CARDIAC REHAB | Age: 65
Setting detail: THERAPIES SERIES
Discharge: HOME | End: 2025-01-08
Payer: MEDICARE

## 2025-01-08 PROCEDURE — 93798 PHYS/QHP OP CAR RHAB W/ECG: CPT

## 2025-01-10 ENCOUNTER — HOSPITAL ENCOUNTER (OUTPATIENT)
Dept: CARDIAC REHAB | Age: 65
Setting detail: THERAPIES SERIES
End: 2025-01-10
Payer: MEDICARE

## 2025-01-12 DIAGNOSIS — E55.9 VITAMIN D DEFICIENCY: ICD-10-CM

## 2025-01-13 ENCOUNTER — HOSPITAL ENCOUNTER (OUTPATIENT)
Dept: CARDIAC REHAB | Age: 65
Setting detail: THERAPIES SERIES
Discharge: HOME | End: 2025-01-13
Payer: MEDICARE

## 2025-01-13 PROCEDURE — 93798 PHYS/QHP OP CAR RHAB W/ECG: CPT

## 2025-01-13 RX ORDER — ERGOCALCIFEROL 1.25 MG/1
CAPSULE, LIQUID FILLED ORAL
Qty: 4 CAPSULE | Refills: 3 | Status: SHIPPED | OUTPATIENT
Start: 2025-01-13

## 2025-01-13 NOTE — TELEPHONE ENCOUNTER
Medication:   Requested Prescriptions     Pending Prescriptions Disp Refills    vitamin D (ERGOCALCIFEROL) 1.25 MG (29488 UT) CAPS capsule [Pharmacy Med Name: Vitamin D (Ergocalciferol) 1.25 MG (60051 UT) Oral Capsule] 12 capsule 0     Sig: TAKE 1 CAPSULE BY MOUTH ONCE A WEEK AT  5PM       Last Filled:  12/16/2024 #12 0rf     Patient Phone Number: [phone] (home)     Last appt: 8/8/2024   Next appt: 3/20/2025    Last Labs DM: No results found for: \"VITD25\"

## 2025-01-15 ENCOUNTER — TELEPHONE (OUTPATIENT)
Dept: CARDIAC REHAB | Age: 65
End: 2025-01-15

## 2025-01-15 ENCOUNTER — HOSPITAL ENCOUNTER (OUTPATIENT)
Dept: CARDIAC REHAB | Age: 65
Setting detail: THERAPIES SERIES
End: 2025-01-15
Payer: MEDICARE

## 2025-01-16 ENCOUNTER — OFFICE VISIT (OUTPATIENT)
Dept: FAMILY MEDICINE CLINIC | Age: 65
End: 2025-01-16
Payer: MEDICARE

## 2025-01-16 VITALS
HEIGHT: 73 IN | OXYGEN SATURATION: 98 % | DIASTOLIC BLOOD PRESSURE: 76 MMHG | HEART RATE: 76 BPM | BODY MASS INDEX: 28.36 KG/M2 | SYSTOLIC BLOOD PRESSURE: 130 MMHG | WEIGHT: 214 LBS

## 2025-01-16 DIAGNOSIS — K59.00 CONSTIPATION, UNSPECIFIED CONSTIPATION TYPE: Primary | ICD-10-CM

## 2025-01-16 DIAGNOSIS — T14.91XA SUICIDE ATTEMPT (HCC): ICD-10-CM

## 2025-01-16 DIAGNOSIS — I50.22 CHRONIC SYSTOLIC (CONGESTIVE) HEART FAILURE (HCC): ICD-10-CM

## 2025-01-16 DIAGNOSIS — Z12.11 SCREENING FOR COLON CANCER: ICD-10-CM

## 2025-01-16 DIAGNOSIS — Z86.0100 HISTORY OF COLON POLYPS: ICD-10-CM

## 2025-01-16 DIAGNOSIS — C81.11 HODGKIN'S DISEASE, NODULAR SCLEROSIS, OF LYMPH NODES OF HEAD, FACE, AND NECK (HCC): ICD-10-CM

## 2025-01-16 DIAGNOSIS — I48.0 PAROXYSMAL ATRIAL FIBRILLATION (HCC): ICD-10-CM

## 2025-01-16 PROCEDURE — 99214 OFFICE O/P EST MOD 30 MIN: CPT | Performed by: FAMILY MEDICINE

## 2025-01-16 SDOH — ECONOMIC STABILITY: FOOD INSECURITY: WITHIN THE PAST 12 MONTHS, THE FOOD YOU BOUGHT JUST DIDN'T LAST AND YOU DIDN'T HAVE MONEY TO GET MORE.: NEVER TRUE

## 2025-01-16 SDOH — ECONOMIC STABILITY: FOOD INSECURITY: WITHIN THE PAST 12 MONTHS, YOU WORRIED THAT YOUR FOOD WOULD RUN OUT BEFORE YOU GOT MONEY TO BUY MORE.: NEVER TRUE

## 2025-01-16 ASSESSMENT — PATIENT HEALTH QUESTIONNAIRE - PHQ9
5. POOR APPETITE OR OVEREATING: NOT AT ALL
10. IF YOU CHECKED OFF ANY PROBLEMS, HOW DIFFICULT HAVE THESE PROBLEMS MADE IT FOR YOU TO DO YOUR WORK, TAKE CARE OF THINGS AT HOME, OR GET ALONG WITH OTHER PEOPLE: NOT DIFFICULT AT ALL
6. FEELING BAD ABOUT YOURSELF - OR THAT YOU ARE A FAILURE OR HAVE LET YOURSELF OR YOUR FAMILY DOWN: NOT AT ALL
SUM OF ALL RESPONSES TO PHQ QUESTIONS 1-9: 0
8. MOVING OR SPEAKING SO SLOWLY THAT OTHER PEOPLE COULD HAVE NOTICED. OR THE OPPOSITE, BEING SO FIGETY OR RESTLESS THAT YOU HAVE BEEN MOVING AROUND A LOT MORE THAN USUAL: NOT AT ALL
9. THOUGHTS THAT YOU WOULD BE BETTER OFF DEAD, OR OF HURTING YOURSELF: NOT AT ALL
SUM OF ALL RESPONSES TO PHQ QUESTIONS 1-9: 0
1. LITTLE INTEREST OR PLEASURE IN DOING THINGS: NOT AT ALL
3. TROUBLE FALLING OR STAYING ASLEEP: NOT AT ALL
SUM OF ALL RESPONSES TO PHQ QUESTIONS 1-9: 0
SUM OF ALL RESPONSES TO PHQ9 QUESTIONS 1 & 2: 0
4. FEELING TIRED OR HAVING LITTLE ENERGY: NOT AT ALL
7. TROUBLE CONCENTRATING ON THINGS, SUCH AS READING THE NEWSPAPER OR WATCHING TELEVISION: NOT AT ALL
2. FEELING DOWN, DEPRESSED OR HOPELESS: NOT AT ALL
SUM OF ALL RESPONSES TO PHQ QUESTIONS 1-9: 0

## 2025-01-16 NOTE — PROGRESS NOTES
Chief complaint: GI Problem (Gas pain. )      SUBJECTIVE:  HPI  Arturo Green (:  1960) is a 64 y.o. male with a past medical history of Hodgkin's lymphoma in remission, remote hx AF not on AC, NICM (from chemotherapy), CAD s/p PCI to LAD 24, and HLD who presents with a chief complaint of: for the last week, he's had gassy pain. No nausea. Uncomfortable sensation. If passes gas or burp, it relieves it. Has used gas relief, mucous relief, maalox but it hasn't helped. Usually has 2-3BM per day. Hadnt had a great BM until the other night. The discomfort came back the next day. He has not stooled for the last 3 days.   Has always had a problem with dairy so avoids those things. Is fine with beer pretzels or crackers. Just not going away.  Drip down back of throat but no sinus issues. No mucous coughing up. Constant sensation in the back of the throat.   C-scope in 2019 -moderate diverticulosis; 3mm polyp, poor prep. Due in 3 years with 2 day prep. Now overdue  Lyrica 50mg daily - this can cause constipation    Patient Active Problem List   Diagnosis    Cardiomyopathy (HCC)    Shoulder arthritis    Status post total shoulder replacement, left    Coronary atherosclerosis    Hodgkin's disease, nodular sclerosis, of lymph nodes of head, face, and neck (HCC)    Hyperlipidemia    Left bundle branch block (LBBB) on electrocardiogram    Paroxysmal atrial fibrillation (HCC)    Suicide attempt (HCC)    Abnormal thyroid function test    Abnormal stress electrocardiogram test    Neuropathy    Male erectile dysfunction    Vitamin D deficiency    Chronic right shoulder pain    Concussion wth loss of consciousness of 30 minutes or less    Seasonal allergic rhinitis due to pollen    Cubital tunnel syndrome on right    Mild episode of recurrent major depressive disorder (HCC)    Secondary hypercoagulable state (HCC)    Right lateral epicondylitis    Chronic systolic (congestive) heart failure     Past Medical History:

## 2025-01-17 ENCOUNTER — HOSPITAL ENCOUNTER (OUTPATIENT)
Dept: CARDIAC REHAB | Age: 65
Setting detail: THERAPIES SERIES
Discharge: HOME | End: 2025-01-17
Payer: MEDICARE

## 2025-01-17 PROCEDURE — 93798 PHYS/QHP OP CAR RHAB W/ECG: CPT

## 2025-01-20 ENCOUNTER — HOSPITAL ENCOUNTER (OUTPATIENT)
Dept: CARDIAC REHAB | Age: 65
Setting detail: THERAPIES SERIES
Discharge: HOME | End: 2025-01-20
Payer: MEDICARE

## 2025-01-22 ENCOUNTER — HOSPITAL ENCOUNTER (OUTPATIENT)
Dept: CARDIAC REHAB | Age: 65
Setting detail: THERAPIES SERIES
Discharge: HOME | End: 2025-01-22
Payer: MEDICARE

## 2025-01-22 PROCEDURE — 93798 PHYS/QHP OP CAR RHAB W/ECG: CPT

## 2025-01-24 ENCOUNTER — HOSPITAL ENCOUNTER (OUTPATIENT)
Dept: CARDIAC REHAB | Age: 65
Setting detail: THERAPIES SERIES
Discharge: HOME | End: 2025-01-24
Payer: MEDICARE

## 2025-01-24 PROCEDURE — 93798 PHYS/QHP OP CAR RHAB W/ECG: CPT

## 2025-01-27 ENCOUNTER — HOSPITAL ENCOUNTER (OUTPATIENT)
Dept: CARDIAC REHAB | Age: 65
Setting detail: THERAPIES SERIES
Discharge: HOME | End: 2025-01-27
Payer: MEDICARE

## 2025-01-27 PROCEDURE — 93798 PHYS/QHP OP CAR RHAB W/ECG: CPT

## 2025-01-29 ENCOUNTER — HOSPITAL ENCOUNTER (OUTPATIENT)
Dept: CARDIAC REHAB | Age: 65
Setting detail: THERAPIES SERIES
Discharge: HOME | End: 2025-01-29
Payer: MEDICARE

## 2025-01-29 PROCEDURE — 93798 PHYS/QHP OP CAR RHAB W/ECG: CPT

## 2025-01-31 ENCOUNTER — HOSPITAL ENCOUNTER (OUTPATIENT)
Dept: CARDIAC REHAB | Age: 65
Setting detail: THERAPIES SERIES
Discharge: HOME | End: 2025-01-31
Payer: MEDICARE

## 2025-01-31 PROCEDURE — 93798 PHYS/QHP OP CAR RHAB W/ECG: CPT

## 2025-02-03 ENCOUNTER — HOSPITAL ENCOUNTER (OUTPATIENT)
Dept: CARDIAC REHAB | Age: 65
Setting detail: THERAPIES SERIES
Discharge: HOME | End: 2025-02-03
Payer: MEDICARE

## 2025-02-03 DIAGNOSIS — E78.00 PURE HYPERCHOLESTEROLEMIA: ICD-10-CM

## 2025-02-03 PROCEDURE — 93798 PHYS/QHP OP CAR RHAB W/ECG: CPT

## 2025-02-03 RX ORDER — ATORVASTATIN CALCIUM 80 MG/1
80 TABLET, FILM COATED ORAL DAILY
Qty: 100 TABLET | Refills: 2 | Status: SHIPPED | OUTPATIENT
Start: 2025-02-03

## 2025-02-03 NOTE — TELEPHONE ENCOUNTER
Medication:   Requested Prescriptions     Pending Prescriptions Disp Refills    atorvastatin (LIPITOR) 80 MG tablet [Pharmacy Med Name: Atorvastatin Calcium 80 MG Oral Tablet] 100 tablet 2     Sig: TAKE 1 TABLET BY MOUTH ONCE  DAILY       Last Filled:  03/19/2024 #90 3rf    Patient Phone Number: [phone] (home)     Last appt: 1/16/2025   Next appt: 3/20/2025    Last Lipid:   Lab Results   Component Value Date/Time    CHOL 338 03/20/2024 11:22 AM    TRIG 259 03/20/2024 11:22 AM    HDL 54 03/20/2024 11:22 AM    HDL 73 02/13/2010 11:30 AM

## 2025-02-05 ENCOUNTER — HOSPITAL ENCOUNTER (OUTPATIENT)
Dept: CARDIAC REHAB | Age: 65
Setting detail: THERAPIES SERIES
End: 2025-02-05
Payer: MEDICARE

## 2025-02-06 ENCOUNTER — TELEPHONE (OUTPATIENT)
Dept: CARDIOLOGY CLINIC | Age: 65
End: 2025-02-06

## 2025-02-06 NOTE — TELEPHONE ENCOUNTER
LM to get scheduled with NPKV for an OV and EKG before his colonoscopy on 2/27. Please schedule the wk of the 10th if possible.

## 2025-02-07 ENCOUNTER — HOSPITAL ENCOUNTER (OUTPATIENT)
Dept: CARDIAC REHAB | Age: 65
Setting detail: THERAPIES SERIES
Discharge: HOME | End: 2025-02-07
Payer: MEDICARE

## 2025-02-07 PROCEDURE — 93798 PHYS/QHP OP CAR RHAB W/ECG: CPT

## 2025-02-10 ENCOUNTER — HOSPITAL ENCOUNTER (OUTPATIENT)
Dept: CARDIAC REHAB | Age: 65
Setting detail: THERAPIES SERIES
Discharge: HOME | End: 2025-02-10
Payer: MEDICARE

## 2025-02-10 PROCEDURE — 93798 PHYS/QHP OP CAR RHAB W/ECG: CPT

## 2025-02-11 DIAGNOSIS — I48.0 PAROXYSMAL ATRIAL FIBRILLATION (HCC): ICD-10-CM

## 2025-02-11 DIAGNOSIS — R07.9 CHEST PAIN, UNSPECIFIED TYPE: ICD-10-CM

## 2025-02-11 DIAGNOSIS — I50.32 DIASTOLIC CHF, CHRONIC (HCC): ICD-10-CM

## 2025-02-11 DIAGNOSIS — I25.10 ATHEROSCLEROSIS OF NATIVE CORONARY ARTERY OF NATIVE HEART WITHOUT ANGINA PECTORIS: ICD-10-CM

## 2025-02-11 DIAGNOSIS — I42.0 DILATED CARDIOMYOPATHY (HCC): ICD-10-CM

## 2025-02-11 LAB
ANION GAP SERPL CALCULATED.3IONS-SCNC: 11 MMOL/L (ref 3–16)
BUN SERPL-MCNC: 13 MG/DL (ref 7–20)
CALCIUM SERPL-MCNC: 10.1 MG/DL (ref 8.3–10.6)
CHLORIDE SERPL-SCNC: 103 MMOL/L (ref 99–110)
CO2 SERPL-SCNC: 26 MMOL/L (ref 21–32)
CREAT SERPL-MCNC: 0.8 MG/DL (ref 0.8–1.3)
GFR SERPLBLD CREATININE-BSD FMLA CKD-EPI: >90 ML/MIN/{1.73_M2}
GLUCOSE SERPL-MCNC: 88 MG/DL (ref 70–99)
NT-PROBNP SERPL-MCNC: 81 PG/ML (ref 0–124)
POTASSIUM SERPL-SCNC: 4.3 MMOL/L (ref 3.5–5.1)
SODIUM SERPL-SCNC: 140 MMOL/L (ref 136–145)

## 2025-02-11 ASSESSMENT — ENCOUNTER SYMPTOMS
GASTROINTESTINAL NEGATIVE: 1
RESPIRATORY NEGATIVE: 1

## 2025-02-11 NOTE — PROGRESS NOTES
[facility]   Congestive Heart Failure    PrimaryCare Doctor:  Ameena Lindsey MD  Primary Cardiologist: augusto oL pt         Chief Complaint:  CHF    History of Present Illness:  Arturo Green is a 64 y.o. male with PMH Hodgkin's Dz, remote hx AF not on AC, NICM, HFmrEF, non-obstructive CAD, and HLD who presents today for CHF f/u. He was seen early Aug to establish with HF team when his most recent echo showed a drop in his EF. We ordered a ST at that time that was positive and he had C with PCI to LAD on 9/19/24.   CM OV Nov: ABIs ordered  ELIZABETH OV Nov: zetia  Today he is out of entresto - too expensive, but is feeling well.  He denies CP, palpitations, SOB, orthopnea, PND, exertional chest pressure/discomfort, early saiety, edema, syncope or wt changes.      Home wt: 209-211    Baseline Weight: 210-215  Wt Readings from Last 3 Encounters:   02/13/25 96.6 kg (213 lb)   01/16/25 97.1 kg (214 lb)   12/03/24 97.2 kg (214 lb 3.2 oz)        EF: 45%  Cardiac Testing: [facility] 9/19/24L   HEMODYNAMICS:  Aortic pressure was 104/58;  LVEDP 2.  There was no gradient between the left ventricle and aorta.       ANGIOGRAM/CORONARY ARTERIOGRAM:        The left main coronary artery is very short without angiographically significant stenosis, minimal fibrotic plaque by IVUS MLA > 10 mm2.     The left anterior descending artery has a proximal to mid vessel area of 80% stenosis which was positive by DFR at 0.86, IVUS performed with primarily fibrotic plaque with mild calcification.              D1 is angiographically free of disease     The left circumflex artery is a large vessel with luminal irregularities.              OM1 has minor luminal irregularities     The right coronary artery is dominant vessel with a mid vessel 30% stenosis, eccentric plaque.              RPDA is angiographically free of disease     INTERVENTION  Guide catheter: XB 3.0 guide catheter  - LAD wired with a Comet FFR wire and DFR was performed  -

## 2025-02-12 ENCOUNTER — HOSPITAL ENCOUNTER (OUTPATIENT)
Dept: CARDIAC REHAB | Age: 65
Setting detail: THERAPIES SERIES
Discharge: HOME | End: 2025-02-12
Payer: MEDICARE

## 2025-02-12 PROCEDURE — 93798 PHYS/QHP OP CAR RHAB W/ECG: CPT

## 2025-02-13 ENCOUNTER — OFFICE VISIT (OUTPATIENT)
Dept: CARDIOLOGY CLINIC | Age: 65
End: 2025-02-13
Payer: MEDICARE

## 2025-02-13 VITALS
DIASTOLIC BLOOD PRESSURE: 66 MMHG | WEIGHT: 213 LBS | BODY MASS INDEX: 28.23 KG/M2 | SYSTOLIC BLOOD PRESSURE: 128 MMHG | HEART RATE: 65 BPM | HEIGHT: 73 IN | OXYGEN SATURATION: 98 %

## 2025-02-13 DIAGNOSIS — I25.5 ISCHEMIC CARDIOMYOPATHY: ICD-10-CM

## 2025-02-13 DIAGNOSIS — I25.10 ATHEROSCLEROSIS OF NATIVE CORONARY ARTERY OF NATIVE HEART WITHOUT ANGINA PECTORIS: ICD-10-CM

## 2025-02-13 DIAGNOSIS — I50.22 CHRONIC SYSTOLIC CONGESTIVE HEART FAILURE (HCC): Primary | ICD-10-CM

## 2025-02-13 PROBLEM — S06.0X1A CONCUSSION WTH LOSS OF CONSCIOUSNESS OF 30 MINUTES OR LESS: Status: RESOLVED | Noted: 2023-04-19 | Resolved: 2025-02-13

## 2025-02-13 PROCEDURE — 93000 ELECTROCARDIOGRAM COMPLETE: CPT | Performed by: NURSE PRACTITIONER

## 2025-02-13 PROCEDURE — 99214 OFFICE O/P EST MOD 30 MIN: CPT | Performed by: NURSE PRACTITIONER

## 2025-02-13 RX ORDER — VALSARTAN 80 MG/1
80 TABLET ORAL DAILY
Qty: 90 TABLET | Refills: 1 | Status: SHIPPED | OUTPATIENT
Start: 2025-02-13

## 2025-02-13 NOTE — PATIENT INSTRUCTIONS
Instructions:   Medications:  start valsartan once a day, continue other meds  Follow up: echo and f/u in 6months

## 2025-02-14 ENCOUNTER — HOSPITAL ENCOUNTER (OUTPATIENT)
Dept: CARDIAC REHAB | Age: 65
Setting detail: THERAPIES SERIES
Discharge: HOME | End: 2025-02-14
Payer: MEDICARE

## 2025-02-14 PROCEDURE — 93798 PHYS/QHP OP CAR RHAB W/ECG: CPT

## 2025-02-17 ENCOUNTER — HOSPITAL ENCOUNTER (OUTPATIENT)
Dept: CARDIAC REHAB | Age: 65
Setting detail: THERAPIES SERIES
Discharge: HOME | End: 2025-02-17
Payer: MEDICARE

## 2025-02-17 PROCEDURE — 93798 PHYS/QHP OP CAR RHAB W/ECG: CPT

## 2025-02-17 RX ORDER — METOPROLOL SUCCINATE 25 MG/1
25 TABLET, EXTENDED RELEASE ORAL DAILY
Qty: 90 TABLET | Refills: 3 | Status: SHIPPED | OUTPATIENT
Start: 2025-02-17

## 2025-02-17 NOTE — TELEPHONE ENCOUNTER
Received refill request for Metoprolol 25 MG from [facility] [id number] pharmacy.     Last OV: 10/21/24    Next OV: 10/27/25    Last EKG 02/13/25    Last Filled: 04/29/24

## 2025-02-19 ENCOUNTER — HOSPITAL ENCOUNTER (OUTPATIENT)
Dept: CARDIAC REHAB | Age: 65
Setting detail: THERAPIES SERIES
Discharge: HOME | End: 2025-02-19
Payer: MEDICARE

## 2025-02-19 PROCEDURE — 93798 PHYS/QHP OP CAR RHAB W/ECG: CPT

## 2025-02-21 ENCOUNTER — HOSPITAL ENCOUNTER (OUTPATIENT)
Dept: CARDIAC REHAB | Age: 65
Setting detail: THERAPIES SERIES
Discharge: HOME | End: 2025-02-21
Payer: MEDICARE

## 2025-02-21 ENCOUNTER — TELEPHONE (OUTPATIENT)
Dept: CARDIOLOGY CLINIC | Age: 65
End: 2025-02-21

## 2025-02-21 DIAGNOSIS — G62.9 NEUROPATHY: ICD-10-CM

## 2025-02-21 PROCEDURE — 93798 PHYS/QHP OP CAR RHAB W/ECG: CPT

## 2025-02-21 RX ORDER — PREGABALIN 50 MG/1
CAPSULE ORAL
Qty: 90 CAPSULE | Refills: 0 | Status: SHIPPED | OUTPATIENT
Start: 2025-02-21 | End: 2025-05-22

## 2025-02-21 NOTE — TELEPHONE ENCOUNTER
Called and spoke to patient about Colonoscopy.  Patient having colonoscopy for complaints of constipation.      Per Dr. Astorga stated that he prefers wait  6 months post stent before having a procedure and have to hold Plavix.      Patient called and made aware.  He verbalized understanding.      Post 6 months patient has cardiac clearance for Colonoscopy.

## 2025-02-21 NOTE — TELEPHONE ENCOUNTER
Medication:   Requested Prescriptions     Pending Prescriptions Disp Refills    pregabalin (LYRICA) 50 MG capsule [Pharmacy Med Name: Pregabalin 50 MG Oral Capsule] 90 capsule 0     Sig: TAKE 1 CAPSULE BY MOUTH IN THE MORNING BEFORE BREAKFAST        Last Filled:  11/19/2024 #90 0rf     Patient Phone Number: [phone] (home)     Last appt: 1/16/2025   Next appt: 3/20/2025    Last OARRS:       9/29/2020    12:23 PM   RX Monitoring   Periodic Controlled Substance Monitoring Possible medication side effects, risk of tolerance/dependence & alternative treatments discussed.;No signs of potential drug abuse or diversion identified.

## 2025-02-21 NOTE — TELEPHONE ENCOUNTER
CARDIAC CLEARANCE     What type of procedure are you having?  Colonoscopy    Which physician is performing your procedure?  Dr. John Golden     When is your procedure scheduled for?   02/27    Where are you having this procedure?  [facility]    Are you taking Blood Thinners?  Yes   If so what? (Name/dose/frequesncy)  Plavix 75mg     Does the surgeon want you to stop your blood thinner?  If so for how long?  Yes - 5 days prior     Phone Number and Contact Name for Physicians office:  [place] - [phone]    Fax number to send information:  [phone]

## 2025-02-24 ENCOUNTER — HOSPITAL ENCOUNTER (OUTPATIENT)
Dept: CARDIAC REHAB | Age: 65
Setting detail: THERAPIES SERIES
Discharge: HOME | End: 2025-02-24
Payer: MEDICARE

## 2025-02-24 PROCEDURE — 93798 PHYS/QHP OP CAR RHAB W/ECG: CPT

## 2025-02-24 RX ORDER — METOPROLOL SUCCINATE 25 MG/1
25 TABLET, EXTENDED RELEASE ORAL DAILY
Qty: 90 TABLET | Refills: 3 | OUTPATIENT
Start: 2025-02-24

## 2025-02-24 NOTE — TELEPHONE ENCOUNTER
Requested Prescriptions     Refused Prescriptions Disp Refills    metoprolol succinate (TOPROL XL) 25 MG extended release tablet 90 tablet 3     Sig: Take 1 tablet by mouth daily     Refused By: DEBBI SALAMANCA     Reason for Refusal: Duplicate Request      Duplicate

## 2025-02-26 ENCOUNTER — HOSPITAL ENCOUNTER (OUTPATIENT)
Dept: CARDIAC REHAB | Age: 65
Setting detail: THERAPIES SERIES
Discharge: HOME | End: 2025-02-26
Payer: MEDICARE

## 2025-02-26 PROCEDURE — 93798 PHYS/QHP OP CAR RHAB W/ECG: CPT

## 2025-02-28 ENCOUNTER — HOSPITAL ENCOUNTER (OUTPATIENT)
Dept: CARDIAC REHAB | Age: 65
Setting detail: THERAPIES SERIES
Discharge: HOME | End: 2025-02-28
Payer: MEDICARE

## 2025-02-28 PROCEDURE — 93798 PHYS/QHP OP CAR RHAB W/ECG: CPT

## 2025-03-03 ENCOUNTER — HOSPITAL ENCOUNTER (OUTPATIENT)
Dept: CARDIAC REHAB | Age: 65
Setting detail: THERAPIES SERIES
Discharge: HOME | End: 2025-03-03
Payer: MEDICARE

## 2025-03-03 PROCEDURE — 93798 PHYS/QHP OP CAR RHAB W/ECG: CPT

## 2025-03-04 DIAGNOSIS — F33.0 MILD EPISODE OF RECURRENT MAJOR DEPRESSIVE DISORDER: ICD-10-CM

## 2025-03-05 ENCOUNTER — HOSPITAL ENCOUNTER (OUTPATIENT)
Dept: CARDIAC REHAB | Age: 65
Setting detail: THERAPIES SERIES
Discharge: HOME | End: 2025-03-05
Payer: MEDICARE

## 2025-03-05 PROCEDURE — 93798 PHYS/QHP OP CAR RHAB W/ECG: CPT

## 2025-03-06 RX ORDER — SERTRALINE HYDROCHLORIDE 100 MG/1
100 TABLET, FILM COATED ORAL DAILY
Qty: 90 TABLET | Refills: 3 | Status: SHIPPED | OUTPATIENT
Start: 2025-03-06

## 2025-03-06 NOTE — TELEPHONE ENCOUNTER
Medication:   Requested Prescriptions     Pending Prescriptions Disp Refills    sertraline (ZOLOFT) 100 MG tablet [Pharmacy Med Name: Sertraline HCl 100 MG Oral Tablet] 90 tablet 3     Sig: TAKE 1 TABLET BY MOUTH DAILY        Last Filled:  03/19/2024 #90 3rf    Patient Phone Number: [phone] (home)     Last appt: 1/16/2025   Next appt: 3/20/2025    Last OARRS:       9/29/2020    12:23 PM   RX Monitoring   Periodic Controlled Substance Monitoring Possible medication side effects, risk of tolerance/dependence & alternative treatments discussed.;No signs of potential drug abuse or diversion identified.

## 2025-03-07 ENCOUNTER — HOSPITAL ENCOUNTER (OUTPATIENT)
Dept: CARDIAC REHAB | Age: 65
Setting detail: THERAPIES SERIES
Discharge: HOME | End: 2025-03-07
Payer: MEDICARE

## 2025-03-07 PROCEDURE — 93798 PHYS/QHP OP CAR RHAB W/ECG: CPT

## 2025-03-10 ENCOUNTER — HOSPITAL ENCOUNTER (OUTPATIENT)
Dept: CARDIAC REHAB | Age: 65
Setting detail: THERAPIES SERIES
Discharge: HOME | End: 2025-03-10
Payer: MEDICARE

## 2025-03-10 PROCEDURE — 93798 PHYS/QHP OP CAR RHAB W/ECG: CPT

## 2025-03-12 ENCOUNTER — HOSPITAL ENCOUNTER (OUTPATIENT)
Dept: CARDIAC REHAB | Age: 65
Setting detail: THERAPIES SERIES
Discharge: HOME | End: 2025-03-12
Payer: MEDICARE

## 2025-03-12 PROCEDURE — 93798 PHYS/QHP OP CAR RHAB W/ECG: CPT

## 2025-03-13 SDOH — HEALTH STABILITY: PHYSICAL HEALTH: ON AVERAGE, HOW MANY DAYS PER WEEK DO YOU ENGAGE IN MODERATE TO STRENUOUS EXERCISE (LIKE A BRISK WALK)?: 3 DAYS

## 2025-03-13 SDOH — HEALTH STABILITY: PHYSICAL HEALTH: ON AVERAGE, HOW MANY MINUTES DO YOU ENGAGE IN EXERCISE AT THIS LEVEL?: 60 MIN

## 2025-03-13 ASSESSMENT — LIFESTYLE VARIABLES
HOW OFTEN DURING THE LAST YEAR HAVE YOU BEEN UNABLE TO REMEMBER WHAT HAPPENED THE NIGHT BEFORE BECAUSE YOU HAD BEEN DRINKING: NEVER
HOW OFTEN DURING THE LAST YEAR HAVE YOU FAILED TO DO WHAT WAS NORMALLY EXPECTED FROM YOU BECAUSE OF DRINKING: NEVER
HOW OFTEN DURING THE LAST YEAR HAVE YOU BEEN UNABLE TO REMEMBER WHAT HAPPENED THE NIGHT BEFORE BECAUSE YOU HAD BEEN DRINKING: NEVER
HAVE YOU OR SOMEONE ELSE BEEN INJURED AS A RESULT OF YOUR DRINKING: NO
HOW OFTEN DURING THE LAST YEAR HAVE YOU HAD A FEELING OF GUILT OR REMORSE AFTER DRINKING: NEVER
HOW OFTEN DURING THE LAST YEAR HAVE YOU FAILED TO DO WHAT WAS NORMALLY EXPECTED FROM YOU BECAUSE OF DRINKING: NEVER
HOW MANY STANDARD DRINKS CONTAINING ALCOHOL DO YOU HAVE ON A TYPICAL DAY: 2
HAVE YOU OR SOMEONE ELSE BEEN INJURED AS A RESULT OF YOUR DRINKING: NO
HOW OFTEN DURING THE LAST YEAR HAVE YOU NEEDED AN ALCOHOLIC DRINK FIRST THING IN THE MORNING TO GET YOURSELF GOING AFTER A NIGHT OF HEAVY DRINKING: NEVER
HAS A RELATIVE, FRIEND, DOCTOR, OR ANOTHER HEALTH PROFESSIONAL EXPRESSED CONCERN ABOUT YOUR DRINKING OR SUGGESTED YOU CUT DOWN: NO
HOW OFTEN DURING THE LAST YEAR HAVE YOU FOUND THAT YOU WERE NOT ABLE TO STOP DRINKING ONCE YOU HAD STARTED: NEVER
HOW OFTEN DURING THE LAST YEAR HAVE YOU HAD A FEELING OF GUILT OR REMORSE AFTER DRINKING: NEVER
HOW OFTEN DURING THE LAST YEAR HAVE YOU NEEDED AN ALCOHOLIC DRINK FIRST THING IN THE MORNING TO GET YOURSELF GOING AFTER A NIGHT OF HEAVY DRINKING: NEVER
HAS A RELATIVE, FRIEND, DOCTOR, OR ANOTHER HEALTH PROFESSIONAL EXPRESSED CONCERN ABOUT YOUR DRINKING OR SUGGESTED YOU CUT DOWN: NO
HOW OFTEN DURING THE LAST YEAR HAVE YOU FOUND THAT YOU WERE NOT ABLE TO STOP DRINKING ONCE YOU HAD STARTED: NEVER
HOW OFTEN DO YOU HAVE A DRINK CONTAINING ALCOHOL: 4
HOW OFTEN DO YOU HAVE A DRINK CONTAINING ALCOHOL: 2-3 TIMES A WEEK
HOW MANY STANDARD DRINKS CONTAINING ALCOHOL DO YOU HAVE ON A TYPICAL DAY: 3 OR 4
HOW OFTEN DO YOU HAVE SIX OR MORE DRINKS ON ONE OCCASION: 1

## 2025-03-13 ASSESSMENT — PATIENT HEALTH QUESTIONNAIRE - PHQ9
SUM OF ALL RESPONSES TO PHQ QUESTIONS 1-9: 0
2. FEELING DOWN, DEPRESSED OR HOPELESS: NOT AT ALL
SUM OF ALL RESPONSES TO PHQ QUESTIONS 1-9: 0
SUM OF ALL RESPONSES TO PHQ QUESTIONS 1-9: 0
1. LITTLE INTEREST OR PLEASURE IN DOING THINGS: NOT AT ALL
SUM OF ALL RESPONSES TO PHQ QUESTIONS 1-9: 0

## 2025-03-20 ENCOUNTER — OFFICE VISIT (OUTPATIENT)
Dept: FAMILY MEDICINE CLINIC | Age: 65
End: 2025-03-20

## 2025-03-20 VITALS
WEIGHT: 216 LBS | OXYGEN SATURATION: 98 % | HEIGHT: 73 IN | BODY MASS INDEX: 28.63 KG/M2 | DIASTOLIC BLOOD PRESSURE: 68 MMHG | HEART RATE: 82 BPM | SYSTOLIC BLOOD PRESSURE: 126 MMHG

## 2025-03-20 DIAGNOSIS — I42.0 DILATED CARDIOMYOPATHY (HCC): ICD-10-CM

## 2025-03-20 DIAGNOSIS — G62.9 NEUROPATHY: ICD-10-CM

## 2025-03-20 DIAGNOSIS — Z00.00 MEDICARE ANNUAL WELLNESS VISIT, SUBSEQUENT: Primary | ICD-10-CM

## 2025-03-20 DIAGNOSIS — C81.11 HODGKIN'S DISEASE, NODULAR SCLEROSIS, OF LYMPH NODES OF HEAD, FACE, AND NECK (HCC): ICD-10-CM

## 2025-03-20 DIAGNOSIS — T14.91XA SUICIDE ATTEMPT (HCC): ICD-10-CM

## 2025-03-20 DIAGNOSIS — I48.0 PAROXYSMAL ATRIAL FIBRILLATION (HCC): ICD-10-CM

## 2025-03-20 DIAGNOSIS — I50.22 CHRONIC SYSTOLIC (CONGESTIVE) HEART FAILURE: ICD-10-CM

## 2025-03-20 PROBLEM — M19.019 SHOULDER ARTHRITIS: Status: RESOLVED | Noted: 2019-10-03 | Resolved: 2025-03-20

## 2025-03-20 ASSESSMENT — PATIENT HEALTH QUESTIONNAIRE - PHQ9
SUM OF ALL RESPONSES TO PHQ QUESTIONS 1-9: 1
9. THOUGHTS THAT YOU WOULD BE BETTER OFF DEAD, OR OF HURTING YOURSELF: NOT AT ALL
8. MOVING OR SPEAKING SO SLOWLY THAT OTHER PEOPLE COULD HAVE NOTICED. OR THE OPPOSITE, BEING SO FIGETY OR RESTLESS THAT YOU HAVE BEEN MOVING AROUND A LOT MORE THAN USUAL: NOT AT ALL
SUM OF ALL RESPONSES TO PHQ QUESTIONS 1-9: 1
7. TROUBLE CONCENTRATING ON THINGS, SUCH AS READING THE NEWSPAPER OR WATCHING TELEVISION: NOT AT ALL
1. LITTLE INTEREST OR PLEASURE IN DOING THINGS: NOT AT ALL
2. FEELING DOWN, DEPRESSED OR HOPELESS: NOT AT ALL
6. FEELING BAD ABOUT YOURSELF - OR THAT YOU ARE A FAILURE OR HAVE LET YOURSELF OR YOUR FAMILY DOWN: NOT AT ALL
5. POOR APPETITE OR OVEREATING: NOT AT ALL
10. IF YOU CHECKED OFF ANY PROBLEMS, HOW DIFFICULT HAVE THESE PROBLEMS MADE IT FOR YOU TO DO YOUR WORK, TAKE CARE OF THINGS AT HOME, OR GET ALONG WITH OTHER PEOPLE: NOT DIFFICULT AT ALL
4. FEELING TIRED OR HAVING LITTLE ENERGY: NOT AT ALL
3. TROUBLE FALLING OR STAYING ASLEEP: SEVERAL DAYS

## 2025-03-20 NOTE — PROGRESS NOTES
Medicare Annual Wellness Visit    Arturo Green is here for Medicare AWV    Assessment & Plan   Medicare annual wellness visit, subsequent  Neuropathy  Est. Uncontrolled. Chemotherapy related. Increase lyrica to either 50mg twice daily or 100mg qam. Pt agrees    Dilated cardiomyopathy (HCC)  Est. Stable. S/p PCI. Continue follow up with cardiology    Paroxysmal atrial fibrillation (HCC)  Est. Stable. Continue follow up with cardiology    Suicide attempt (HCC)  Resolved. Continue SSRI lifelong    Hodgkin's disease, nodular sclerosis, of lymph nodes of head, face, and neck (HCC)  In remission.    Chronic systolic (congestive) heart failure  Est. Stable. Continue follow up with HF clinic     Return in 6 months (on 9/20/2025).     Subjective   The following acute and/or chronic problems were also addressed today:  Doing well; Hx of CAD s/p PCI with Dr. Astorga last sep. Just finished cardiac rehab.  C-scope next week -needed to be rescheduled s/p 6mos DAPT  Hx of neuropathy s/p NHL chemo- 'feet are still killing me.' On lyrica 50mg qam.  Also has low back pain that travels down to the left calf. That actually bothers him more.  No pain in calf with walking. Goes away with crossing leg and holding his ankle. He'll think about physical therapy. Used to lift and be super fit. Has a lot going on right now.    He doesn't have much sex drive. Wife is still concerned about sex drive. Sees advertisements for [employer] or HIMs. Wonders about those. Wellbutrin sounds familiar but he isn't sure why. Hx of suicide attempt - so zoloft is necessary. BB can also cause sexual dysfunction    Patient's complete Health Risk Assessment and screening values have been reviewed and are found in Flowsheets. The following problems were reviewed today and where indicated follow up appointments were made and/or referrals ordered.    Positive Risk Factor Screenings with Interventions:       Alcohol Screening:  AUDIT-C Score: (Patient-Rptd)

## 2025-04-08 RX ORDER — ONDANSETRON 4 MG/1
4 TABLET, ORALLY DISINTEGRATING ORAL
COMMUNITY
Start: 2025-02-03

## 2025-04-08 RX ORDER — POLYETHYLENE GLYCOL 3350, SODIUM SULFATE, SODIUM CHLORIDE, POTASSIUM CHLORIDE, ASCORBIC ACID, SODIUM ASCORBATE 140-9-5.2G
KIT ORAL
COMMUNITY
Start: 2025-02-03

## 2025-04-09 ENCOUNTER — OFFICE VISIT (OUTPATIENT)
Dept: FAMILY MEDICINE CLINIC | Age: 65
End: 2025-04-09

## 2025-04-09 VITALS
TEMPERATURE: 97.1 F | BODY MASS INDEX: 28.49 KG/M2 | HEART RATE: 79 BPM | DIASTOLIC BLOOD PRESSURE: 64 MMHG | WEIGHT: 215 LBS | OXYGEN SATURATION: 95 % | SYSTOLIC BLOOD PRESSURE: 110 MMHG | HEIGHT: 73 IN

## 2025-04-09 DIAGNOSIS — J01.90 ACUTE BACTERIAL SINUSITIS: Primary | ICD-10-CM

## 2025-04-09 DIAGNOSIS — B96.89 ACUTE BACTERIAL SINUSITIS: Primary | ICD-10-CM

## 2025-04-09 RX ORDER — BROMPHENIRAMINE MALEATE, PSEUDOEPHEDRINE HYDROCHLORIDE, AND DEXTROMETHORPHAN HYDROBROMIDE 2; 30; 10 MG/5ML; MG/5ML; MG/5ML
SYRUP ORAL
COMMUNITY
Start: 2025-03-26

## 2025-04-09 RX ORDER — BENZONATATE 200 MG/1
200 CAPSULE ORAL
COMMUNITY
Start: 2025-04-01

## 2025-04-09 RX ORDER — PREDNISONE 20 MG/1
40 TABLET ORAL DAILY
COMMUNITY
Start: 2025-04-01

## 2025-04-09 RX ORDER — METHYLPREDNISOLONE 4 MG/1
4 TABLET ORAL
COMMUNITY
Start: 2025-03-26

## 2025-04-09 RX ORDER — DOXYCYCLINE 100 MG/1
100 CAPSULE ORAL
COMMUNITY
Start: 2025-03-26

## 2025-04-09 NOTE — PROGRESS NOTES
doxycycline hyclate (VIBRAMYCIN) 100 MG capsule 1 capsule (Patient not taking: Reported on 4/9/2025)      brompheniramine-pseudoephedrine-DM 2-30-10 MG/5ML syrup TAKE 10 ML BY MOUTH EVERY 4 HOURS AS NEEDED FOR COUGH FOR 5 DAYS (Patient not taking: Reported on 4/9/2025)       No current facility-administered medications on file prior to visit.       OBJECTIVE:  /64   Pulse 79   Temp 97.1 °F (36.2 °C) (Temporal)   Ht 1.854 m (6' 1\")   Wt 97.5 kg (215 lb)   SpO2 95%   BMI 28.37 kg/m²      Physical exam:  afebrile, vitals reviewed  Gen:  NAD, pleasant  HEENT: Eyes: no conjunctivitis, EOMI, PERRLA. Ears: TM clear b/l, light reflex wnl. No lesions in mouth or lips. Oropharynx slightly erythematous, no tonsilar hypertrophy or exudates  Neck:  Supple, No cervical or submandibular LAD. No obvious thyromegaly.  Heart:  RRR, no murmur, rubs, gallops  Lungs:  CTAB, no W/R/R  Abd:  soft, NT/ND  Skin: No obvious rashes    ASSESSMENT/PLAN:  1. Acute bacterial sinusitis  New. Occurs once annually around this time of year. Treat with po antibiotics. If persists, will need treatment for seasonal allergies. Pt agrees  -     amoxicillin-clavulanate (AUGMENTIN) 875-125 MG per tablet; Take 1 tablet by mouth 2 times daily for 7 days, Disp-14 tablet, R-0Normal      Electronically signed by Ameena Lindsey MD on 4/9/2025 at 2:08 PM.

## 2025-04-24 ENCOUNTER — OFFICE VISIT (OUTPATIENT)
Dept: FAMILY MEDICINE CLINIC | Age: 65
End: 2025-04-24

## 2025-04-24 VITALS
SYSTOLIC BLOOD PRESSURE: 120 MMHG | WEIGHT: 216 LBS | DIASTOLIC BLOOD PRESSURE: 70 MMHG | HEIGHT: 73 IN | BODY MASS INDEX: 28.63 KG/M2 | OXYGEN SATURATION: 97 % | HEART RATE: 78 BPM | TEMPERATURE: 97 F

## 2025-04-24 DIAGNOSIS — J32.9 CHRONIC RHINOSINUSITIS: Primary | ICD-10-CM

## 2025-04-24 RX ORDER — PREDNISONE 20 MG/1
TABLET ORAL
Qty: 15 TABLET | Refills: 0 | Status: SHIPPED | OUTPATIENT
Start: 2025-04-24 | End: 2025-05-04

## 2025-04-24 RX ORDER — DOXYCYCLINE HYCLATE 100 MG
100 TABLET ORAL 2 TIMES DAILY
Qty: 20 TABLET | Refills: 0 | Status: SHIPPED | OUTPATIENT
Start: 2025-04-24 | End: 2025-05-04

## 2025-04-24 NOTE — PROGRESS NOTES
(ERGOCALCIFEROL) 1.25 MG (41528 UT) CAPS capsule TAKE 1 CAPSULE BY MOUTH ONCE A WEEK AT  5PM 4 capsule 3    pantoprazole (PROTONIX) 40 MG tablet TAKE 1 TABLET BY MOUTH IN THE  MORNING BEFORE BREAKFAST 100 tablet 2    ezetimibe (ZETIA) 10 MG tablet Take 1 tablet by mouth daily 90 tablet 3    clopidogrel (PLAVIX) 75 MG tablet Take 1 tablet by mouth daily 90 tablet 3    tadalafil (CIALIS) 5 MG tablet T2 tab po 1 hour prior to sexual intercourse. Max 10mg per 24 hours. 60 tablet 3    aspirin EC 81 MG EC tablet Take 1 tablet by mouth daily 90 tablet 3     No current facility-administered medications on file prior to visit.       OBJECTIVE:  /70   Pulse 78   Temp 97 °F (36.1 °C) (Temporal)   Ht 1.854 m (6' 1\")   Wt 98 kg (216 lb)   SpO2 97%   BMI 28.50 kg/m²      Physical exam:  afebrile, vitals reviewed  Gen:  NAD, non-toxic. pleasant  HEENT: Eyes: no conjunctivitis, EOMI, PERRLA. Ears: TM clear b/l, light reflex wnl. No lesions in mouth or lips. Oropharynx slightly erythematous, no tonsilar hypertrophy or exudates  Neck:  Supple, No cervical or submandibular LAD. No obvious thyromegaly.  Heart:  RRR, no murmur, rubs, gallops  Lungs:  CTAB, no W/R/R  Abd:  soft, NT/ND  Skin: No obvious rashes    ASSESSMENT/PLAN:  1. Chronic rhinosinusitis  Acute on chronic, uncontrolled. Refractory to augmentin. Start doxy + steroid burst for 10 days. If continues, will need ENT evaluation  -     doxycycline hyclate (VIBRA-TABS) 100 MG tablet; Take 1 tablet by mouth 2 times daily for 10 days, Disp-20 tablet, R-0Normal  -     predniSONE (DELTASONE) 20 MG tablet; Take 2 tablets by mouth daily for 5 days, THEN 1 tablet daily for 5 days., Disp-15 tablet, R-0Normal      Electronically signed by Ameena Lindsey MD on 4/24/2025 at 3:26 PM.

## 2025-06-02 DIAGNOSIS — G62.9 NEUROPATHY: ICD-10-CM

## 2025-06-03 RX ORDER — PREGABALIN 50 MG/1
CAPSULE ORAL
Qty: 90 CAPSULE | Refills: 0 | Status: SHIPPED | OUTPATIENT
Start: 2025-06-03 | End: 2025-09-01

## 2025-06-03 NOTE — TELEPHONE ENCOUNTER
Medication:   Requested Prescriptions     Pending Prescriptions Disp Refills    pregabalin (LYRICA) 50 MG capsule [Pharmacy Med Name: Pregabalin 50 MG Oral Capsule] 90 capsule 0     Sig: TAKE 1 CAPSULE BY MOUTH IN THE MORNING BEFORE BREAKFAST        Last Filled:  02/21/2025 #90 0rf     Patient Phone Number: [phone] (home)     Last appt: 4/24/2025   Next appt: Visit date not found    Last OARRS:       9/29/2020    12:23 PM   RX Monitoring   Periodic Controlled Substance Monitoring Possible medication side effects, risk of tolerance/dependence & alternative treatments discussed.;No signs of potential drug abuse or diversion identified.

## 2025-06-26 DIAGNOSIS — E55.9 VITAMIN D DEFICIENCY: ICD-10-CM

## 2025-06-26 RX ORDER — ERGOCALCIFEROL 1.25 MG/1
CAPSULE, LIQUID FILLED ORAL
Qty: 4 CAPSULE | Refills: 8 | Status: SHIPPED | OUTPATIENT
Start: 2025-06-26

## 2025-06-26 NOTE — TELEPHONE ENCOUNTER
Medication:   Requested Prescriptions     Pending Prescriptions Disp Refills    vitamin D (ERGOCALCIFEROL) 1.25 MG (51355 UT) CAPS capsule [Pharmacy Med Name: Vitamin D (Ergocalciferol) 1.25 MG (16755 UT) Oral Capsule] 4 capsule 0     Sig: TAKE 1 CAPSULE BY MOUTH ONCE A WEEK AT  5 PM        Last Filled:  1/13/2025 4 caps 3 refills     Patient Phone Number: [phone] (home)     Last appt: 4/24/2025   Next appt: Visit date not found    Last OARRS:       9/29/2020    12:23 PM   RX Monitoring   Periodic Controlled Substance Monitoring Possible medication side effects, risk of tolerance/dependence & alternative treatments discussed.;No signs of potential drug abuse or diversion identified.

## 2025-07-22 RX ORDER — CLOPIDOGREL BISULFATE 75 MG/1
75 TABLET ORAL DAILY
Qty: 100 TABLET | Refills: 2 | Status: SHIPPED | OUTPATIENT
Start: 2025-07-22

## 2025-07-22 NOTE — TELEPHONE ENCOUNTER
Received refill request for   clopidogrel (PLAVIX) 75 MG  from   [facility]    pharmacy.     Last OV: 10/21/2024 CBM    Next OV: 10/27/2025  CBM    Last Labs: 9/19/2024 CBC

## 2025-08-06 RX ORDER — VALSARTAN 80 MG/1
80 TABLET ORAL DAILY
Qty: 90 TABLET | Refills: 3 | Status: SHIPPED | OUTPATIENT
Start: 2025-08-06

## 2025-08-13 ENCOUNTER — HOSPITAL ENCOUNTER (OUTPATIENT)
Age: 65
Discharge: HOME | End: 2025-08-15
Payer: MEDICARE

## 2025-08-13 VITALS
DIASTOLIC BLOOD PRESSURE: 72 MMHG | SYSTOLIC BLOOD PRESSURE: 147 MMHG | HEIGHT: 73 IN | BODY MASS INDEX: 28.63 KG/M2 | WEIGHT: 216 LBS

## 2025-08-13 DIAGNOSIS — I50.22 CHRONIC SYSTOLIC CONGESTIVE HEART FAILURE (HCC): ICD-10-CM

## 2025-08-13 LAB
ECHO AO ROOT DIAM: 3.5 CM
ECHO AO ROOT INDEX: 1.58 CM/M2
ECHO AV AREA PEAK VELOCITY: 2.7 CM2
ECHO AV AREA VTI: 2.4 CM2
ECHO AV AREA/BSA PEAK VELOCITY: 1.2 CM2/M2
ECHO AV AREA/BSA VTI: 1.1 CM2/M2
ECHO AV MEAN GRADIENT: 6 MMHG
ECHO AV MEAN VELOCITY: 1.2 M/S
ECHO AV PEAK GRADIENT: 9 MMHG
ECHO AV PEAK VELOCITY: 1.5 M/S
ECHO AV VELOCITY RATIO: 0.87
ECHO AV VTI: 36.6 CM
ECHO BSA: 2.25 M2
ECHO LA AREA 2C: 20.5 CM2
ECHO LA AREA 4C: 16.2 CM2
ECHO LA MAJOR AXIS: 4.7 CM
ECHO LA MINOR AXIS: 5.2 CM
ECHO LA VOL BP: 55 ML (ref 18–58)
ECHO LA VOL MOD A2C: 68 ML (ref 18–58)
ECHO LA VOL MOD A4C: 41 ML (ref 18–58)
ECHO LA VOL/BSA BIPLANE: 25 ML/M2 (ref 16–34)
ECHO LA VOLUME INDEX MOD A2C: 31 ML/M2 (ref 16–34)
ECHO LA VOLUME INDEX MOD A4C: 18 ML/M2 (ref 16–34)
ECHO LV E' LATERAL VELOCITY: 9.57 CM/S
ECHO LV E' SEPTAL VELOCITY: 7.72 CM/S
ECHO LV EDV A2C: 60 ML
ECHO LV EDV A4C: 121 ML
ECHO LV EDV INDEX A4C: 55 ML/M2
ECHO LV EDV NDEX A2C: 27 ML/M2
ECHO LV EF PHYSICIAN: 48 %
ECHO LV EJECTION FRACTION A2C: 39 %
ECHO LV EJECTION FRACTION A4C: 54 %
ECHO LV EJECTION FRACTION BIPLANE: 48 % (ref 55–100)
ECHO LV ESV A2C: 36 ML
ECHO LV ESV A4C: 55 ML
ECHO LV ESV INDEX A2C: 16 ML/M2
ECHO LV ESV INDEX A4C: 25 ML/M2
ECHO LV FRACTIONAL SHORTENING: 28 % (ref 28–44)
ECHO LV INTERNAL DIMENSION DIASTOLE INDEX: 2.07 CM/M2
ECHO LV INTERNAL DIMENSION DIASTOLIC: 4.6 CM (ref 4.2–5.9)
ECHO LV INTERNAL DIMENSION SYSTOLIC INDEX: 1.49 CM/M2
ECHO LV INTERNAL DIMENSION SYSTOLIC: 3.3 CM
ECHO LV IVSD: 0.8 CM (ref 0.6–1)
ECHO LV MASS 2D: 127.7 G (ref 88–224)
ECHO LV MASS INDEX 2D: 57.5 G/M2 (ref 49–115)
ECHO LV POSTERIOR WALL DIASTOLIC: 0.9 CM (ref 0.6–1)
ECHO LV RELATIVE WALL THICKNESS RATIO: 0.39
ECHO LVOT AREA: 3.1 CM2
ECHO LVOT AV VTI INDEX: 0.75
ECHO LVOT DIAM: 2 CM
ECHO LVOT MEAN GRADIENT: 3 MMHG
ECHO LVOT PEAK GRADIENT: 6 MMHG
ECHO LVOT PEAK VELOCITY: 1.3 M/S
ECHO LVOT STROKE VOLUME INDEX: 39 ML/M2
ECHO LVOT SV: 86.7 ML
ECHO LVOT VTI: 27.6 CM
ECHO MV AREA VTI: 2.6 CM2
ECHO MV LVOT VTI INDEX: 1.21
ECHO MV MAX VELOCITY: 1 M/S
ECHO MV MEAN GRADIENT: 2 MMHG
ECHO MV MEAN VELOCITY: 0.6 M/S
ECHO MV PEAK GRADIENT: 4 MMHG
ECHO MV VTI: 33.3 CM
ECHO PV MAX VELOCITY: 1.2 M/S
ECHO PV PEAK GRADIENT: 6 MMHG
ECHO RA AREA 4C: 17.4 CM2
ECHO RA END SYSTOLIC VOLUME APICAL 4 CHAMBER INDEX BSA: 24 ML/M2
ECHO RA VOLUME: 54 ML
ECHO RV BASAL DIMENSION: 4.3 CM
ECHO RV FREE WALL PEAK S': 10.2 CM/S
ECHO RV LONGITUDINAL DIMENSION: 7.7 CM
ECHO RV MID DIMENSION: 2.9 CM
ECHO RV TAPSE: 2.1 CM (ref 1.7–?)

## 2025-08-13 PROCEDURE — 93306 TTE W/DOPPLER COMPLETE: CPT | Performed by: INTERNAL MEDICINE

## 2025-08-13 PROCEDURE — 93306 TTE W/DOPPLER COMPLETE: CPT

## 2025-08-14 ENCOUNTER — RESULTS FOLLOW-UP (OUTPATIENT)
Dept: CARDIOLOGY CLINIC | Age: 65
End: 2025-08-14

## 2025-08-14 ENCOUNTER — OFFICE VISIT (OUTPATIENT)
Dept: CARDIOLOGY CLINIC | Age: 65
End: 2025-08-14
Payer: MEDICARE

## 2025-08-14 VITALS
SYSTOLIC BLOOD PRESSURE: 110 MMHG | HEART RATE: 68 BPM | DIASTOLIC BLOOD PRESSURE: 70 MMHG | WEIGHT: 208 LBS | BODY MASS INDEX: 27.57 KG/M2 | HEIGHT: 73 IN | OXYGEN SATURATION: 98 %

## 2025-08-14 DIAGNOSIS — N52.8 OTHER MALE ERECTILE DYSFUNCTION: ICD-10-CM

## 2025-08-14 DIAGNOSIS — I25.10 ATHEROSCLEROSIS OF NATIVE CORONARY ARTERY OF NATIVE HEART WITHOUT ANGINA PECTORIS: Primary | ICD-10-CM

## 2025-08-14 DIAGNOSIS — I50.22 CHRONIC SYSTOLIC CONGESTIVE HEART FAILURE, NYHA CLASS 1 (HCC): ICD-10-CM

## 2025-08-14 PROCEDURE — 99214 OFFICE O/P EST MOD 30 MIN: CPT | Performed by: NURSE PRACTITIONER

## 2025-08-14 RX ORDER — TADALAFIL 10 MG/1
TABLET ORAL
Qty: 90 TABLET | Refills: 1 | Status: SHIPPED | OUTPATIENT
Start: 2025-08-14

## 2025-08-18 DIAGNOSIS — I50.22 CHRONIC SYSTOLIC CONGESTIVE HEART FAILURE, NYHA CLASS 1 (HCC): ICD-10-CM

## 2025-08-18 DIAGNOSIS — I25.10 ATHEROSCLEROSIS OF NATIVE CORONARY ARTERY OF NATIVE HEART WITHOUT ANGINA PECTORIS: ICD-10-CM

## 2025-08-18 LAB
ALBUMIN SERPL-MCNC: 4.8 G/DL (ref 3.4–5)
ALBUMIN/GLOB SERPL: 2.5 {RATIO} (ref 1.1–2.2)
ALP SERPL-CCNC: 113 U/L (ref 40–129)
ALT SERPL-CCNC: 29 U/L (ref 10–40)
ANION GAP SERPL CALCULATED.3IONS-SCNC: 12 MMOL/L (ref 3–16)
AST SERPL-CCNC: 26 U/L (ref 15–37)
BILIRUB SERPL-MCNC: 0.7 MG/DL (ref 0–1)
BUN SERPL-MCNC: 10 MG/DL (ref 7–20)
CALCIUM SERPL-MCNC: 10 MG/DL (ref 8.3–10.6)
CHLORIDE SERPL-SCNC: 102 MMOL/L (ref 99–110)
CHOLEST SERPL-MCNC: 198 MG/DL (ref 0–199)
CO2 SERPL-SCNC: 27 MMOL/L (ref 21–32)
CREAT SERPL-MCNC: 0.8 MG/DL (ref 0.8–1.3)
GFR SERPLBLD CREATININE-BSD FMLA CKD-EPI: >90 ML/MIN/{1.73_M2}
GLUCOSE SERPL-MCNC: 88 MG/DL (ref 70–99)
HDLC SERPL-MCNC: 58 MG/DL (ref 40–60)
LDLC SERPL CALC-MCNC: 105 MG/DL
NT-PROBNP SERPL-MCNC: 198 PG/ML (ref 0–124)
POTASSIUM SERPL-SCNC: 5 MMOL/L (ref 3.5–5.1)
PROT SERPL-MCNC: 6.7 G/DL (ref 6.4–8.2)
SODIUM SERPL-SCNC: 141 MMOL/L (ref 136–145)
TRIGL SERPL-MCNC: 176 MG/DL (ref 0–150)
VLDLC SERPL CALC-MCNC: 35 MG/DL

## 2025-08-19 ENCOUNTER — RESULTS FOLLOW-UP (OUTPATIENT)
Dept: CARDIOLOGY CLINIC | Age: 65
End: 2025-08-19

## (undated) DEVICE — SYSTEM SKIN CLSR 22CM DERMBND PRINEO

## (undated) DEVICE — GLOVE SURG SZ 65 THK91MIL LTX FREE SYN POLYISOPRENE

## (undated) DEVICE — CATH LAB PACK: Brand: MEDLINE INDUSTRIES, INC.

## (undated) DEVICE — PAD, DEFIB, ADULT, RADIOTRANS, PHYSIO: Brand: MEDLINE

## (undated) DEVICE — PENCIL SMK EVAC ALL IN 1 DSGN ENH VISIBILITY IMPROVED AIR

## (undated) DEVICE — DRAPE,ANGIO,BRACH,STERILE,38X44: Brand: MEDLINE

## (undated) DEVICE — CORONARY IMAGING CATHETER: Brand: OPTICROSS™ 6 HD

## (undated) DEVICE — YANKAUER,BULB TIP,W/O VENT,RIGID,STERILE: Brand: MEDLINE

## (undated) DEVICE — T4 HOOD

## (undated) DEVICE — T-MAX DISPOSABLE FACE MASK 8 PER BOX

## (undated) DEVICE — CATH BLLN ANGIO 3.50X15MM NC EUPHORA RX

## (undated) DEVICE — SMARTGOWN SURGICAL GOWN, XL: Brand: CONVERTORS

## (undated) DEVICE — SOLUTION IV IRRIG WATER 1000ML POUR BRL 2F7114

## (undated) DEVICE — 1016 S-DRAPE IRRIG POUCH 10/BOX: Brand: STERI-DRAPE™

## (undated) DEVICE — GLOVE ORANGE PI 7 1/2   MSG9075

## (undated) DEVICE — GLIDESHEATH SLENDER STAINLESS STEEL KIT: Brand: GLIDESHEATH SLENDER

## (undated) DEVICE — SYRINGE MED 10ML SLIP TIP BLNT FILL AND LUERLOCK DISP

## (undated) DEVICE — GUIDEPIN ORTH 3X75 MM SS SIMPLICITI

## (undated) DEVICE — Device

## (undated) DEVICE — Z DUP USE 2715602 GUIDEWIRE SURG L220MM DIA25MM SHLDR FOR GLEN KEELED AEQUALIS
Type: IMPLANTABLE DEVICE | Site: SHOULDER | Status: NON-FUNCTIONAL
Removed: 2019-10-03

## (undated) DEVICE — 35 ML SYRINGE LUER-LOCK TIP: Brand: MONOJECT

## (undated) DEVICE — 3 BONE CEMENT MIXER: Brand: MIXEVAC

## (undated) DEVICE — KIT AT-X65 ANGIOTOUCH HAND CONTROLLER

## (undated) DEVICE — COVER,TABLE,HEAVY DUTY,50"X90",STRL: Brand: MEDLINE

## (undated) DEVICE — TR BAND RADIAL ARTERY COMPRESSION DEVICE: Brand: TR BAND

## (undated) DEVICE — RADIFOCUS OPTITORQUE ANGIOGRAPHIC CATHETER: Brand: OPTITORQUE

## (undated) DEVICE — CATHETER GUID AD 6FR L100CM COR PERIPH GRN NYL PTFE XB L 3

## (undated) DEVICE — Device: Brand: NOMOLINE™ LH ADULT NASAL CO2 CANNULA WITH O2 4M

## (undated) DEVICE — SURGICAL PROCEDURE PACK IV U-BAR

## (undated) DEVICE — GUIDEWIRE VASC L260CM DIA0.035IN RAD 3MM J TIP L7CM PTFE

## (undated) DEVICE — GLOVE ORANGE PI 8   MSG9080

## (undated) DEVICE — BIT DRL OD2.0MM MRK

## (undated) DEVICE — 3M™ STERI-DRAPE™ INSTRUMENT POUCH 1018: Brand: STERI-DRAPE™

## (undated) DEVICE — DRESSING FOAM SELF ADH 20X10 CM ABSORBENT MEPILEX BORDER

## (undated) DEVICE — SUTURE FIBERWIRE SZ 5 L38IN NONABSORBABLE BLU L48MM 1/2 AR7211

## (undated) DEVICE — PRESSURE GUIDEWIRE: Brand: COMET™ II

## (undated) DEVICE — NEEDLE SPNL 20GA L3.5IN YEL HUB S STL REG WALL FIT STYL W/

## (undated) DEVICE — CATHETER DIAG L100CM DIA5.2FR 0.038IN POLYUR COR JR4 STD

## (undated) DEVICE — STOCKINETTE,IMPERVIOUS,12X48,STERILE: Brand: MEDLINE